# Patient Record
Sex: FEMALE | Race: WHITE | NOT HISPANIC OR LATINO | Employment: PART TIME | URBAN - NONMETROPOLITAN AREA
[De-identification: names, ages, dates, MRNs, and addresses within clinical notes are randomized per-mention and may not be internally consistent; named-entity substitution may affect disease eponyms.]

---

## 2017-01-16 ENCOUNTER — OFFICE VISIT (OUTPATIENT)
Dept: RETAIL CLINIC | Facility: CLINIC | Age: 42
End: 2017-01-16

## 2017-01-16 VITALS
TEMPERATURE: 98.9 F | DIASTOLIC BLOOD PRESSURE: 74 MMHG | BODY MASS INDEX: 35.74 KG/M2 | HEART RATE: 84 BPM | OXYGEN SATURATION: 97 % | RESPIRATION RATE: 18 BRPM | HEIGHT: 66 IN | SYSTOLIC BLOOD PRESSURE: 110 MMHG | WEIGHT: 222.4 LBS

## 2017-01-16 DIAGNOSIS — J01.41 ACUTE RECURRENT PANSINUSITIS: Primary | ICD-10-CM

## 2017-01-16 DIAGNOSIS — H66.002 ACUTE SUPPURATIVE OTITIS MEDIA OF LEFT EAR WITHOUT SPONTANEOUS RUPTURE OF TYMPANIC MEMBRANE, RECURRENCE NOT SPECIFIED: ICD-10-CM

## 2017-01-16 PROCEDURE — 99213 OFFICE O/P EST LOW 20 MIN: CPT | Performed by: NURSE PRACTITIONER

## 2017-01-16 PROCEDURE — 96372 THER/PROPH/DIAG INJ SC/IM: CPT | Performed by: NURSE PRACTITIONER

## 2017-01-16 RX ORDER — DEXAMETHASONE SODIUM PHOSPHATE 4 MG/ML
4 INJECTION, SOLUTION INTRA-ARTICULAR; INTRALESIONAL; INTRAMUSCULAR; INTRAVENOUS; SOFT TISSUE ONCE
Status: COMPLETED | OUTPATIENT
Start: 2017-01-16 | End: 2017-01-16

## 2017-01-16 RX ORDER — CEFDINIR 300 MG/1
300 CAPSULE ORAL 2 TIMES DAILY
Qty: 20 CAPSULE | Refills: 0 | Status: SHIPPED | OUTPATIENT
Start: 2017-01-16 | End: 2017-01-26

## 2017-01-16 RX ADMIN — DEXAMETHASONE SODIUM PHOSPHATE 4 MG: 4 INJECTION, SOLUTION INTRA-ARTICULAR; INTRALESIONAL; INTRAMUSCULAR; INTRAVENOUS; SOFT TISSUE at 16:25

## 2017-01-16 NOTE — MR AVS SNAPSHOT
Aliza Garcia   1/16/2017 3:00 PM   Office Visit    Dept Phone:  [phone]   Encounter #:  [id number]    Provider:  MELISSA VERA   Department:  [facility]                Your Full Care Plan              Today's Medication Changes          These changes are accurate as of: 1/16/17  4:43 PM.  If you have any questions, ask your nurse or doctor.               New Medication(s)Ordered:     cefdinir 300 MG capsule   Commonly known as:  OMNICEF   Take 1 capsule by mouth 2 (Two) Times a Day for 10 days.            Where to Get Your Medications      These medications were sent to [facility] [address] [place] [address] [address] [place] [address] - [phone]  - [phone] [address] [address]     Phone:  [phone]     cefdinir 300 MG capsule                  Your Updated Medication List          This list is accurate as of: 1/16/17  4:43 PM.  Always use your most recent med list.                ACIPHEX PO       cefdinir 300 MG capsule   Commonly known as:  OMNICEF   Take 1 capsule by mouth 2 (Two) Times a Day for 10 days.       ORTHO-NOVUM 1/35 (28) 1-35 MG-MCG per tablet   Generic drug:  norethindrone-ethinyl estradiol       SYNTHROID PO               You Were Diagnosed With        Codes Comments    Acute recurrent pansinusitis    -  Primary ICD-10-CM: J01.41  ICD-9-CM: 461.8     Acute suppurative otitis media of left ear without spontaneous rupture of tympanic membrane, recurrence not specified     ICD-10-CM: H66.002  ICD-9-CM: 382.00       Medications to be Given to You by a Medical Professional     Due       Frequency    (none) dexamethasone (DECADRON) injection 4 mg  Once      Instructions    Sinusitis, Adult  Sinusitis is redness, soreness, and inflammation of the paranasal sinuses. Paranasal sinuses are air pockets within the bones of your face. They are located beneath your eyes, in the middle of your forehead, and above your eyes. In  healthy paranasal sinuses, mucus is able to drain out, and air is able to circulate through them by way of your nose. However, when your paranasal sinuses are inflamed, mucus and air can become trapped. This can allow bacteria and other germs to grow and cause infection.  Sinusitis can develop quickly and last only a short time (acute) or continue over a long period (chronic). Sinusitis that lasts for more than 12 weeks is considered chronic.  CAUSES  Causes of sinusitis include:  · Allergies.  · Structural abnormalities, such as displacement of the cartilage that separates your nostrils (deviated septum), which can decrease the air flow through your nose and sinuses and affect sinus drainage.  · Functional abnormalities, such as when the small hairs (cilia) that line your sinuses and help remove mucus do not work properly or are not present.  SIGNS AND SYMPTOMS  Symptoms of acute and chronic sinusitis are the same. The primary symptoms are pain and pressure around the affected sinuses. Other symptoms include:  · Upper toothache.  · Earache.  · Headache.  · Bad breath.  · Decreased sense of smell and taste.  · A cough, which worsens when you are lying flat.  · Fatigue.  · Fever.  · Thick drainage from your nose, which often is green and may contain pus (purulent).  · Swelling and warmth over the affected sinuses.  DIAGNOSIS  Your health care provider will perform a physical exam. During your exam, your health care provider may perform any of the following to help determine if you have acute sinusitis or chronic sinusitis:  · Look in your nose for signs of abnormal growths in your nostrils (nasal polyps).  · Tap over the affected sinus to check for signs of infection.  · View the inside of your sinuses using an imaging device that has a light attached (endoscope).  If your health care provider suspects that you have chronic sinusitis, one or more of the following tests may be recommended:  · Allergy tests.  · Nasal  culture. A sample of mucus is taken from your nose, sent to a lab, and screened for bacteria.  · Nasal cytology. A sample of mucus is taken from your nose and examined by your health care provider to determine if your sinusitis is related to an allergy.  TREATMENT  Most cases of acute sinusitis are related to a viral infection and will resolve on their own within 10 days. Sometimes, medicines are prescribed to help relieve symptoms of both acute and chronic sinusitis. These may include pain medicines, decongestants, nasal steroid sprays, or saline sprays.  However, for sinusitis related to a bacterial infection, your health care provider will prescribe antibiotic medicines. These are medicines that will help kill the bacteria causing the infection.  Rarely, sinusitis is caused by a fungal infection. In these cases, your health care provider will prescribe antifungal medicine.  For some cases of chronic sinusitis, surgery is needed. Generally, these are cases in which sinusitis recurs more than 3 times per year, despite other treatments.  HOME CARE INSTRUCTIONS  · Drink plenty of water. Water helps thin the mucus so your sinuses can drain more easily.  · Use a humidifier.  · Inhale steam 3-4 times a day (for example, sit in the bathroom with the shower running).  · Apply a warm, moist washcloth to your face 3-4 times a day, or as directed by your health care provider.  · Use saline nasal sprays to help moisten and clean your sinuses.  · Take medicines only as directed by your health care provider.  · If you were prescribed either an antibiotic or antifungal medicine, finish it all even if you start to feel better.  SEEK IMMEDIATE MEDICAL CARE IF:  · You have increasing pain or severe headaches.  · You have nausea, vomiting, or drowsiness.  · You have swelling around your face.  · You have vision problems.  · You have a stiff neck.  · You have difficulty breathing.     This information is not intended to replace  advice given to you by your health care provider. Make sure you discuss any questions you have with your health care provider.     Document Released: 12/18/2006 Document Revised: 01/08/2016 Document Reviewed: 01/01/2013  [employer] Interactive Patient Education ©2016 [employer]      Otitis Media, Adult  Otitis media is redness, soreness, and inflammation of the middle ear. Otitis media may be caused by allergies or, most commonly, by infection. Often it occurs as a complication of the common cold.  SIGNS AND SYMPTOMS  Symptoms of otitis media may include:  · Earache.  · Fever.  · Ringing in your ear.  · Headache.  · Leakage of fluid from the ear.  DIAGNOSIS  To diagnose otitis media, your health care provider will examine your ear with an otoscope. This is an instrument that allows your health care provider to see into your ear in order to examine your eardrum. Your health care provider also will ask you questions about your symptoms.  TREATMENT   Typically, otitis media resolves on its own within 3-5 days. Your health care provider may prescribe medicine to ease your symptoms of pain. If otitis media does not resolve within 5 days or is recurrent, your health care provider may prescribe antibiotic medicines if he or she suspects that a bacterial infection is the cause.  HOME CARE INSTRUCTIONS   · If you were prescribed an antibiotic medicine, finish it all even if you start to feel better.  · Take medicines only as directed by your health care provider.  · Keep all follow-up visits as directed by your health care provider.  SEEK MEDICAL CARE IF:  · You have otitis media only in one ear, or bleeding from your nose, or both.  · You notice a lump on your neck.  · You are not getting better in 3-5 days.  · You feel worse instead of better.  SEEK IMMEDIATE MEDICAL CARE IF:   · You have pain that is not controlled with medicine.  · You have swelling, redness, or pain around your ear or stiffness in your neck.  · You  notice that part of your face is paralyzed.  · You notice that the bone behind your ear (mastoid) is tender when you touch it.  MAKE SURE YOU:   · Understand these instructions.  · Will watch your condition.  · Will get help right away if you are not doing well or get worse.     This information is not intended to replace advice given to you by your health care provider. Make sure you discuss any questions you have with your health care provider.       Dexamethasone injection  What is this medicine?  DEXAMETHASONE (dex a METH a sone) is a corticosteroid. It is used to treat inflammation of the skin, joints, lungs, and other organs. Common conditions treated include asthma, allergies, and arthritis. It is also used for other conditions, like blood disorders and diseases of the adrenal glands.  This medicine may be used for other purposes; ask your health care provider or pharmacist if you have questions.  What should I tell my health care provider before I take this medicine?  They need to know if you have any of these conditions:  -blood clotting problems  -Cushing's syndrome  -diabetes  -glaucoma  -heart problems or disease  -high blood pressure  -infection like herpes, measles, tuberculosis, or chickenpox  -kidney disease  -liver disease  -mental problems  -myasthenia gravis  -osteoporosis  -previous heart attack  -seizures  -stomach, ulcer or intestine disease including colitis and diverticulitis  -thyroid problem  -an unusual or allergic reaction to dexamethasone, corticosteroids, other medicines, lactose, foods, dyes, or preservatives  -pregnant or trying to get pregnant  -breast-feeding  How should I use this medicine?  This medicine is for injection into a muscle, joint, lesion, soft tissue, or vein. It is given by a health care professional in a hospital or clinic setting.  Talk to your pediatrician regarding the use of this medicine in children. Special care may be needed.  Overdosage: If you think you have  taken too much of this medicine contact a poison control center or emergency room at once.  NOTE: This medicine is only for you. Do not share this medicine with others.  What if I miss a dose?  This may not apply. If you are having a series of injections over a prolonged period, try not to miss an appointment. Call your doctor or health care professional to reschedule if you are unable to keep an appointment.  What may interact with this medicine?  Do not take this medicine with any of the following medications:  -mifepristone, RU-486  -vaccines  This medicine may also interact with the following medications:  -amphotericin B  -antibiotics like clarithromycin, erythromycin, and troleandomycin  -aspirin and aspirin-like drugs  -barbiturates like phenobarbital  -carbamazepine  -cholestyramine  -cholinesterase inhibitors like donepezil, galantamine, rivastigmine, and tacrine  -cyclosporine  -digoxin  -diuretics  -ephedrine  -female hormones, like estrogens or progestins and birth control pills  -indinavir  -isoniazid  -ketoconazole  -medicines for diabetes  -medicines that improve muscle tone or strength for conditions like myasthenia gravis  -NSAIDs, medicines for pain and inflammation, like ibuprofen or naproxen  -phenytoin  -rifampin  -thalidomide  -warfarin  This list may not describe all possible interactions. Give your health care provider a list of all the medicines, herbs, non-prescription drugs, or dietary supplements you use. Also tell them if you smoke, drink alcohol, or use illegal drugs. Some items may interact with your medicine.  What should I watch for while using this medicine?  Your condition will be monitored carefully while you are receiving this medicine.  If you are taking this medicine for a long time, carry an identification card with your name and address, the type and dose of your medicine, and your doctor's name and address.  This medicine may increase your risk of getting an infection. Stay  away from people who are sick. Tell your doctor or health care professional if you are around anyone with measles or chickenpox. Talk to your health care provider before you get any vaccines that you take this medicine.  If you are going to have surgery, tell your doctor or health care professional that you have taken this medicine within the last twelve months.  Ask your doctor or health care professional about your diet. You may need to lower the amount of salt you eat.  The medicine can increase your blood sugar. If you are a diabetic check with your doctor if you need help adjusting the dose of your diabetic medicine.  What side effects may I notice from receiving this medicine?  Side effects that you should report to your doctor or health care professional as soon as possible:  -allergic reactions like skin rash, itching or hives, swelling of the face, lips, or tongue  -black or tarry stools  -change in the amount of urine  -changes in vision  -confusion, excitement, restlessness, a false sense of well-being  -fever, sore throat, sneezing, cough, or other signs of infection, wounds that will not heal  -hallucinations  -increased thirst  -mental depression, mood swings, mistaken feelings of self importance or of being mistreated  -pain in hips, back, ribs, arms, shoulders, or legs  -pain, redness, or irritation at the injection site  -redness, blistering, peeling or loosening of the skin, including inside the mouth  -rounding out of face  -swelling of feet or lower legs  -unusual bleeding or bruising  -unusual tired or weak  -wounds that do not heal  Side effects that usually do not require medical attention (report to your doctor or health care professional if they continue or are bothersome):  -diarrhea or constipation  -change in taste  -headache  -nausea, vomiting  -skin problems, acne, thin and shiny skin  -touble sleeping  -unusual growth of hair on the face or body  -weight gain  This list may not  describe all possible side effects. Call your doctor for medical advice about side effects. You may report side effects to FDA at 7-189-FDA-8979.  Where should I keep my medicine?  This drug is given in a hospital or clinic and will not be stored at home.  NOTE: This sheet is a summary. It may not cover all possible information. If you have questions about this medicine, talk to your doctor, pharmacist, or health care provider.     © 2016, [employer]. (2009-04-09 14:04:12)      Document Released: 09/22/2005 Document Revised: 01/08/2016 Document Reviewed: 07/15/2014  [employer] Interactive Patient Education ©2016 [employer]    Cefdinir capsules  What is this medicine?  CEFDINIR (SEF di ner) is a cephalosporin antibiotic. It is used to treat certain kinds of bacterial infections. It will not work for colds, flu, or other viral infections.  This medicine may be used for other purposes; ask your health care provider or pharmacist if you have questions.  What should I tell my health care provider before I take this medicine?  They need to know if you have any of these conditions:  -bleeding problems  -kidney disease  -stomach or intestine problems (especially colitis)  -an unusual or allergic reaction to cefdinir, other cephalosporin antibiotics, penicillin, penicillamine, other foods, dyes or preservatives  -pregnant or trying to get pregnant  -breast-feeding  How should I use this medicine?  Take this medicine by mouth. Swallow it with a drink of water. Follow the directions on the prescription label. You can take it with or without food. If it upsets your stomach it may help to take it with food. Take your doses at regular intervals. Do not take it more often than directed. Finish all the medicine you are prescribed even if you think your infection is better.  Talk to your pediatrician regarding the use of this medicine in children. Special care may be needed.  Overdosage: If you think you have taken too  much of this medicine contact a poison control center or emergency room at once.  NOTE: This medicine is only for you. Do not share this medicine with others.  What if I miss a dose?  If you miss a dose, take it as soon as you can. If it is almost time for your next dose, take only that dose. Do not take double or extra doses.  What may interact with this medicine?  -antacids that contain aluminum or magnesium  -iron supplements  -other antibiotics  -probenecid  This list may not describe all possible interactions. Give your health care provider a list of all the medicines, herbs, non-prescription drugs, or dietary supplements you use. Also tell them if you smoke, drink alcohol, or use illegal drugs. Some items may interact with your medicine.  What should I watch for while using this medicine?  Tell your doctor or health care professional if your symptoms do not get better in a few days.  If you are diabetic you may get a false-positive result for sugar in your urine. Check with your doctor or health care professional before you change your diet or the dose of your diabetes medicine.  What side effects may I notice from receiving this medicine?  Side effects that you should report to your doctor or health care professional as soon as possible:  -allergic reactions like skin rash, itching or hives, swelling of the face, lips, or tongue  -breathing problems  -fever or chills  -redness, blistering, peeling or loosening of the skin, including inside the mouth  -seizures  -severe or watery diarrhea  -sore throat  -swollen joints  -trouble passing urine or change in the amount of urine  -unusual bleeding or bruising  -unusually weak or tired  Side effects that usually do not require medical attention (report to your doctor or health care professional if they continue or are bothersome):  -constipation  -dizziness  -gas or heartburn  -headache  -loss of appetite  -nausea or vomiting  -stomach pain  -stool  discoloration  -vaginal itching  This list may not describe all possible side effects. Call your doctor for medical advice about side effects. You may report side effects to FDA at 7-794-CLB-8861.  Where should I keep my medicine?  Keep out of the reach of children.  Store at room temperature between 15 and 30 degrees C (59 and 86 degrees F). Throw the medicine away after the expiration date.  NOTE: This sheet is a summary. It may not cover all possible information. If you have questions about this medicine, talk to your doctor, pharmacist, or health care provider.     © 2016, [employer]. (2009 16:02:53)      You have been given a steroid injection today. Risks and benefits were discussed prior to the injection.  Continue Flonase (1 spray twice a day for a few days, then daily).  Okay to try Afrin, but for no more than three days. Use a humidifier at the bedside.   You have evidence of a ear infection. Please take antibiotic as prescribed. Continue taking the Probiotic. If symptoms persist or worsen  please see Dr. Panda.          Patient Instructions History      Upcoming Appointments     Visit Type Date Time Department    OFFICE VISIT 2017  3:00 PM [facility]      [facility]     [employer] allows you to send messages to your doctor, view your test results, renew your prescriptions, schedule appointments, and more. To sign up, go to [employer] and click on the Sign Up Now link in the New User? box. Enter your [employer] Activation Code exactly as it appears below along with the last four digits of your Social Security Number and your Date of Birth () to complete the sign-up process. If you do not sign up before the expiration date, you must request a new code.    [employer] Activation Code: [email or web address]  Expires: 2017  4:43 PM    If you have questions, you can email [email or web address] or call [phone] to talk to our [employer]  "staff. Remember, MyChart is NOT to be used for urgent needs. For medical emergencies, dial 911.               Other Info from Your Visit           Allergies     No Known Allergies      Reason for Visit     Sinusitis X 2 days; was really bad yesterday; sinus pressure     Nasal Congestion used nose spray last night and that helped with the congestion a little bit      Vital Signs     Blood Pressure Pulse Temperature Respirations Height Weight    110/74 (BP Location: Right arm, Patient Position: Sitting, Cuff Size: Adult) 84 98.9 °F (37.2 °C) (Oral) 18 66\" (167.6 cm) 222 lb 6.4 oz (101 kg)    Last Menstrual Period Oxygen Saturation Body Mass Index Smoking Status          12/23/2016 (Approximate) 97% 35.9 kg/m2 Never Smoker        Problems and Diagnoses Noted     Sinus infection    -  Primary    Middle ear infection            "

## 2017-01-16 NOTE — PATIENT INSTRUCTIONS
Sinusitis, Adult  Sinusitis is redness, soreness, and inflammation of the paranasal sinuses. Paranasal sinuses are air pockets within the bones of your face. They are located beneath your eyes, in the middle of your forehead, and above your eyes. In healthy paranasal sinuses, mucus is able to drain out, and air is able to circulate through them by way of your nose. However, when your paranasal sinuses are inflamed, mucus and air can become trapped. This can allow bacteria and other germs to grow and cause infection.  Sinusitis can develop quickly and last only a short time (acute) or continue over a long period (chronic). Sinusitis that lasts for more than 12 weeks is considered chronic.  CAUSES  Causes of sinusitis include:  · Allergies.  · Structural abnormalities, such as displacement of the cartilage that separates your nostrils (deviated septum), which can decrease the air flow through your nose and sinuses and affect sinus drainage.  · Functional abnormalities, such as when the small hairs (cilia) that line your sinuses and help remove mucus do not work properly or are not present.  SIGNS AND SYMPTOMS  Symptoms of acute and chronic sinusitis are the same. The primary symptoms are pain and pressure around the affected sinuses. Other symptoms include:  · Upper toothache.  · Earache.  · Headache.  · Bad breath.  · Decreased sense of smell and taste.  · A cough, which worsens when you are lying flat.  · Fatigue.  · Fever.  · Thick drainage from your nose, which often is green and may contain pus (purulent).  · Swelling and warmth over the affected sinuses.  DIAGNOSIS  Your health care provider will perform a physical exam. During your exam, your health care provider may perform any of the following to help determine if you have acute sinusitis or chronic sinusitis:  · Look in your nose for signs of abnormal growths in your nostrils (nasal polyps).  · Tap over the affected sinus to check for signs of  infection.  · View the inside of your sinuses using an imaging device that has a light attached (endoscope).  If your health care provider suspects that you have chronic sinusitis, one or more of the following tests may be recommended:  · Allergy tests.  · Nasal culture. A sample of mucus is taken from your nose, sent to a lab, and screened for bacteria.  · Nasal cytology. A sample of mucus is taken from your nose and examined by your health care provider to determine if your sinusitis is related to an allergy.  TREATMENT  Most cases of acute sinusitis are related to a viral infection and will resolve on their own within 10 days. Sometimes, medicines are prescribed to help relieve symptoms of both acute and chronic sinusitis. These may include pain medicines, decongestants, nasal steroid sprays, or saline sprays.  However, for sinusitis related to a bacterial infection, your health care provider will prescribe antibiotic medicines. These are medicines that will help kill the bacteria causing the infection.  Rarely, sinusitis is caused by a fungal infection. In these cases, your health care provider will prescribe antifungal medicine.  For some cases of chronic sinusitis, surgery is needed. Generally, these are cases in which sinusitis recurs more than 3 times per year, despite other treatments.  HOME CARE INSTRUCTIONS  · Drink plenty of water. Water helps thin the mucus so your sinuses can drain more easily.  · Use a humidifier.  · Inhale steam 3-4 times a day (for example, sit in the bathroom with the shower running).  · Apply a warm, moist washcloth to your face 3-4 times a day, or as directed by your health care provider.  · Use saline nasal sprays to help moisten and clean your sinuses.  · Take medicines only as directed by your health care provider.  · If you were prescribed either an antibiotic or antifungal medicine, finish it all even if you start to feel better.  SEEK IMMEDIATE MEDICAL CARE IF:  · You have  increasing pain or severe headaches.  · You have nausea, vomiting, or drowsiness.  · You have swelling around your face.  · You have vision problems.  · You have a stiff neck.  · You have difficulty breathing.     This information is not intended to replace advice given to you by your health care provider. Make sure you discuss any questions you have with your health care provider.     Document Released: 12/18/2006 Document Revised: 01/08/2016 Document Reviewed: 01/01/2013  [employer] Interactive Patient Education ©2016 [employer]      Otitis Media, Adult  Otitis media is redness, soreness, and inflammation of the middle ear. Otitis media may be caused by allergies or, most commonly, by infection. Often it occurs as a complication of the common cold.  SIGNS AND SYMPTOMS  Symptoms of otitis media may include:  · Earache.  · Fever.  · Ringing in your ear.  · Headache.  · Leakage of fluid from the ear.  DIAGNOSIS  To diagnose otitis media, your health care provider will examine your ear with an otoscope. This is an instrument that allows your health care provider to see into your ear in order to examine your eardrum. Your health care provider also will ask you questions about your symptoms.  TREATMENT   Typically, otitis media resolves on its own within 3-5 days. Your health care provider may prescribe medicine to ease your symptoms of pain. If otitis media does not resolve within 5 days or is recurrent, your health care provider may prescribe antibiotic medicines if he or she suspects that a bacterial infection is the cause.  HOME CARE INSTRUCTIONS   · If you were prescribed an antibiotic medicine, finish it all even if you start to feel better.  · Take medicines only as directed by your health care provider.  · Keep all follow-up visits as directed by your health care provider.  SEEK MEDICAL CARE IF:  · You have otitis media only in one ear, or bleeding from your nose, or both.  · You notice a lump on your  neck.  · You are not getting better in 3-5 days.  · You feel worse instead of better.  SEEK IMMEDIATE MEDICAL CARE IF:   · You have pain that is not controlled with medicine.  · You have swelling, redness, or pain around your ear or stiffness in your neck.  · You notice that part of your face is paralyzed.  · You notice that the bone behind your ear (mastoid) is tender when you touch it.  MAKE SURE YOU:   · Understand these instructions.  · Will watch your condition.  · Will get help right away if you are not doing well or get worse.     This information is not intended to replace advice given to you by your health care provider. Make sure you discuss any questions you have with your health care provider.       Dexamethasone injection  What is this medicine?  DEXAMETHASONE (dex a METH a sone) is a corticosteroid. It is used to treat inflammation of the skin, joints, lungs, and other organs. Common conditions treated include asthma, allergies, and arthritis. It is also used for other conditions, like blood disorders and diseases of the adrenal glands.  This medicine may be used for other purposes; ask your health care provider or pharmacist if you have questions.  What should I tell my health care provider before I take this medicine?  They need to know if you have any of these conditions:  -blood clotting problems  -Cushing's syndrome  -diabetes  -glaucoma  -heart problems or disease  -high blood pressure  -infection like herpes, measles, tuberculosis, or chickenpox  -kidney disease  -liver disease  -mental problems  -myasthenia gravis  -osteoporosis  -previous heart attack  -seizures  -stomach, ulcer or intestine disease including colitis and diverticulitis  -thyroid problem  -an unusual or allergic reaction to dexamethasone, corticosteroids, other medicines, lactose, foods, dyes, or preservatives  -pregnant or trying to get pregnant  -breast-feeding  How should I use this medicine?  This medicine is for injection  into a muscle, joint, lesion, soft tissue, or vein. It is given by a health care professional in a hospital or clinic setting.  Talk to your pediatrician regarding the use of this medicine in children. Special care may be needed.  Overdosage: If you think you have taken too much of this medicine contact a poison control center or emergency room at once.  NOTE: This medicine is only for you. Do not share this medicine with others.  What if I miss a dose?  This may not apply. If you are having a series of injections over a prolonged period, try not to miss an appointment. Call your doctor or health care professional to reschedule if you are unable to keep an appointment.  What may interact with this medicine?  Do not take this medicine with any of the following medications:  -mifepristone, RU-486  -vaccines  This medicine may also interact with the following medications:  -amphotericin B  -antibiotics like clarithromycin, erythromycin, and troleandomycin  -aspirin and aspirin-like drugs  -barbiturates like phenobarbital  -carbamazepine  -cholestyramine  -cholinesterase inhibitors like donepezil, galantamine, rivastigmine, and tacrine  -cyclosporine  -digoxin  -diuretics  -ephedrine  -female hormones, like estrogens or progestins and birth control pills  -indinavir  -isoniazid  -ketoconazole  -medicines for diabetes  -medicines that improve muscle tone or strength for conditions like myasthenia gravis  -NSAIDs, medicines for pain and inflammation, like ibuprofen or naproxen  -phenytoin  -rifampin  -thalidomide  -warfarin  This list may not describe all possible interactions. Give your health care provider a list of all the medicines, herbs, non-prescription drugs, or dietary supplements you use. Also tell them if you smoke, drink alcohol, or use illegal drugs. Some items may interact with your medicine.  What should I watch for while using this medicine?  Your condition will be monitored carefully while you are  receiving this medicine.  If you are taking this medicine for a long time, carry an identification card with your name and address, the type and dose of your medicine, and your doctor's name and address.  This medicine may increase your risk of getting an infection. Stay away from people who are sick. Tell your doctor or health care professional if you are around anyone with measles or chickenpox. Talk to your health care provider before you get any vaccines that you take this medicine.  If you are going to have surgery, tell your doctor or health care professional that you have taken this medicine within the last twelve months.  Ask your doctor or health care professional about your diet. You may need to lower the amount of salt you eat.  The medicine can increase your blood sugar. If you are a diabetic check with your doctor if you need help adjusting the dose of your diabetic medicine.  What side effects may I notice from receiving this medicine?  Side effects that you should report to your doctor or health care professional as soon as possible:  -allergic reactions like skin rash, itching or hives, swelling of the face, lips, or tongue  -black or tarry stools  -change in the amount of urine  -changes in vision  -confusion, excitement, restlessness, a false sense of well-being  -fever, sore throat, sneezing, cough, or other signs of infection, wounds that will not heal  -hallucinations  -increased thirst  -mental depression, mood swings, mistaken feelings of self importance or of being mistreated  -pain in hips, back, ribs, arms, shoulders, or legs  -pain, redness, or irritation at the injection site  -redness, blistering, peeling or loosening of the skin, including inside the mouth  -rounding out of face  -swelling of feet or lower legs  -unusual bleeding or bruising  -unusual tired or weak  -wounds that do not heal  Side effects that usually do not require medical attention (report to your doctor or health care  professional if they continue or are bothersome):  -diarrhea or constipation  -change in taste  -headache  -nausea, vomiting  -skin problems, acne, thin and shiny skin  -touble sleeping  -unusual growth of hair on the face or body  -weight gain  This list may not describe all possible side effects. Call your doctor for medical advice about side effects. You may report side effects to FDA at 5-637-VYY-7453.  Where should I keep my medicine?  This drug is given in a hospital or clinic and will not be stored at home.  NOTE: This sheet is a summary. It may not cover all possible information. If you have questions about this medicine, talk to your doctor, pharmacist, or health care provider.     © 2016, [employer]. (2009-04-09 14:04:12)      Document Released: 09/22/2005 Document Revised: 01/08/2016 Document Reviewed: 07/15/2014  [employer] Interactive Patient Education ©2016 [employer]    Cefdinir capsules  What is this medicine?  CEFDINIR (SEF di ner) is a cephalosporin antibiotic. It is used to treat certain kinds of bacterial infections. It will not work for colds, flu, or other viral infections.  This medicine may be used for other purposes; ask your health care provider or pharmacist if you have questions.  What should I tell my health care provider before I take this medicine?  They need to know if you have any of these conditions:  -bleeding problems  -kidney disease  -stomach or intestine problems (especially colitis)  -an unusual or allergic reaction to cefdinir, other cephalosporin antibiotics, penicillin, penicillamine, other foods, dyes or preservatives  -pregnant or trying to get pregnant  -breast-feeding  How should I use this medicine?  Take this medicine by mouth. Swallow it with a drink of water. Follow the directions on the prescription label. You can take it with or without food. If it upsets your stomach it may help to take it with food. Take your doses at regular intervals. Do not take  it more often than directed. Finish all the medicine you are prescribed even if you think your infection is better.  Talk to your pediatrician regarding the use of this medicine in children. Special care may be needed.  Overdosage: If you think you have taken too much of this medicine contact a poison control center or emergency room at once.  NOTE: This medicine is only for you. Do not share this medicine with others.  What if I miss a dose?  If you miss a dose, take it as soon as you can. If it is almost time for your next dose, take only that dose. Do not take double or extra doses.  What may interact with this medicine?  -antacids that contain aluminum or magnesium  -iron supplements  -other antibiotics  -probenecid  This list may not describe all possible interactions. Give your health care provider a list of all the medicines, herbs, non-prescription drugs, or dietary supplements you use. Also tell them if you smoke, drink alcohol, or use illegal drugs. Some items may interact with your medicine.  What should I watch for while using this medicine?  Tell your doctor or health care professional if your symptoms do not get better in a few days.  If you are diabetic you may get a false-positive result for sugar in your urine. Check with your doctor or health care professional before you change your diet or the dose of your diabetes medicine.  What side effects may I notice from receiving this medicine?  Side effects that you should report to your doctor or health care professional as soon as possible:  -allergic reactions like skin rash, itching or hives, swelling of the face, lips, or tongue  -breathing problems  -fever or chills  -redness, blistering, peeling or loosening of the skin, including inside the mouth  -seizures  -severe or watery diarrhea  -sore throat  -swollen joints  -trouble passing urine or change in the amount of urine  -unusual bleeding or bruising  -unusually weak or tired  Side effects that  usually do not require medical attention (report to your doctor or health care professional if they continue or are bothersome):  -constipation  -dizziness  -gas or heartburn  -headache  -loss of appetite  -nausea or vomiting  -stomach pain  -stool discoloration  -vaginal itching  This list may not describe all possible side effects. Call your doctor for medical advice about side effects. You may report side effects to FDA at 0-786-TKV-3510.  Where should I keep my medicine?  Keep out of the reach of children.  Store at room temperature between 15 and 30 degrees C (59 and 86 degrees F). Throw the medicine away after the expiration date.  NOTE: This sheet is a summary. It may not cover all possible information. If you have questions about this medicine, talk to your doctor, pharmacist, or health care provider.     © 2016, [employer]. (2009-02-27 16:02:53)      You have been given a steroid injection today. Risks and benefits were discussed prior to the injection.  Continue Flonase (1 spray twice a day for a few days, then daily).  Okay to try Afrin, but for no more than three days. Use a humidifier at the bedside.   You have evidence of a ear infection. Please take antibiotic as prescribed. Continue taking the Probiotic. If symptoms persist or worsen  please see Dr. Panda.

## 2017-01-16 NOTE — PROGRESS NOTES
Chief Complaint   Patient presents with   • Sinusitis     X 2 days; was really bad yesterday; sinus pressure    • Nasal Congestion     used nose spray last night and that helped with the congestion a little bit     Subjective   Aliza Garcia is a 42 y.o. female who presents to the clinic today with complaints of sinus pressure and congestion. She was treated with Augmentin from December 30-January 8 due to a sinus infection. She reports after three days she felt much better and continued to feel well until two days ago. She has had increased sinus pain and pressure. She also had ear pressure. She used a dose of Afrin last night, but it didn't help much. She also tried using a nasal rinse, but was too congested for it to flow. She has taken Benadryl. She bought some Pseudoephedrine today, but hasn't taken it.    The following portions of the patient's history were reviewed and updated as appropriate: allergies, past family history, past medical history, past social history, past surgical history and problem list.    Current Outpatient Prescriptions:   •  Levothyroxine Sodium (SYNTHROID PO), Take  by mouth Daily., Disp: , Rfl:   •  norethindrone-ethinyl estradiol (ORTHO-NOVUM 1/35, 28,) 1-35 MG-MCG per tablet, Take 1 tablet by mouth Daily., Disp: , Rfl:   •  RABEprazole Sodium (ACIPHEX PO), Take  by mouth Every Night., Disp: , Rfl:     Allergies:  Review of patient's allergies indicates no known allergies.  Past Medical History   Diagnosis Date   • Acid reflux    • Disease of thyroid gland      Past Surgical History   Procedure Laterality Date   • Cholecystectomy       Family History   Problem Relation Age of Onset   • Diabetes Mother    • Hypothyroidism Mother    • Heart disease Father    • Hypertension Father    • Cancer Maternal Grandfather    • Cancer Paternal Grandfather      Social History   Substance Use Topics   • Smoking status: Never Smoker   • Smokeless tobacco: Never Used   • Alcohol use None  "      Review of Systems  Review of Systems   Constitutional: Positive for fever (maybe, not sure). Negative for chills.       Objective   Visit Vitals   • /74 (BP Location: Right arm, Patient Position: Sitting, Cuff Size: Adult)   • Pulse 84   • Temp 98.9 °F (37.2 °C) (Oral)   • Resp 18   • Ht 66\" (167.6 cm)   • Wt 222 lb 6.4 oz (101 kg)   • LMP 12/23/2016 (Approximate)   • SpO2 97%   • BMI 35.9 kg/m2     Last 2 weights    01/16/17  1543   Weight: 222 lb 6.4 oz (101 kg)       Physical Exam   Constitutional: She is oriented to person, place, and time. Vital signs are normal. She appears well-developed and well-nourished. She is cooperative. She appears ill (mildly). No distress.   HENT:   Head: Normocephalic and atraumatic.   Right Ear: Tympanic membrane, external ear and ear canal normal. No mastoid tenderness. Tympanic membrane is not erythematous.   Left Ear: External ear and ear canal normal. No mastoid tenderness. Tympanic membrane is erythematous. A middle ear effusion (cloudy) is present.   Nose: Mucosal edema present. Right sinus exhibits maxillary sinus tenderness and frontal sinus tenderness. Left sinus exhibits maxillary sinus tenderness and frontal sinus tenderness.   Mouth/Throat: Uvula is midline, oropharynx is clear and moist and mucous membranes are normal. Tonsils are 1+ on the right. Tonsils are 1+ on the left. No tonsillar exudate.   Eyes: Conjunctivae, EOM and lids are normal. Pupils are equal, round, and reactive to light.   Neck: Trachea normal and normal range of motion. Neck supple.   Cardiovascular: Normal rate, regular rhythm, S1 normal, S2 normal and normal heart sounds.    Pulmonary/Chest: Effort normal. She has no decreased breath sounds. She has no rhonchi. She has no rales.   Lymphadenopathy:     She has no cervical adenopathy.   Neurological: She is alert and oriented to person, place, and time. Coordination and gait normal.   Skin: Skin is warm and dry.   Psychiatric: She has a " normal mood and affect. Her speech is normal and behavior is normal.       Assessment/Plan     Aliza was seen today for sinusitis and nasal congestion.    Diagnoses and all orders for this visit:    Acute recurrent pansinusitis  -     dexamethasone (DECADRON) injection 4 mg; Inject 1 mL into the shoulder, thigh, or buttocks 1 (One) Time.    Acute suppurative otitis media of left ear without spontaneous rupture of tympanic membrane, recurrence not specified    Other orders  -     cefdinir (OMNICEF) 300 MG capsule; Take 1 capsule by mouth 2 (Two) Times a Day for 10 days.    You have been given a steroid injection today. Risks and benefits were discussed prior to the injection.  Continue Flonase (1 spray twice a day for a few days, then daily).  Okay to try Afrin, but for no more than three days. Use a humidifier at the bedside.   You have evidence of a ear infection. Please take antibiotic as prescribed. Continue taking the Probiotic. If symptoms persist or worsen  please see Dr. Panda.

## 2017-03-01 ENCOUNTER — TRANSCRIBE ORDERS (OUTPATIENT)
Dept: ADMINISTRATIVE | Facility: HOSPITAL | Age: 42
End: 2017-03-01

## 2017-03-01 ENCOUNTER — HOSPITAL ENCOUNTER (OUTPATIENT)
Dept: CARDIOLOGY | Facility: HOSPITAL | Age: 42
Discharge: HOME | End: 2017-03-01
Admitting: PHYSICIAN ASSISTANT

## 2017-03-01 DIAGNOSIS — R79.82 ELEVATED C-REACTIVE PROTEIN (CRP): Primary | ICD-10-CM

## 2017-03-01 DIAGNOSIS — R79.82 ELEVATED C-REACTIVE PROTEIN (CRP): ICD-10-CM

## 2017-03-01 PROCEDURE — 93005 ELECTROCARDIOGRAM TRACING: CPT

## 2017-03-01 PROCEDURE — 93010 ELECTROCARDIOGRAM REPORT: CPT | Performed by: INTERNAL MEDICINE

## 2017-03-03 ENCOUNTER — TRANSCRIBE ORDERS (OUTPATIENT)
Dept: ADMINISTRATIVE | Facility: HOSPITAL | Age: 42
End: 2017-03-03

## 2017-03-03 DIAGNOSIS — R79.82 ELEVATED C-REACTIVE PROTEIN (CRP): ICD-10-CM

## 2017-03-03 DIAGNOSIS — R01.1 CARDIAC MURMUR: Primary | ICD-10-CM

## 2017-03-07 ENCOUNTER — HOSPITAL ENCOUNTER (OUTPATIENT)
Dept: CARDIOLOGY | Facility: HOSPITAL | Age: 42
Discharge: HOME | End: 2017-03-07
Attending: FAMILY MEDICINE | Admitting: FAMILY MEDICINE

## 2017-03-07 VITALS
WEIGHT: 207 LBS | DIASTOLIC BLOOD PRESSURE: 70 MMHG | HEIGHT: 66 IN | SYSTOLIC BLOOD PRESSURE: 120 MMHG | BODY MASS INDEX: 33.27 KG/M2

## 2017-03-07 DIAGNOSIS — R79.82 ELEVATED C-REACTIVE PROTEIN (CRP): ICD-10-CM

## 2017-03-07 DIAGNOSIS — R01.1 CARDIAC MURMUR: ICD-10-CM

## 2017-03-07 LAB
BH CV ECHO MEAS - AO MAX PG (FULL): 3.7 MMHG
BH CV ECHO MEAS - AO MAX PG: 12.8 MMHG
BH CV ECHO MEAS - AO MEAN PG (FULL): 1 MMHG
BH CV ECHO MEAS - AO MEAN PG: 5 MMHG
BH CV ECHO MEAS - AO ROOT AREA (BSA CORRECTED): 1.3
BH CV ECHO MEAS - AO ROOT AREA: 5.7 CM^2
BH CV ECHO MEAS - AO ROOT DIAM: 2.7 CM
BH CV ECHO MEAS - AO V2 MAX: 179 CM/SEC
BH CV ECHO MEAS - AO V2 MEAN: 99.3 CM/SEC
BH CV ECHO MEAS - AO V2 VTI: 42.1 CM
BH CV ECHO MEAS - AVA(I,A): 2.4 CM^2
BH CV ECHO MEAS - AVA(I,D): 2.4 CM^2
BH CV ECHO MEAS - AVA(V,A): 2.1 CM^2
BH CV ECHO MEAS - AVA(V,D): 2.1 CM^2
BH CV ECHO MEAS - BSA(HAYCOCK): 2.1 M^2
BH CV ECHO MEAS - BSA: 2 M^2
BH CV ECHO MEAS - BZI_BMI: 32.6 KILOGRAMS/M^2
BH CV ECHO MEAS - BZI_METRIC_HEIGHT: 167.6 CM
BH CV ECHO MEAS - BZI_METRIC_WEIGHT: 91.6 KG
BH CV ECHO MEAS - CONTRAST EF 4CH: 68.6 ML/M^2
BH CV ECHO MEAS - EDV(CUBED): 64 ML
BH CV ECHO MEAS - EDV(MOD-SP4): 96 ML
BH CV ECHO MEAS - EDV(TEICH): 70 ML
BH CV ECHO MEAS - EF(CUBED): 75.6 %
BH CV ECHO MEAS - EF(MOD-SP4): 68.6 %
BH CV ECHO MEAS - EF(TEICH): 68.1 %
BH CV ECHO MEAS - ESV(CUBED): 15.6 ML
BH CV ECHO MEAS - ESV(MOD-SP4): 30.1 ML
BH CV ECHO MEAS - ESV(TEICH): 22.3 ML
BH CV ECHO MEAS - FS: 37.5 %
BH CV ECHO MEAS - IVS/LVPW: 1.1
BH CV ECHO MEAS - IVSD: 1.1 CM
BH CV ECHO MEAS - LA DIMENSION: 3.4 CM
BH CV ECHO MEAS - LA/AO: 1.3
BH CV ECHO MEAS - LAT PEAK E' VEL: 8.5 CM/SEC
BH CV ECHO MEAS - LV DIASTOLIC VOL/BSA (35-75): 47.8 ML/M^2
BH CV ECHO MEAS - LV MASS(C)D: 136.2 GRAMS
BH CV ECHO MEAS - LV MASS(C)DI: 67.8 GRAMS/M^2
BH CV ECHO MEAS - LV MAX PG: 9.1 MMHG
BH CV ECHO MEAS - LV MEAN PG: 4 MMHG
BH CV ECHO MEAS - LV SYSTOLIC VOL/BSA (12-30): 15 ML/M^2
BH CV ECHO MEAS - LV V1 MAX: 151 CM/SEC
BH CV ECHO MEAS - LV V1 MEAN: 90.4 CM/SEC
BH CV ECHO MEAS - LV V1 VTI: 39.2 CM
BH CV ECHO MEAS - LVIDD: 4 CM
BH CV ECHO MEAS - LVIDS: 2.5 CM
BH CV ECHO MEAS - LVLD AP4: 8.3 CM
BH CV ECHO MEAS - LVLS AP4: 6.7 CM
BH CV ECHO MEAS - LVOT AREA (M): 2.5 CM^2
BH CV ECHO MEAS - LVOT AREA: 2.5 CM^2
BH CV ECHO MEAS - LVOT DIAM: 1.8 CM
BH CV ECHO MEAS - LVPWD: 1 CM
BH CV ECHO MEAS - MED PEAK E' VEL: 9.46 CM/SEC
BH CV ECHO MEAS - MV A MAX VEL: 76.6 CM/SEC
BH CV ECHO MEAS - MV DEC TIME: 0.25 SEC
BH CV ECHO MEAS - MV E MAX VEL: 115 CM/SEC
BH CV ECHO MEAS - MV E/A: 1.5
BH CV ECHO MEAS - RAP SYSTOLE: 5 MMHG
BH CV ECHO MEAS - RVSP: 29.6 MMHG
BH CV ECHO MEAS - SI(AO): 120 ML/M^2
BH CV ECHO MEAS - SI(CUBED): 24.1 ML/M^2
BH CV ECHO MEAS - SI(LVOT): 49.7 ML/M^2
BH CV ECHO MEAS - SI(MOD-SP4): 32.8 ML/M^2
BH CV ECHO MEAS - SI(TEICH): 23.7 ML/M^2
BH CV ECHO MEAS - SV(AO): 241 ML
BH CV ECHO MEAS - SV(CUBED): 48.4 ML
BH CV ECHO MEAS - SV(LVOT): 99.8 ML
BH CV ECHO MEAS - SV(MOD-SP4): 65.9 ML
BH CV ECHO MEAS - SV(TEICH): 47.7 ML
BH CV ECHO MEAS - TR MAX VEL: 248 CM/SEC
E/E' RATIO: 13.5
LEFT ATRIUM VOLUME INDEX: 21.4 ML/M2
LEFT ATRIUM VOLUME: 43.1 CM3

## 2017-03-07 PROCEDURE — 93306 TTE W/DOPPLER COMPLETE: CPT | Performed by: INTERNAL MEDICINE

## 2017-03-07 PROCEDURE — 93306 TTE W/DOPPLER COMPLETE: CPT

## 2017-03-08 ENCOUNTER — TRANSCRIBE ORDERS (OUTPATIENT)
Dept: ADMINISTRATIVE | Facility: HOSPITAL | Age: 42
End: 2017-03-08

## 2017-03-08 DIAGNOSIS — R79.89 ABNORMAL LIVER FUNCTION TEST: Primary | ICD-10-CM

## 2017-03-10 ENCOUNTER — HOSPITAL ENCOUNTER (OUTPATIENT)
Dept: ULTRASOUND IMAGING | Facility: HOSPITAL | Age: 42
Discharge: HOME | End: 2017-03-10
Attending: FAMILY MEDICINE | Admitting: FAMILY MEDICINE

## 2017-03-10 ENCOUNTER — TRANSCRIBE ORDERS (OUTPATIENT)
Dept: ADMINISTRATIVE | Facility: HOSPITAL | Age: 42
End: 2017-03-10

## 2017-03-10 ENCOUNTER — HOSPITAL ENCOUNTER (OUTPATIENT)
Dept: CARDIOLOGY | Facility: HOSPITAL | Age: 42
Discharge: HOME | End: 2017-03-10
Attending: FAMILY MEDICINE

## 2017-03-10 VITALS — SYSTOLIC BLOOD PRESSURE: 139 MMHG | DIASTOLIC BLOOD PRESSURE: 84 MMHG | HEART RATE: 72 BPM

## 2017-03-10 DIAGNOSIS — R94.31 ABNORMAL EKG: ICD-10-CM

## 2017-03-10 DIAGNOSIS — R94.31 ABNORMAL EKG: Primary | ICD-10-CM

## 2017-03-10 DIAGNOSIS — R79.89 ABNORMAL LIVER FUNCTION TEST: ICD-10-CM

## 2017-03-10 PROCEDURE — 76705 ECHO EXAM OF ABDOMEN: CPT

## 2017-03-10 PROCEDURE — 93018 CV STRESS TEST I&R ONLY: CPT | Performed by: INTERNAL MEDICINE

## 2017-03-10 PROCEDURE — 93017 CV STRESS TEST TRACING ONLY: CPT

## 2017-03-10 PROCEDURE — 25010000002 PERFLUTREN (DEFINITY) 8.476 MG IN SODIUM CHLORIDE 10 ML INJECTION: Performed by: INTERNAL MEDICINE

## 2017-03-10 PROCEDURE — 93352 ADMIN ECG CONTRAST AGENT: CPT | Performed by: INTERNAL MEDICINE

## 2017-03-10 PROCEDURE — C8928 TTE W OR W/O FOL W/CON,STRES: HCPCS

## 2017-03-10 PROCEDURE — 93350 STRESS TTE ONLY: CPT | Performed by: INTERNAL MEDICINE

## 2017-03-10 RX ADMIN — SODIUM CHLORIDE 5 ML: 9 INJECTION INTRAMUSCULAR; INTRAVENOUS; SUBCUTANEOUS at 12:00

## 2017-03-10 RX ADMIN — SODIUM CHLORIDE 5 ML: 9 INJECTION INTRAMUSCULAR; INTRAVENOUS; SUBCUTANEOUS at 12:17

## 2017-03-11 LAB
BH CV STRESS BP STAGE 1: NORMAL
BH CV STRESS BP STAGE 2: NORMAL
BH CV STRESS BP STAGE 3: NORMAL
BH CV STRESS DURATION MIN STAGE 1: 3
BH CV STRESS DURATION MIN STAGE 2: 3
BH CV STRESS DURATION MIN STAGE 3: 1
BH CV STRESS DURATION SEC STAGE 1: 0
BH CV STRESS DURATION SEC STAGE 2: 0
BH CV STRESS DURATION SEC STAGE 3: 30
BH CV STRESS GRADE STAGE 1: 10
BH CV STRESS GRADE STAGE 2: 12
BH CV STRESS GRADE STAGE 3: 14
BH CV STRESS HR STAGE 1: 116
BH CV STRESS HR STAGE 2: 137
BH CV STRESS HR STAGE 3: 153
BH CV STRESS METS STAGE 1: 5
BH CV STRESS METS STAGE 2: 7.5
BH CV STRESS METS STAGE 3: 10
BH CV STRESS PROTOCOL 1: NORMAL
BH CV STRESS RECOVERY BP: NORMAL MMHG
BH CV STRESS RECOVERY HR: 90 BPM
BH CV STRESS SPEED STAGE 1: 1.7
BH CV STRESS SPEED STAGE 2: 2.5
BH CV STRESS SPEED STAGE 3: 3.4
BH CV STRESS STAGE 1: 1
BH CV STRESS STAGE 2: 2
BH CV STRESS STAGE 3: 3
MAXIMAL PREDICTED HEART RATE: 176 BPM
PERCENT MAX PREDICTED HR: 86.93 %
STRESS BASELINE BP: NORMAL MMHG
STRESS BASELINE HR: 86 BPM
STRESS PERCENT HR: 102 %
STRESS POST ESTIMATED WORKLOAD: 10 METS
STRESS POST EXERCISE DUR MIN: 7 MIN
STRESS POST EXERCISE DUR SEC: 30 SEC
STRESS POST PEAK BP: NORMAL MMHG
STRESS POST PEAK HR: 153 BPM
STRESS TARGET HR: 150 BPM

## 2017-09-05 ENCOUNTER — TRANSCRIBE ORDERS (OUTPATIENT)
Dept: ADMINISTRATIVE | Facility: HOSPITAL | Age: 42
End: 2017-09-05

## 2017-09-05 DIAGNOSIS — Z12.31 ENCOUNTER FOR SCREENING MAMMOGRAM FOR MALIGNANT NEOPLASM OF BREAST: Primary | ICD-10-CM

## 2017-10-12 ENCOUNTER — APPOINTMENT (OUTPATIENT)
Dept: MAMMOGRAPHY | Facility: HOSPITAL | Age: 42
End: 2017-10-12
Attending: FAMILY MEDICINE

## 2017-10-17 ENCOUNTER — HOSPITAL ENCOUNTER (OUTPATIENT)
Dept: MAMMOGRAPHY | Facility: HOSPITAL | Age: 42
Discharge: HOME | End: 2017-10-17
Attending: FAMILY MEDICINE | Admitting: FAMILY MEDICINE

## 2017-10-17 DIAGNOSIS — Z12.31 ENCOUNTER FOR SCREENING MAMMOGRAM FOR MALIGNANT NEOPLASM OF BREAST: ICD-10-CM

## 2017-10-17 PROCEDURE — G0202 SCR MAMMO BI INCL CAD: HCPCS

## 2017-10-17 PROCEDURE — 77063 BREAST TOMOSYNTHESIS BI: CPT

## 2018-05-13 ENCOUNTER — HOSPITAL ENCOUNTER (EMERGENCY)
Facility: HOSPITAL | Age: 43
Discharge: HOME | End: 2018-05-14
Attending: EMERGENCY MEDICINE | Admitting: SPECIALIST

## 2018-05-13 ENCOUNTER — APPOINTMENT (OUTPATIENT)
Dept: CT IMAGING | Facility: HOSPITAL | Age: 43
End: 2018-05-13

## 2018-05-13 DIAGNOSIS — K35.80 ACUTE APPENDICITIS, UNSPECIFIED ACUTE APPENDICITIS TYPE: Primary | ICD-10-CM

## 2018-05-13 LAB
BASOPHILS # BLD AUTO: 0.07 10*3/MM3 (ref 0–0.2)
BASOPHILS NFR BLD AUTO: 0.5 % (ref 0–2)
DEPRECATED RDW RBC AUTO: 40.1 FL (ref 40–54)
EOSINOPHIL # BLD AUTO: 0.22 10*3/MM3 (ref 0–0.7)
EOSINOPHIL NFR BLD AUTO: 1.7 % (ref 0–4)
ERYTHROCYTE [DISTWIDTH] IN BLOOD BY AUTOMATED COUNT: 13 % (ref 12–15)
HCT VFR BLD AUTO: 41.6 % (ref 37–47)
HGB BLD-MCNC: 14.3 G/DL (ref 12–16)
IMM GRANULOCYTES # BLD: 0.07 10*3/MM3 (ref 0–0.03)
IMM GRANULOCYTES NFR BLD: 0.5 % (ref 0–5)
LYMPHOCYTES # BLD AUTO: 2.59 10*3/MM3 (ref 0.72–4.86)
LYMPHOCYTES NFR BLD AUTO: 19.5 % (ref 15–45)
MCH RBC QN AUTO: 29.1 PG (ref 28–32)
MCHC RBC AUTO-ENTMCNC: 34.4 G/DL (ref 33–36)
MCV RBC AUTO: 84.7 FL (ref 82–98)
MONOCYTES # BLD AUTO: 0.92 10*3/MM3 (ref 0.19–1.3)
MONOCYTES NFR BLD AUTO: 6.9 % (ref 4–12)
NEUTROPHILS # BLD AUTO: 9.38 10*3/MM3 (ref 1.87–8.4)
NEUTROPHILS NFR BLD AUTO: 70.9 % (ref 39–78)
NRBC BLD MANUAL-RTO: 0 /100 WBC (ref 0–0)
PLATELET # BLD AUTO: 314 10*3/MM3 (ref 130–400)
PMV BLD AUTO: 10.1 FL (ref 6–12)
RBC # BLD AUTO: 4.91 10*6/MM3 (ref 4.2–5.4)
WBC NRBC COR # BLD: 13.25 10*3/MM3 (ref 4.8–10.8)

## 2018-05-13 PROCEDURE — 74177 CT ABD & PELVIS W/CONTRAST: CPT

## 2018-05-13 PROCEDURE — 96375 TX/PRO/DX INJ NEW DRUG ADDON: CPT

## 2018-05-13 PROCEDURE — 99284 EMERGENCY DEPT VISIT MOD MDM: CPT

## 2018-05-13 PROCEDURE — 85025 COMPLETE CBC W/AUTO DIFF WBC: CPT | Performed by: EMERGENCY MEDICINE

## 2018-05-13 PROCEDURE — 96374 THER/PROPH/DIAG INJ IV PUSH: CPT

## 2018-05-13 PROCEDURE — P9612 CATHETERIZE FOR URINE SPEC: HCPCS

## 2018-05-13 PROCEDURE — 25010000002 ONDANSETRON PER 1 MG: Performed by: EMERGENCY MEDICINE

## 2018-05-13 PROCEDURE — 80053 COMPREHEN METABOLIC PANEL: CPT | Performed by: EMERGENCY MEDICINE

## 2018-05-13 PROCEDURE — 25010000002 MORPHINE SULFATE (PF) 2 MG/ML SOLUTION: Performed by: EMERGENCY MEDICINE

## 2018-05-13 PROCEDURE — 84703 CHORIONIC GONADOTROPIN ASSAY: CPT | Performed by: EMERGENCY MEDICINE

## 2018-05-13 PROCEDURE — 81001 URINALYSIS AUTO W/SCOPE: CPT | Performed by: EMERGENCY MEDICINE

## 2018-05-13 RX ORDER — ONDANSETRON 2 MG/ML
4 INJECTION INTRAMUSCULAR; INTRAVENOUS ONCE
Status: COMPLETED | OUTPATIENT
Start: 2018-05-13 | End: 2018-05-13

## 2018-05-13 RX ORDER — MORPHINE SULFATE 2 MG/ML
2 INJECTION, SOLUTION INTRAMUSCULAR; INTRAVENOUS ONCE
Status: COMPLETED | OUTPATIENT
Start: 2018-05-13 | End: 2018-05-13

## 2018-05-13 RX ADMIN — SODIUM CHLORIDE 1000 ML: 9 INJECTION, SOLUTION INTRAVENOUS at 23:41

## 2018-05-13 RX ADMIN — ONDANSETRON 4 MG: 2 INJECTION INTRAMUSCULAR; INTRAVENOUS at 23:41

## 2018-05-13 RX ADMIN — MORPHINE SULFATE 2 MG: 2 INJECTION, SOLUTION INTRAMUSCULAR; INTRAVENOUS at 23:41

## 2018-05-14 ENCOUNTER — ANESTHESIA EVENT (OUTPATIENT)
Dept: PERIOP | Facility: HOSPITAL | Age: 43
End: 2018-05-14

## 2018-05-14 ENCOUNTER — ANESTHESIA (OUTPATIENT)
Dept: PERIOP | Facility: HOSPITAL | Age: 43
End: 2018-05-14

## 2018-05-14 VITALS
DIASTOLIC BLOOD PRESSURE: 62 MMHG | WEIGHT: 218 LBS | HEART RATE: 81 BPM | BODY MASS INDEX: 35.03 KG/M2 | SYSTOLIC BLOOD PRESSURE: 128 MMHG | HEIGHT: 66 IN | RESPIRATION RATE: 14 BRPM | TEMPERATURE: 97.8 F | OXYGEN SATURATION: 95 %

## 2018-05-14 PROBLEM — K35.80 ACUTE APPENDICITIS: Status: ACTIVE | Noted: 2018-05-13

## 2018-05-14 LAB
ABO GROUP BLD: NORMAL
ALBUMIN SERPL-MCNC: 4 G/DL (ref 3.5–5)
ALBUMIN/GLOB SERPL: 1.1 G/DL (ref 1.1–2.5)
ALP SERPL-CCNC: 93 U/L (ref 24–120)
ALT SERPL W P-5'-P-CCNC: 40 U/L (ref 0–54)
ANION GAP SERPL CALCULATED.3IONS-SCNC: 11 MMOL/L (ref 4–13)
AST SERPL-CCNC: 28 U/L (ref 7–45)
BACTERIA UR QL AUTO: ABNORMAL /HPF
BILIRUB SERPL-MCNC: 0.7 MG/DL (ref 0.1–1)
BILIRUB UR QL STRIP: NEGATIVE
BLD GP AB SCN SERPL QL: NEGATIVE
BUN BLD-MCNC: 10 MG/DL (ref 5–21)
BUN/CREAT SERPL: 15.6 (ref 7–25)
CALCIUM SPEC-SCNC: 9.3 MG/DL (ref 8.4–10.4)
CHLORIDE SERPL-SCNC: 104 MMOL/L (ref 98–110)
CLARITY UR: CLEAR
CO2 SERPL-SCNC: 26 MMOL/L (ref 24–31)
COLOR UR: YELLOW
CREAT BLD-MCNC: 0.64 MG/DL (ref 0.5–1.4)
GFR SERPL CREATININE-BSD FRML MDRD: 100 ML/MIN/1.73
GLOBULIN UR ELPH-MCNC: 3.7 GM/DL
GLUCOSE BLD-MCNC: 104 MG/DL (ref 70–100)
GLUCOSE UR STRIP-MCNC: NEGATIVE MG/DL
HCG SERPL QL: NEGATIVE
HGB UR QL STRIP.AUTO: NEGATIVE
HYALINE CASTS UR QL AUTO: ABNORMAL /LPF
KETONES UR QL STRIP: NEGATIVE
LEUKOCYTE ESTERASE UR QL STRIP.AUTO: ABNORMAL
NITRITE UR QL STRIP: NEGATIVE
PH UR STRIP.AUTO: 7.5 [PH] (ref 5–8)
POTASSIUM BLD-SCNC: 3.9 MMOL/L (ref 3.5–5.3)
PROT SERPL-MCNC: 7.7 G/DL (ref 6.3–8.7)
PROT UR QL STRIP: NEGATIVE
RBC # UR: ABNORMAL /HPF
REF LAB TEST METHOD: ABNORMAL
RH BLD: POSITIVE
SODIUM BLD-SCNC: 141 MMOL/L (ref 135–145)
SP GR UR STRIP: 1.02 (ref 1–1.03)
SQUAMOUS #/AREA URNS HPF: ABNORMAL /HPF
T&S EXPIRATION DATE: NORMAL
UROBILINOGEN UR QL STRIP: ABNORMAL
WBC UR QL AUTO: ABNORMAL /HPF

## 2018-05-14 PROCEDURE — 93010 ELECTROCARDIOGRAM REPORT: CPT | Performed by: INTERNAL MEDICINE

## 2018-05-14 PROCEDURE — 25010000002 IOPAMIDOL 61 % SOLUTION: Performed by: EMERGENCY MEDICINE

## 2018-05-14 PROCEDURE — 25010000002 ONDANSETRON PER 1 MG: Performed by: NURSE ANESTHETIST, CERTIFIED REGISTERED

## 2018-05-14 PROCEDURE — 96376 TX/PRO/DX INJ SAME DRUG ADON: CPT

## 2018-05-14 PROCEDURE — 86901 BLOOD TYPING SEROLOGIC RH(D): CPT | Performed by: EMERGENCY MEDICINE

## 2018-05-14 PROCEDURE — 93005 ELECTROCARDIOGRAM TRACING: CPT | Performed by: EMERGENCY MEDICINE

## 2018-05-14 PROCEDURE — 88304 TISSUE EXAM BY PATHOLOGIST: CPT | Performed by: SPECIALIST

## 2018-05-14 PROCEDURE — 25010000002 SUCCINYLCHOLINE PER 20 MG: Performed by: NURSE ANESTHETIST, CERTIFIED REGISTERED

## 2018-05-14 PROCEDURE — 86850 RBC ANTIBODY SCREEN: CPT | Performed by: EMERGENCY MEDICINE

## 2018-05-14 PROCEDURE — 25010000002 MORPHINE SULFATE (PF) 2 MG/ML SOLUTION: Performed by: EMERGENCY MEDICINE

## 2018-05-14 PROCEDURE — 25010000002 PROPOFOL 10 MG/ML EMULSION: Performed by: NURSE ANESTHETIST, CERTIFIED REGISTERED

## 2018-05-14 PROCEDURE — 25010000002 ONDANSETRON PER 1 MG: Performed by: EMERGENCY MEDICINE

## 2018-05-14 PROCEDURE — 25010000002 DEXAMETHASONE PER 1 MG: Performed by: NURSE ANESTHETIST, CERTIFIED REGISTERED

## 2018-05-14 PROCEDURE — 25010000002 NEOSTIGMINE PER 0.5 MG: Performed by: NURSE ANESTHETIST, CERTIFIED REGISTERED

## 2018-05-14 PROCEDURE — 25010000002 KETOROLAC TROMETHAMINE PER 15 MG: Performed by: NURSE ANESTHETIST, CERTIFIED REGISTERED

## 2018-05-14 PROCEDURE — 86900 BLOOD TYPING SEROLOGIC ABO: CPT | Performed by: EMERGENCY MEDICINE

## 2018-05-14 PROCEDURE — 25010000002 FENTANYL CITRATE (PF) 250 MCG/5ML SOLUTION: Performed by: NURSE ANESTHETIST, CERTIFIED REGISTERED

## 2018-05-14 PROCEDURE — 25010000002 ERTAPENEM PER 500 MG: Performed by: EMERGENCY MEDICINE

## 2018-05-14 RX ORDER — ALBUTEROL SULFATE 2.5 MG/3ML
2.5 SOLUTION RESPIRATORY (INHALATION) ONCE AS NEEDED
Status: DISCONTINUED | OUTPATIENT
Start: 2018-05-14 | End: 2018-05-14 | Stop reason: HOSPADM

## 2018-05-14 RX ORDER — SUCCINYLCHOLINE CHLORIDE 20 MG/ML
INJECTION INTRAMUSCULAR; INTRAVENOUS AS NEEDED
Status: DISCONTINUED | OUTPATIENT
Start: 2018-05-14 | End: 2018-05-15 | Stop reason: SURG

## 2018-05-14 RX ORDER — ONDANSETRON 2 MG/ML
4 INJECTION INTRAMUSCULAR; INTRAVENOUS ONCE AS NEEDED
Status: COMPLETED | OUTPATIENT
Start: 2018-05-14 | End: 2018-05-14

## 2018-05-14 RX ORDER — DEXAMETHASONE SODIUM PHOSPHATE 4 MG/ML
INJECTION, SOLUTION INTRA-ARTICULAR; INTRALESIONAL; INTRAMUSCULAR; INTRAVENOUS; SOFT TISSUE AS NEEDED
Status: DISCONTINUED | OUTPATIENT
Start: 2018-05-14 | End: 2018-05-15 | Stop reason: SURG

## 2018-05-14 RX ORDER — IBUPROFEN 600 MG/1
600 TABLET ORAL ONCE AS NEEDED
Status: DISCONTINUED | OUTPATIENT
Start: 2018-05-14 | End: 2018-05-14 | Stop reason: HOSPADM

## 2018-05-14 RX ORDER — FENTANYL CITRATE 50 UG/ML
INJECTION, SOLUTION INTRAMUSCULAR; INTRAVENOUS AS NEEDED
Status: DISCONTINUED | OUTPATIENT
Start: 2018-05-14 | End: 2018-05-15 | Stop reason: SURG

## 2018-05-14 RX ORDER — ONDANSETRON 2 MG/ML
INJECTION INTRAMUSCULAR; INTRAVENOUS AS NEEDED
Status: DISCONTINUED | OUTPATIENT
Start: 2018-05-14 | End: 2018-05-15 | Stop reason: SURG

## 2018-05-14 RX ORDER — MEPERIDINE HYDROCHLORIDE 50 MG/ML
12.5 INJECTION INTRAMUSCULAR; INTRAVENOUS; SUBCUTANEOUS
Status: DISCONTINUED | OUTPATIENT
Start: 2018-05-14 | End: 2018-05-14 | Stop reason: HOSPADM

## 2018-05-14 RX ORDER — HYDROCODONE BITARTRATE AND ACETAMINOPHEN 5; 325 MG/1; MG/1
1 TABLET ORAL ONCE AS NEEDED
Status: DISCONTINUED | OUTPATIENT
Start: 2018-05-14 | End: 2018-05-14 | Stop reason: HOSPADM

## 2018-05-14 RX ORDER — MORPHINE SULFATE 2 MG/ML
2 INJECTION, SOLUTION INTRAMUSCULAR; INTRAVENOUS ONCE
Status: COMPLETED | OUTPATIENT
Start: 2018-05-14 | End: 2018-05-14

## 2018-05-14 RX ORDER — HYDROCODONE BITARTRATE AND ACETAMINOPHEN 5; 325 MG/1; MG/1
1-2 TABLET ORAL EVERY 4 HOURS PRN
Qty: 30 TABLET | Refills: 0 | Status: SHIPPED | OUTPATIENT
Start: 2018-05-14 | End: 2022-07-22

## 2018-05-14 RX ORDER — GLYCOPYRROLATE 0.2 MG/ML
INJECTION INTRAMUSCULAR; INTRAVENOUS AS NEEDED
Status: DISCONTINUED | OUTPATIENT
Start: 2018-05-14 | End: 2018-05-15 | Stop reason: SURG

## 2018-05-14 RX ORDER — PROPOFOL 10 MG/ML
VIAL (ML) INTRAVENOUS AS NEEDED
Status: DISCONTINUED | OUTPATIENT
Start: 2018-05-14 | End: 2018-05-15 | Stop reason: SURG

## 2018-05-14 RX ORDER — SODIUM CHLORIDE 9 MG/ML
INJECTION, SOLUTION INTRAVENOUS AS NEEDED
Status: DISCONTINUED | OUTPATIENT
Start: 2018-05-14 | End: 2018-05-14 | Stop reason: HOSPADM

## 2018-05-14 RX ORDER — ACETAMINOPHEN 500 MG
1000 TABLET ORAL ONCE
Status: COMPLETED | OUTPATIENT
Start: 2018-05-14 | End: 2018-05-14

## 2018-05-14 RX ORDER — KETOROLAC TROMETHAMINE 30 MG/ML
INJECTION, SOLUTION INTRAMUSCULAR; INTRAVENOUS AS NEEDED
Status: DISCONTINUED | OUTPATIENT
Start: 2018-05-14 | End: 2018-05-15 | Stop reason: SURG

## 2018-05-14 RX ORDER — DIPHENHYDRAMINE HYDROCHLORIDE 50 MG/ML
12.5 INJECTION INTRAMUSCULAR; INTRAVENOUS
Status: DISCONTINUED | OUTPATIENT
Start: 2018-05-14 | End: 2018-05-14 | Stop reason: HOSPADM

## 2018-05-14 RX ORDER — DEXAMETHASONE SODIUM PHOSPHATE 4 MG/ML
8 INJECTION, SOLUTION INTRA-ARTICULAR; INTRALESIONAL; INTRAMUSCULAR; INTRAVENOUS; SOFT TISSUE ONCE AS NEEDED
Status: DISCONTINUED | OUTPATIENT
Start: 2018-05-14 | End: 2018-05-14 | Stop reason: HOSPADM

## 2018-05-14 RX ORDER — ROCURONIUM BROMIDE 10 MG/ML
INJECTION, SOLUTION INTRAVENOUS AS NEEDED
Status: DISCONTINUED | OUTPATIENT
Start: 2018-05-14 | End: 2018-05-15 | Stop reason: SURG

## 2018-05-14 RX ORDER — FENTANYL CITRATE 50 UG/ML
25 INJECTION, SOLUTION INTRAMUSCULAR; INTRAVENOUS
Status: DISCONTINUED | OUTPATIENT
Start: 2018-05-14 | End: 2018-05-14 | Stop reason: HOSPADM

## 2018-05-14 RX ORDER — ONDANSETRON 2 MG/ML
4 INJECTION INTRAMUSCULAR; INTRAVENOUS ONCE
Status: COMPLETED | OUTPATIENT
Start: 2018-05-14 | End: 2018-05-14

## 2018-05-14 RX ORDER — BUPIVACAINE HYDROCHLORIDE AND EPINEPHRINE 2.5; 5 MG/ML; UG/ML
INJECTION, SOLUTION INFILTRATION; PERINEURAL AS NEEDED
Status: DISCONTINUED | OUTPATIENT
Start: 2018-05-14 | End: 2018-05-14 | Stop reason: HOSPADM

## 2018-05-14 RX ORDER — FENTANYL CITRATE 50 UG/ML
50 INJECTION, SOLUTION INTRAMUSCULAR; INTRAVENOUS
Status: DISCONTINUED | OUTPATIENT
Start: 2018-05-14 | End: 2018-05-14 | Stop reason: HOSPADM

## 2018-05-14 RX ORDER — SODIUM CHLORIDE, SODIUM LACTATE, POTASSIUM CHLORIDE, CALCIUM CHLORIDE 600; 310; 30; 20 MG/100ML; MG/100ML; MG/100ML; MG/100ML
INJECTION, SOLUTION INTRAVENOUS CONTINUOUS PRN
Status: DISCONTINUED | OUTPATIENT
Start: 2018-05-14 | End: 2018-05-15 | Stop reason: SURG

## 2018-05-14 RX ORDER — DEXTROAMPHETAMINE SACCHARATE, AMPHETAMINE ASPARTATE, DEXTROAMPHETAMINE SULFATE AND AMPHETAMINE SULFATE 2.5; 2.5; 2.5; 2.5 MG/1; MG/1; MG/1; MG/1
10 TABLET ORAL DAILY
COMMUNITY

## 2018-05-14 RX ADMIN — HYDROCODONE BITARTRATE AND ACETAMINOPHEN 1 TABLET: 5; 325 TABLET ORAL at 06:02

## 2018-05-14 RX ADMIN — ONDANSETRON 4 MG: 2 INJECTION, SOLUTION INTRAMUSCULAR; INTRAVENOUS at 06:03

## 2018-05-14 RX ADMIN — KETOROLAC TROMETHAMINE 30 MG: 30 INJECTION, SOLUTION INTRAMUSCULAR at 04:39

## 2018-05-14 RX ADMIN — GLYCOPYRROLATE 0.2 MG: 0.2 INJECTION, SOLUTION INTRAMUSCULAR; INTRAVENOUS at 05:08

## 2018-05-14 RX ADMIN — FENTANYL CITRATE 250 MCG: 50 INJECTION INTRAMUSCULAR; INTRAVENOUS at 04:29

## 2018-05-14 RX ADMIN — Medication 3 MG: at 05:01

## 2018-05-14 RX ADMIN — PROPOFOL 160 MG: 10 INJECTION, EMULSION INTRAVENOUS at 04:29

## 2018-05-14 RX ADMIN — GLYCOPYRROLATE 0.4 MG: 0.2 INJECTION, SOLUTION INTRAMUSCULAR; INTRAVENOUS at 05:01

## 2018-05-14 RX ADMIN — Medication 2 MG: at 05:08

## 2018-05-14 RX ADMIN — SODIUM CHLORIDE, POTASSIUM CHLORIDE, SODIUM LACTATE AND CALCIUM CHLORIDE: 600; 310; 30; 20 INJECTION, SOLUTION INTRAVENOUS at 04:27

## 2018-05-14 RX ADMIN — DEXAMETHASONE SODIUM PHOSPHATE 8 MG: 4 INJECTION, SOLUTION INTRAMUSCULAR; INTRAVENOUS at 04:39

## 2018-05-14 RX ADMIN — ONDANSETRON 4 MG: 2 INJECTION INTRAMUSCULAR; INTRAVENOUS at 02:46

## 2018-05-14 RX ADMIN — ROCURONIUM BROMIDE 20 MG: 10 INJECTION INTRAVENOUS at 04:46

## 2018-05-14 RX ADMIN — ONDANSETRON HYDROCHLORIDE 4 MG: 2 SOLUTION INTRAMUSCULAR; INTRAVENOUS at 04:39

## 2018-05-14 RX ADMIN — ACETAMINOPHEN 1000 MG: 500 TABLET, FILM COATED ORAL at 04:01

## 2018-05-14 RX ADMIN — ROCURONIUM BROMIDE 10 MG: 10 INJECTION INTRAVENOUS at 04:29

## 2018-05-14 RX ADMIN — IOPAMIDOL 100 ML: 612 INJECTION, SOLUTION INTRAVENOUS at 00:20

## 2018-05-14 RX ADMIN — SUCCINYLCHOLINE CHLORIDE 100 MG: 20 INJECTION, SOLUTION INTRAMUSCULAR; INTRAVENOUS at 04:29

## 2018-05-14 RX ADMIN — MORPHINE SULFATE 2 MG: 2 INJECTION, SOLUTION INTRAMUSCULAR; INTRAVENOUS at 02:33

## 2018-05-14 NOTE — ANESTHESIA PROCEDURE NOTES
Airway  Airway not difficult    General Information and Staff    Patient location during procedure: OR  CRNA: MERRILL BECERRIL    Indications and Patient Condition  Indications for airway management: airway protection    Preoxygenated: yes  Mask difficulty assessment: 0 - not attempted    Final Airway Details  Final airway type: endotracheal airway      Successful airway: ETT  Cuffed: yes   Successful intubation technique: direct laryngoscopy  Facilitating devices/methods: intubating stylet and cricoid pressure  Endotracheal tube insertion site: oral  Blade: Cody  Blade size: #3  ETT size: 7.0 mm  Cormack-Lehane Classification: grade I - full view of glottis  Placement verified by: chest auscultation and capnometry   Cuff volume (mL): 6  Measured from: lips  ETT to lips (cm): 21  Number of attempts at approach: 1    Additional Comments  ATRAUMATIC INTUBATION

## 2018-05-14 NOTE — H&P
Ange Dominguez MD FACS History and Physical     Referring Provider: No ref. provider found    Patient Care Team:  Aliza Panda DO as PCP - General  Aliza Panda DO as PCP - Family Medicine    Chief complaint abdominal pain    Subjective .     History of present illness:  The patient is a 43 y.o. female who presents complaining of acute onset periumbilical pain with radiation to the right lower quadrant.  Imaging demonstrated acute appendicitis.    Review of Systems    Review of Systems - General ROS: negative  ENT ROS: negative  Respiratory ROS: no cough, shortness of breath, or wheezing  Cardiovascular ROS: no chest pain or dyspnea on exertion  Gastrointestinal ROS: no abdominal pain, change in bowel habits, or black or bloody stools  Genito-Urinary ROS: no dysuria, trouble voiding, or hematuria  Dermatological ROS: negative   Breast ROS: negative for breast lumps  Hematological and Lymphatic ROS: negative  Musculoskeletal ROS: negative   Neurological ROS: no TIA or stroke symptoms    Psychological ROS: negative  Endocrine ROS: negative    History  Past Medical History:   Diagnosis Date   • Acid reflux    • Disease of thyroid gland    , Past Surgical History:   Procedure Laterality Date   • CHOLECYSTECTOMY     , Family History   Problem Relation Age of Onset   • Diabetes Mother    • Hypothyroidism Mother    • Heart disease Father    • Hypertension Father    • Diabetes Father    • Cancer Maternal Grandfather    • Cancer Paternal Grandfather    • Breast cancer Neg Hx    , Social History   Substance Use Topics   • Smoking status: Never Smoker   • Smokeless tobacco: Never Used   • Alcohol use Yes      Comment: occasional only   ,   (Not in a hospital admission) and Allergies:  Bactrim [sulfamethoxazole-trimethoprim]    Current Facility-Administered Medications:   •  ertapenem (INVanz) 1 g/100 mL 0.9% NS VTB (mbp), 1 g, Intravenous, Once, Franki Maravilla MD, Stopped at 05/14/18 0078    Current  Outpatient Prescriptions:   •  amphetamine-dextroamphetamine (ADDERALL) 10 MG tablet, Take 10 mg by mouth Daily., Disp: , Rfl:   •  Levothyroxine Sodium (SYNTHROID PO), Take  by mouth Daily., Disp: , Rfl:   •  norethindrone-ethinyl estradiol (ORTHO-NOVUM 1/35, 28,) 1-35 MG-MCG per tablet, Take 1 tablet by mouth Daily., Disp: , Rfl:   •  RABEprazole Sodium (ACIPHEX PO), Take  by mouth Every Night., Disp: , Rfl:     Objective     Vital Signs   Temp:  [97.9 °F (36.6 °C)-98 °F (36.7 °C)] 97.9 °F (36.6 °C)  Heart Rate:  [76-94] 80  Resp:  [16] 16  BP: (145-151)/(81-86) 145/81    Physical Exam:  General appearance - alert, well appearing, and in no distress  Mental status - alert, oriented to person, place, and time  Neck - supple, no significant adenopathy  Chest - clear to auscultation, no wheezes, rales or rhonchi, symmetric air entry  Heart - normal rate, regular rhythm, normal S1, S2, no murmurs, rubs, clicks or gallops  Abdomen - soft, point tenderness RLQ  Neurological - alert, oriented, normal speech, no focal findings or movement disorder noted  Musculoskeletal - no joint tenderness, deformity or swelling  Extremities - peripheral pulses normal, no pedal edema, no clubbing or cyanosis    Results Review:     Lab Results (last 24 hours)     Procedure Component Value Units Date/Time    Comprehensive Metabolic Panel [[id number]]  (Abnormal) Collected:  05/13/18 2341    Specimen:  Blood Updated:  05/14/18 0002     Glucose 104 (H) mg/dL      BUN 10 mg/dL      Creatinine 0.64 mg/dL      Sodium 141 mmol/L      Potassium 3.9 mmol/L      Chloride 104 mmol/L      CO2 26.0 mmol/L      Calcium 9.3 mg/dL      Total Protein 7.7 g/dL      Albumin 4.00 g/dL      ALT (SGPT) 40 U/L      AST (SGOT) 28 U/L      Alkaline Phosphatase 93 U/L      Total Bilirubin 0.7 mg/dL      eGFR Non African Amer 100 mL/min/1.73      Globulin 3.7 gm/dL      A/G Ratio 1.1 g/dL      BUN/Creatinine Ratio 15.6     Anion Gap 11.0 mmol/L     hCG, Serum,  Qualitative [[id number]]  (Normal) Collected:  05/13/18 2341    Specimen:  Blood Updated:  05/14/18 0002     HCG Qualitative Negative    Urinalysis With / Microscopic If Indicated - Urine, Catheter [[id number]]  (Abnormal) Collected:  05/13/18 2341    Specimen:  Urine from Urine, Catheter Updated:  05/14/18 0000     Color, UA Yellow     Appearance, UA Clear     pH, UA 7.5     Specific Gravity, UA 1.023     Glucose, UA Negative     Ketones, UA Negative     Bilirubin, UA Negative     Blood, UA Negative     Protein, UA Negative     Leuk Esterase, UA Small (1+) (A)     Nitrite, UA Negative     Urobilinogen, UA 0.2 E.U./dL    Urinalysis, Microscopic Only - Urine, Clean Catch [[id number]]  (Abnormal) Collected:  05/13/18 2341    Specimen:  Urine from Urine, Catheter Updated:  05/14/18 0000     RBC, UA 3-5 (A) /HPF      WBC, UA 6-12 (A) /HPF      Bacteria, UA None Seen /HPF      Squamous Epithelial Cells, UA 3-6 (A) /HPF      Hyaline Casts, UA 0-2 /LPF      Methodology Automated Microscopy    CBC & Differential [[id number]] Collected:  05/13/18 2341    Specimen:  Blood Updated:  05/13/18 2353    Narrative:       The following orders were created for panel order CBC & Differential.  Procedure                               Abnormality         Status                     ---------                               -----------         ------                     CBC Auto Differential[[id number]]         Abnormal            Final result                 Please view results for these tests on the individual orders.    CBC Auto Differential [[id number]]  (Abnormal) Collected:  05/13/18 2341    Specimen:  Blood Updated:  05/13/18 2353     WBC 13.25 (H) 10*3/mm3      RBC 4.91 10*6/mm3      Hemoglobin 14.3 g/dL      Hematocrit 41.6 %      MCV 84.7 fL      MCH 29.1 pg      MCHC 34.4 g/dL      RDW 13.0 %      RDW-SD 40.1 fl      MPV 10.1 fL      Platelets 314 10*3/mm3      Neutrophil % 70.9 %      Lymphocyte % 19.5 %      Monocyte % 6.9 %       Eosinophil % 1.7 %      Basophil % 0.5 %      Immature Grans % 0.5 %      Neutrophils, Absolute 9.38 (H) 10*3/mm3      Lymphocytes, Absolute 2.59 10*3/mm3      Monocytes, Absolute 0.92 10*3/mm3      Eosinophils, Absolute 0.22 10*3/mm3      Basophils, Absolute 0.07 10*3/mm3      Immature Grans, Absolute 0.07 (H) 10*3/mm3      nRBC 0.0 /100 WBC         Imaging Results (last 24 hours)     Procedure Component Value Units Date/Time    CT Abdomen Pelvis With Contrast [[id number]] Updated:  05/14/18 0024            Assessment/Plan       Acute appendicitis.  She will undergo laparoscopic appendectomy with possible need for conversion to open.  The risks, benefits, complications, and possible alternatives were disucssed with the patient who agreed to proceed.      Ange Dominguez MD  05/14/18  3:46 AM

## 2018-05-14 NOTE — OP NOTE
Preoperative diagnosis:  Acute appendicitis  Postoperative diagnosis:  Same  Procedure:  Laparoscopic appendectomy  Surgeon:  Ange Dominguez MD  Anesthesia:  Get loc  Ebl:  Minimal  Ivf:  See anesthesia   Indications:  The patient is a 43-year-old female who presents for lap appy.  The risks, benefits, complications, and possible alternatives were discussed with the patient who agreed to proceed.  Description of procedure:  The patient was laid supine.  The abdomen was prepped and draped.  The abdomen was entered with veress needle.  Trocars were placed. The appendix was dilated and inflamed.  An opening was created at the base of the appendix at the mesoappendix.  An endogia 35mm blue stapler was used to transect the base of the appendix. An endogia 35mm white stapler was used to transect the mesoappendix.  The appendix was placed in an endocatch bag and removed.  The fascia of the left lower quadrant port site was closed with 0 vicryl using an endostitch applier.  The skin was closed with 3-0 and 4-0 vicyrl.  The sponge, needle, and instrument counts were correct.  Complications:  None  Disposition:  Good to pacu  Findings:  nonperforated

## 2018-05-14 NOTE — ANESTHESIA POSTPROCEDURE EVALUATION
"Patient: Aliza Garcia    Procedure Summary     Date:  05/14/18 Room / Location:   [facility] 06 /  [facility]    Anesthesia Start:  0427 Anesthesia Stop:      Procedure:  APPENDECTOMY LAPAROSCOPIC (N/A Abdomen) Diagnosis:       Acute appendicitis, unspecified acute appendicitis type      (Acute appendicitis, unspecified acute appendicitis type [K35.80])    Surgeon:  Ange Dominguez MD Provider:  Isai Baum CRNA    Anesthesia Type:  Not recorded ASA Status:  Not recorded          Anesthesia Type: No value filed.  Last vitals  BP   128/62 (05/14/18 0630)   Temp   97.8 °F (36.6 °C) (05/14/18 0630)   Pulse   81 (05/14/18 0630)   Resp   14 (05/14/18 0630)     SpO2   95 % (05/14/18 0630)     Post Anesthesia Care and Evaluation    Patient location during evaluation: PACU  Patient participation: complete - patient participated  Level of consciousness: awake and alert  Pain management: adequate  Airway patency: patent  Anesthetic complications: No anesthetic complications    Cardiovascular status: acceptable  Respiratory status: acceptable  Hydration status: acceptable    Comments: Blood pressure 128/62, pulse 81, temperature 97.8 °F (36.6 °C), temperature source Temporal Artery , resp. rate 14, height 167.6 cm (66\"), weight 98.9 kg (218 lb), SpO2 95 %.    Pt discharged from PACU based on melody score >8      "

## 2018-05-14 NOTE — ED PROVIDER NOTES
Subjective     Flank Pain   Pain location:  R flank  Pain quality: aching and bloating    Pain radiates to:  Does not radiate  Pain severity:  Moderate  Onset quality:  Gradual  Timing:  Intermittent  Progression:  Worsening  Chronicity:  New  Context: not alcohol use, not awakening from sleep, not diet changes, not eating, not laxative use, not previous surgeries, not recent illness, not recent sexual activity, not recent travel, not sick contacts, not suspicious food intake and not trauma    Relieved by:  Nothing  Worsened by:  Nothing  Ineffective treatments:  None tried  Associated symptoms: fever    Associated symptoms: no anorexia, no belching, no chest pain, no chills, no constipation, no dysuria, no fatigue, no flatus, no hematemesis, no hematochezia, no melena, no nausea and no vaginal bleeding    Risk factors: no alcohol abuse, no NSAID use and not obese    Fever   Associated symptoms: no chest pain, no chills, no dysuria and no nausea        Review of Systems   Constitutional: Positive for fever. Negative for chills and fatigue.   HENT: Negative.    Eyes: Negative.    Respiratory: Negative.    Cardiovascular: Negative.  Negative for chest pain.   Gastrointestinal: Negative for anorexia, constipation, flatus, hematemesis, hematochezia, melena and nausea.   Endocrine: Negative.    Genitourinary: Positive for flank pain. Negative for dysuria and vaginal bleeding.   Skin: Negative.    Neurological: Negative.    Hematological: Negative.    All other systems reviewed and are negative.      Past Medical History:   Diagnosis Date   • Acid reflux    • Disease of thyroid gland        Allergies   Allergen Reactions   • Bactrim [Sulfamethoxazole-Trimethoprim] Rash       Past Surgical History:   Procedure Laterality Date   • CHOLECYSTECTOMY         Family History   Problem Relation Age of Onset   • Diabetes Mother    • Hypothyroidism Mother    • Heart disease Father    • Hypertension Father    • Diabetes Father    •  Cancer Maternal Grandfather    • Cancer Paternal Grandfather    • Breast cancer Neg Hx        Social History     Social History   • Marital status:      Social History Main Topics   • Smoking status: Never Smoker   • Smokeless tobacco: Never Used   • Alcohol use Yes      Comment: occasional only   • Drug use: No   • Sexual activity: Defer     Other Topics Concern   • Not on file           Objective   Physical Exam   Constitutional: She is oriented to person, place, and time. She appears well-developed and well-nourished.  Non-toxic appearance.   HENT:   Head: Normocephalic and atraumatic.   Mouth/Throat: Oropharynx is clear and moist.   Eyes: Conjunctivae are normal. Pupils are equal, round, and reactive to light.   Neck: Normal range of motion. Neck supple. No hepatojugular reflux and no JVD present.   Cardiovascular: Normal rate, regular rhythm, normal heart sounds and intact distal pulses.  PMI is not displaced.  Exam reveals no decreased pulses.    No murmur heard.  Pulmonary/Chest: Effort normal and breath sounds normal. No accessory muscle usage. No apnea. No respiratory distress. She has no decreased breath sounds. She has no wheezes.   Abdominal: Normal appearance, normal aorta and bowel sounds are normal. She exhibits no shifting dullness, no distension, no fluid wave, no abdominal bruit, no ascites, no pulsatile midline mass and no mass. There is tenderness. There is no guarding.   Musculoskeletal: Normal range of motion.   Neurological: She is alert and oriented to person, place, and time. She has normal strength and normal reflexes. No cranial nerve deficit. GCS eye subscore is 4. GCS verbal subscore is 5. GCS motor subscore is 6.   Skin: Skin is warm and dry.   Psychiatric: She has a normal mood and affect. Her behavior is normal.   Nursing note and vitals reviewed.      Procedures           ED Course  ED Course   Comment By Time   Case discussed with Dr. Ange Dominguez the patient will be  admitted Franki Maravilla MD 05/14 0116                  [facility]      Final diagnoses:   Acute appendicitis, unspecified acute appendicitis type            Franki Maravilla MD  05/14/18 0118

## 2018-05-15 NOTE — ANESTHESIA PREPROCEDURE EVALUATION
Anesthesia Evaluation     Patient summary reviewed   NPO Solid Status: > 8 hours  NPO Liquid Status: > 8 hours           Airway   Mallampati: I  TM distance: >3 FB  Neck ROM: full  No difficulty expected  Dental - normal exam     Pulmonary - negative pulmonary ROS and normal exam   Cardiovascular - negative cardio ROS and normal exam        Neuro/Psych  GI/Hepatic/Renal/Endo    (+)  GERD poorly controlled,  hypothyroidism,     Musculoskeletal     Abdominal  - normal exam   Substance History      OB/GYN          Other                        Anesthesia Plan    ASA 2 - emergent     general     intravenous induction   Anesthetic plan and risks discussed with patient and spouse/significant other.

## 2018-05-17 LAB
CYTO UR: NORMAL
LAB AP CASE REPORT: NORMAL
Lab: NORMAL
PATH REPORT.FINAL DX SPEC: NORMAL
PATH REPORT.GROSS SPEC: NORMAL

## 2018-09-01 ENCOUNTER — TRANSCRIBE ORDERS (OUTPATIENT)
Dept: LAB | Facility: HOSPITAL | Age: 43
End: 2018-09-01

## 2018-09-01 ENCOUNTER — APPOINTMENT (OUTPATIENT)
Dept: LAB | Facility: HOSPITAL | Age: 43
End: 2018-09-01
Attending: FAMILY MEDICINE

## 2018-09-01 DIAGNOSIS — Z00.00 ROUTINE GENERAL MEDICAL EXAMINATION AT A HEALTH CARE FACILITY: Primary | ICD-10-CM

## 2018-09-01 LAB
ALBUMIN SERPL-MCNC: 4.1 G/DL (ref 3.5–5)
ALBUMIN/GLOB SERPL: 1.1 G/DL (ref 1.1–2.5)
ALP SERPL-CCNC: 91 U/L (ref 24–120)
ALT SERPL W P-5'-P-CCNC: 52 U/L (ref 0–54)
ANION GAP SERPL CALCULATED.3IONS-SCNC: 9 MMOL/L (ref 4–13)
ARTICHOKE IGE QN: 197 MG/DL (ref 0–99)
AST SERPL-CCNC: 38 U/L (ref 7–45)
BASOPHILS # BLD AUTO: 0.05 10*3/MM3 (ref 0–0.2)
BASOPHILS NFR BLD AUTO: 0.5 % (ref 0–2)
BILIRUB SERPL-MCNC: 0.4 MG/DL (ref 0.1–1)
BUN BLD-MCNC: 12 MG/DL (ref 5–21)
BUN/CREAT SERPL: 17.4 (ref 7–25)
CALCIUM SPEC-SCNC: 9.5 MG/DL (ref 8.4–10.4)
CHLORIDE SERPL-SCNC: 103 MMOL/L (ref 98–110)
CHOLEST SERPL-MCNC: 264 MG/DL (ref 130–200)
CO2 SERPL-SCNC: 28 MMOL/L (ref 24–31)
CREAT BLD-MCNC: 0.69 MG/DL (ref 0.5–1.4)
DEPRECATED RDW RBC AUTO: 41.9 FL (ref 40–54)
EOSINOPHIL # BLD AUTO: 0.21 10*3/MM3 (ref 0–0.7)
EOSINOPHIL NFR BLD AUTO: 2.2 % (ref 0–4)
ERYTHROCYTE [DISTWIDTH] IN BLOOD BY AUTOMATED COUNT: 12.9 % (ref 12–15)
GFR SERPL CREATININE-BSD FRML MDRD: 92 ML/MIN/1.73
GLOBULIN UR ELPH-MCNC: 3.6 GM/DL
GLUCOSE BLD-MCNC: 92 MG/DL (ref 70–100)
HCT VFR BLD AUTO: 43.3 % (ref 37–47)
HDLC SERPL-MCNC: 41 MG/DL
HGB BLD-MCNC: 14.8 G/DL (ref 12–16)
IMM GRANULOCYTES # BLD: 0.02 10*3/MM3 (ref 0–0.03)
IMM GRANULOCYTES NFR BLD: 0.2 % (ref 0–5)
LDLC/HDLC SERPL: 4.85 {RATIO}
LYMPHOCYTES # BLD AUTO: 2.19 10*3/MM3 (ref 0.72–4.86)
LYMPHOCYTES NFR BLD AUTO: 22.8 % (ref 15–45)
MCH RBC QN AUTO: 30.1 PG (ref 28–32)
MCHC RBC AUTO-ENTMCNC: 34.2 G/DL (ref 33–36)
MCV RBC AUTO: 88 FL (ref 82–98)
MONOCYTES # BLD AUTO: 0.58 10*3/MM3 (ref 0.19–1.3)
MONOCYTES NFR BLD AUTO: 6 % (ref 4–12)
NEUTROPHILS # BLD AUTO: 6.55 10*3/MM3 (ref 1.87–8.4)
NEUTROPHILS NFR BLD AUTO: 68.3 % (ref 39–78)
NRBC BLD MANUAL-RTO: 0 /100 WBC (ref 0–0)
PLATELET # BLD AUTO: 300 10*3/MM3 (ref 130–400)
PMV BLD AUTO: 9.9 FL (ref 6–12)
POTASSIUM BLD-SCNC: 4.2 MMOL/L (ref 3.5–5.3)
PROT SERPL-MCNC: 7.7 G/DL (ref 6.3–8.7)
RBC # BLD AUTO: 4.92 10*6/MM3 (ref 4.2–5.4)
SODIUM BLD-SCNC: 140 MMOL/L (ref 135–145)
TRIGL SERPL-MCNC: 121 MG/DL (ref 0–149)
TSH SERPL DL<=0.05 MIU/L-ACNC: 0.24 MIU/ML (ref 0.47–4.68)
URATE SERPL-MCNC: 5.2 MG/DL (ref 2.7–7.5)
WBC NRBC COR # BLD: 9.6 10*3/MM3 (ref 4.8–10.8)

## 2018-09-01 PROCEDURE — 85025 COMPLETE CBC W/AUTO DIFF WBC: CPT

## 2018-09-01 PROCEDURE — 84436 ASSAY OF TOTAL THYROXINE: CPT

## 2018-09-01 PROCEDURE — 84550 ASSAY OF BLOOD/URIC ACID: CPT

## 2018-09-01 PROCEDURE — 84443 ASSAY THYROID STIM HORMONE: CPT

## 2018-09-01 PROCEDURE — 84480 ASSAY TRIIODOTHYRONINE (T3): CPT

## 2018-09-01 PROCEDURE — 36415 COLL VENOUS BLD VENIPUNCTURE: CPT

## 2018-09-01 PROCEDURE — 80061 LIPID PANEL: CPT

## 2018-09-01 PROCEDURE — 80053 COMPREHEN METABOLIC PANEL: CPT

## 2018-09-02 LAB
T3 SERPL-MCNC: 180 NG/DL (ref 71–180)
T4 SERPL-MCNC: 13.3 UG/DL (ref 4.5–12)

## 2019-07-18 ENCOUNTER — OFFICE VISIT (OUTPATIENT)
Dept: URGENT CARE | Age: 44
End: 2019-07-18
Payer: COMMERCIAL

## 2019-07-18 ENCOUNTER — HOSPITAL ENCOUNTER (OUTPATIENT)
Dept: GENERAL RADIOLOGY | Age: 44
Discharge: HOME | End: 2019-07-18
Payer: COMMERCIAL

## 2019-07-18 ENCOUNTER — NURSE TRIAGE (OUTPATIENT)
Dept: OTHER | Facility: CLINIC | Age: 44
End: 2019-07-18

## 2019-07-18 VITALS
OXYGEN SATURATION: 98 % | WEIGHT: 234 LBS | SYSTOLIC BLOOD PRESSURE: 182 MMHG | DIASTOLIC BLOOD PRESSURE: 106 MMHG | TEMPERATURE: 98.7 F | BODY MASS INDEX: 37.61 KG/M2 | HEIGHT: 66 IN | RESPIRATION RATE: 20 BRPM | HEART RATE: 99 BPM

## 2019-07-18 DIAGNOSIS — M79.672 ACUTE FOOT PAIN, LEFT: Primary | ICD-10-CM

## 2019-07-18 DIAGNOSIS — M79.672 ACUTE FOOT PAIN, LEFT: ICD-10-CM

## 2019-07-18 DIAGNOSIS — S92.355A CLOSED NONDISPLACED FRACTURE OF FIFTH METATARSAL BONE OF LEFT FOOT, INITIAL ENCOUNTER: ICD-10-CM

## 2019-07-18 PROCEDURE — 99201 PR OFFICE OUTPATIENT NEW 10 MINUTES: CPT | Performed by: NURSE PRACTITIONER

## 2019-07-18 PROCEDURE — 73630 X-RAY EXAM OF FOOT: CPT

## 2019-07-18 RX ORDER — LEVOTHYROXINE SODIUM 0.15 MG/1
TABLET ORAL DAILY
COMMUNITY

## 2019-07-18 RX ORDER — NORETHINDRONE AND ETHINYL ESTRADIOL 7 DAYS X 3
KIT ORAL DAILY
Refills: 11 | COMMUNITY
Start: 2019-07-02

## 2019-07-18 RX ORDER — RABEPRAZOLE SODIUM 20 MG/1
TABLET, DELAYED RELEASE ORAL DAILY
COMMUNITY

## 2019-07-18 RX ORDER — DEXTROAMPHETAMINE SACCHARATE, AMPHETAMINE ASPARTATE, DEXTROAMPHETAMINE SULFATE AND AMPHETAMINE SULFATE 2.5; 2.5; 2.5; 2.5 MG/1; MG/1; MG/1; MG/1
10 TABLET ORAL DAILY PRN
COMMUNITY

## 2019-07-18 RX ORDER — CELECOXIB 200 MG/1
CAPSULE ORAL
Refills: 0 | COMMUNITY
Start: 2019-07-02 | End: 2021-10-01

## 2019-07-18 SDOH — HEALTH STABILITY: MENTAL HEALTH: HOW OFTEN DO YOU HAVE A DRINK CONTAINING ALCOHOL?: NEVER

## 2019-07-18 ASSESSMENT — ENCOUNTER SYMPTOMS
ALLERGIC/IMMUNOLOGIC NEGATIVE: 1
BACK PAIN: 0
COUGH: 0
SINUS PRESSURE: 0
ABDOMINAL PAIN: 0
SHORTNESS OF BREATH: 0
EYES NEGATIVE: 1
SORE THROAT: 0

## 2019-07-18 NOTE — PATIENT INSTRUCTIONS
can you care for yourself at home? · Be safe with medicines. Read and follow all instructions on the label. ? If the doctor gave you a prescription medicine for pain, take it as prescribed. ? If you are not taking a prescription pain medicine, ask your doctor if you can take an over-the-counter medicine. · Follow your doctor's instructions about how much weight you can put on your foot and when you can go back to your usual activities. If you were given crutches, use them as directed. · Put ice or a cold pack on your foot for 10 to 20 minutes at a time. Try to do this every 1 to 2 hours for the next 3 days (when you are awake) or until the swelling goes down. Put a thin cloth between the ice and your skin. · Prop up your foot on a pillow when you ice it or anytime you sit or lie down for the next 3 days. Try to keep it above the level of your heart. This will help reduce swelling. Cast and splint care  · If your foot is in a cast or splint, follow the cast or splint care instructions your doctor gives you. If you have a removable fiberglass walking cast or a splint, do not take it off unless your doctor tells you to. · Keep your cast or splint dry. If you have a removable fiberglass walking cast or a splint, ask your doctor if it is okay to remove it to bathe. Your doctor may want you to keep it on as much as possible. · If you are told to keep your cast or splint on, tape a sheet of plastic to cover it when you bathe. Water under the cast or splint can cause your skin to itch and hurt. · Never cut your cast or stick anything down inside it to scratch an itch on your leg. When should you call for help? Call 911 anytime you think you may need emergency care. For example, call if:    · You have symptoms of a blood clot in your lung (called a pulmonary embolism). These may include:  ? Sudden chest pain. ? Trouble breathing. ?  Coughing up blood.     · You passed out (lost consciousness).    Call your

## 2019-07-18 NOTE — PROGRESS NOTES
[address] [facility]  [address] [address] [address]  [address]  [address] [address]  Dept: [phone]  Loc: [phone]    Zachary Salazar is a 53 y.o. female who presents today for her medical conditions/complaintsas noted below. Zachary Salazar is c/o of Foot Pain (Pt is here for a workmans comp. Pt works for [employer]. left foot pain. Injury happened today @ around 12:45 at her patients home. Pt was working when she stepped to the left and her foot popped. Pain is a 7 on numeric pain scale.  )        HPI:     Foot Pain    The pain is present in the left foot. This is a new problem. The current episode started today. History of extremity trauma: Hx of stress fracture left foot 2 months ago. The problem occurs constantly. The problem has been gradually worsening. The quality of the pain is described as aching, burning and sharp. The pain is at a severity of 7/10. The pain is moderate. Associated symptoms include an inability to bear weight, joint swelling and a limited range of motion. Pertinent negatives include no fever, itching, joint locking, numbness, stiffness or tingling. The symptoms are aggravated by standing. She has tried nothing for the symptoms. Family history does not include gout or rheumatoid arthritis. There is no history of diabetes. Pt had a fracture of 4th metatarsal in April 2019 and wore a boot for 8 weeks     Jayshree Sun was changing a dressing at a pt's home and stepped over and felt a pop in the top of her foot and was able to catch herself on the bed and did not fall down completely. After she got to the  she is now unable to bear weight on her left foot. She was seeing Dr. Sammie Mar at [address] recently for a stress fracture of her left foot in April. Past Medical History:   Diagnosis Date    Hypothyroidism      Past Surgical History:   Procedure Laterality Date    CHOLECYSTECTOMY      PANCREATECTOMY         History reviewed.  No pertinent family

## 2019-07-22 ENCOUNTER — NURSE TRIAGE (OUTPATIENT)
Dept: OTHER | Facility: CLINIC | Age: 44
End: 2019-07-22

## 2019-07-23 LAB
T4 FREE: 1.1 NG/DL (ref 0.9–1.7)
TSH SERPL DL<=0.05 MIU/L-ACNC: 4.77 UIU/ML (ref 0.27–4.2)

## 2019-12-03 ENCOUNTER — TRANSCRIBE ORDERS (OUTPATIENT)
Dept: ADMINISTRATIVE | Facility: HOSPITAL | Age: 44
End: 2019-12-03

## 2019-12-03 DIAGNOSIS — Z12.31 ENCOUNTER FOR SCREENING MAMMOGRAM FOR MALIGNANT NEOPLASM OF BREAST: Primary | ICD-10-CM

## 2019-12-05 ENCOUNTER — HOSPITAL ENCOUNTER (OUTPATIENT)
Dept: MAMMOGRAPHY | Facility: HOSPITAL | Age: 44
Discharge: HOME | End: 2019-12-05
Admitting: FAMILY MEDICINE

## 2019-12-05 DIAGNOSIS — Z12.31 ENCOUNTER FOR SCREENING MAMMOGRAM FOR MALIGNANT NEOPLASM OF BREAST: ICD-10-CM

## 2019-12-05 PROCEDURE — 77067 SCR MAMMO BI INCL CAD: CPT

## 2019-12-05 PROCEDURE — 77063 BREAST TOMOSYNTHESIS BI: CPT

## 2021-03-01 ENCOUNTER — TRANSCRIBE ORDERS (OUTPATIENT)
Dept: ADMINISTRATIVE | Facility: HOSPITAL | Age: 46
End: 2021-03-01

## 2021-03-01 DIAGNOSIS — Z12.31 ENCOUNTER FOR SCREENING MAMMOGRAM FOR MALIGNANT NEOPLASM OF BREAST: Primary | ICD-10-CM

## 2021-03-04 ENCOUNTER — TRANSCRIBE ORDERS (OUTPATIENT)
Dept: ADMINISTRATIVE | Facility: HOSPITAL | Age: 46
End: 2021-03-04

## 2021-03-04 DIAGNOSIS — R94.5 ABNORMAL RESULTS OF LIVER FUNCTION STUDIES: Primary | ICD-10-CM

## 2021-03-10 ENCOUNTER — HOSPITAL ENCOUNTER (OUTPATIENT)
Dept: ULTRASOUND IMAGING | Facility: HOSPITAL | Age: 46
Discharge: HOME | End: 2021-03-10

## 2021-03-10 ENCOUNTER — HOSPITAL ENCOUNTER (OUTPATIENT)
Dept: MAMMOGRAPHY | Facility: HOSPITAL | Age: 46
Discharge: HOME | End: 2021-03-10

## 2021-03-10 DIAGNOSIS — Z12.31 ENCOUNTER FOR SCREENING MAMMOGRAM FOR MALIGNANT NEOPLASM OF BREAST: ICD-10-CM

## 2021-03-10 DIAGNOSIS — R94.5 ABNORMAL RESULTS OF LIVER FUNCTION STUDIES: ICD-10-CM

## 2021-03-10 PROCEDURE — 77067 SCR MAMMO BI INCL CAD: CPT

## 2021-03-10 PROCEDURE — 76705 ECHO EXAM OF ABDOMEN: CPT

## 2021-03-10 PROCEDURE — 77063 BREAST TOMOSYNTHESIS BI: CPT

## 2021-10-01 ENCOUNTER — OFFICE VISIT (OUTPATIENT)
Dept: GASTROENTEROLOGY | Age: 46
End: 2021-10-01
Payer: COMMERCIAL

## 2021-10-01 VITALS
HEART RATE: 85 BPM | SYSTOLIC BLOOD PRESSURE: 139 MMHG | WEIGHT: 240 LBS | HEIGHT: 69 IN | OXYGEN SATURATION: 98 % | BODY MASS INDEX: 35.55 KG/M2 | DIASTOLIC BLOOD PRESSURE: 80 MMHG

## 2021-10-01 DIAGNOSIS — R10.84 GENERALIZED ABDOMINAL PAIN: ICD-10-CM

## 2021-10-01 DIAGNOSIS — R19.5 PENCILLING OF STOOLS: ICD-10-CM

## 2021-10-01 DIAGNOSIS — R12 CHRONIC HEARTBURN: ICD-10-CM

## 2021-10-01 DIAGNOSIS — R74.01 TRANSAMINITIS: Primary | ICD-10-CM

## 2021-10-01 DIAGNOSIS — R19.7 DIARRHEA, UNSPECIFIED TYPE: ICD-10-CM

## 2021-10-01 PROCEDURE — 99204 OFFICE O/P NEW MOD 45 MIN: CPT | Performed by: NURSE PRACTITIONER

## 2021-10-01 ASSESSMENT — ENCOUNTER SYMPTOMS
BLOOD IN STOOL: 0
VOMITING: 0
CONSTIPATION: 0
ABDOMINAL DISTENTION: 0
ABDOMINAL PAIN: 1
TROUBLE SWALLOWING: 0
ANAL BLEEDING: 0
BACK PAIN: 1
SORE THROAT: 0
SHORTNESS OF BREATH: 0
COUGH: 0
VOICE CHANGE: 0
DIARRHEA: 1
NAUSEA: 0
RECTAL PAIN: 0

## 2021-10-01 NOTE — PATIENT INSTRUCTIONS

## 2021-10-05 DIAGNOSIS — R79.89 ELEVATED FERRITIN: Primary | ICD-10-CM

## 2021-10-06 ENCOUNTER — TELEPHONE (OUTPATIENT)
Dept: GASTROENTEROLOGY | Age: 46
End: 2021-10-06

## 2021-10-06 NOTE — TELEPHONE ENCOUNTER
Called and s/w Juancho Farias in Ref lab. She stated those are individual components to the MAICOL w/ reflex. Pt will not be charged for those. Juancho Farias is going to call [facility] to educate them on this. She apologized, she was off that day or she would have caught this.

## 2021-10-06 NOTE — TELEPHONE ENCOUNTER
Karen, there are a lot of labs coming back on this pt that are in my name that I did not order. 6 labs. You can see the ones that were done that I didn't order. Please file a safecare   Thanks!

## 2021-10-29 ENCOUNTER — OFFICE VISIT (OUTPATIENT)
Dept: GASTROENTEROLOGY | Age: 46
End: 2021-10-29
Payer: COMMERCIAL

## 2021-10-29 VITALS
OXYGEN SATURATION: 98 % | HEART RATE: 88 BPM | SYSTOLIC BLOOD PRESSURE: 132 MMHG | DIASTOLIC BLOOD PRESSURE: 74 MMHG | BODY MASS INDEX: 39.5 KG/M2 | WEIGHT: 245.8 LBS | HEIGHT: 66 IN

## 2021-10-29 DIAGNOSIS — E66.9 OBESITY (BMI 30-39.9): ICD-10-CM

## 2021-10-29 DIAGNOSIS — R74.01 TRANSAMINITIS: Primary | ICD-10-CM

## 2021-10-29 PROCEDURE — 99213 OFFICE O/P EST LOW 20 MIN: CPT | Performed by: NURSE PRACTITIONER

## 2021-10-29 ASSESSMENT — ENCOUNTER SYMPTOMS
NAUSEA: 0
COUGH: 0
TROUBLE SWALLOWING: 0
BLOOD IN STOOL: 0
BACK PAIN: 1
VOMITING: 0
VOICE CHANGE: 0
SHORTNESS OF BREATH: 0
ABDOMINAL DISTENTION: 0
RECTAL PAIN: 0
ABDOMINAL PAIN: 1
ANAL BLEEDING: 0
DIARRHEA: 1
SORE THROAT: 0
CONSTIPATION: 0

## 2021-10-29 NOTE — PROGRESS NOTES
Subjective:      Dhruv Jewell is a50 y.o. female  Chief Complaint   Patient presents with    Follow-up       HPI  PCP: Mehdi Saenz DO  Pt made a f/u appt to discuss results of labs that were performed for evaluation of mild transaminitis. She had a liver US from her PCP in 3/2021 that noted a normal liver and did not want any further liver imaging done. Labs in epic. Largely unrevealing. Full panel of labs fail to reveal any evidence of metabolic, hereditary, or viral hepatitis. MAICOL was positive however this is a very nonspecific marker and patients liver enzymes have not ever been over 80 upon review of records provided to me. 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 Pt is obese with BMI of 35  Transaminitis likely driven by fatty liver component. We discussed everything. She has multiple GI complaints and is scheduled for EGD and colonoscopy by Dr Magaly Wang in a few weeks. Family HX:    Pt denies family hx of colon polyps, colon CA, inflammatory bowel dx, gastric CA and esophageal CA.     Past Medical History:   Diagnosis Date    Hypothyroidism           Past Surgical History:   Procedure Laterality Date    CHOLECYSTECTOMY      PANCREATECTOMY         Social History     Socioeconomic History    Marital status:      Spouse name: None    Number of children: None    Years of education: None    Highest education level: None   Occupational History    None   Tobacco Use    Smoking status: Never Smoker    Smokeless tobacco: Never Used   Vaping Use    Vaping Use: Never used   Substance and Sexual Activity    Alcohol use: Yes     Comment: occasional wine     Drug use: Never    Sexual activity: None   Other Topics Concern    None   Social History Narrative    None     Social Determinants of Health     Financial Resource Strain:     Difficulty of Paying Living Expenses:    Food Insecurity:     Worried About Running Out of Food in the Last Year:     Corinne of Food in the Last Year:    Transportation Needs:     Lack of Transportation (Medical):  Lack of Transportation (Non-Medical):    Physical Activity:     Days of Exercise per Week:     Minutes of Exercise per Session:    Stress:     Feeling of Stress :    Social Connections:     Frequency of Communication with Friends and Family:     Frequency of Social Gatherings with Friends and Family:     Attends Anabaptism Services:     Active Member of Clubs or Organizations:     Attends Club or Organization Meetings:     Marital Status:    Intimate Partner Violence:     Fear of Current or Ex-Partner:     Emotionally Abused:     Physically Abused:     Sexually Abused: Allergies   Allergen Reactions    5-Alpha Reductase Inhibitors     Zetia [Ezetimibe]     Bactrim [Sulfamethoxazole-Trimethoprim] Rash       Current Outpatient Medications   Medication Sig Dispense Refill    Cholecalciferol (VITAMIN D3 GUMMIES ADULT PO) Take 50 mcg by mouth daily       UNABLE TO FIND daily vital proteins collegin peplides       Milk Thistle 1000 MG CAPS Take by mouth daily       UNABLE TO FIND 250 mg daily relora       liothyronine (CYTOMEL) 5 MCG tablet TAKE 1 TABLET BY MOUTH DAILY      Turmeric 1053 MG TABS Take by mouth daily Take half tablet daily      levothyroxine (SYNTHROID) 150 MCG tablet Take by mouth Daily       amphetamine-dextroamphetamine (ADDERALL) 10 MG tablet Take 10 mg by mouth daily as needed. 5 days a week M-F      sertraline (ZOLOFT) 50 MG tablet TAKE 1/2 TABLET BY MOUTH DAILY FOR 7 DAYS, THEN 1 TABLET DAILY  5    PIRMELLA 7/7/7 0.5/0.75/1-35 MG-MCG per tablet daily   11    RABEprazole (ACIPHEX) 20 MG tablet Take by mouth daily        No current facility-administered medications for this visit. Review of Systems   Constitutional: Negative for appetite change, fatigue, fever and unexpected weight change.    HENT: Negative for sore throat, trouble swallowing and voice change. Respiratory: Negative for cough and shortness of breath. Cardiovascular: Negative for chest pain, palpitations and leg swelling. Gastrointestinal: Positive for abdominal pain and diarrhea. Negative for abdominal distention, anal bleeding, blood in stool, constipation, nausea, rectal pain and vomiting. Genitourinary: Negative for hematuria. Musculoskeletal: Positive for back pain and neck pain. Negative for arthralgias. Neurological: Negative for dizziness, weakness, light-headedness and headaches. Psychiatric/Behavioral: Negative for dysphoric mood and sleep disturbance. The patient is not nervous/anxious. All other systems reviewed and are negative. Objective:     Physical Exam  Vitals and nursing note reviewed. Constitutional:       Appearance: She is well-developed. Comments: /74   Pulse 88   Ht 5' 6\" (1.676 m)   Wt 245 lb 12.8 oz (111.5 kg)   SpO2 98%   BMI 39.67 kg/m²    Eyes:      General: No scleral icterus. Conjunctiva/sclera: Conjunctivae normal.      Pupils: Pupils are equal, round, and reactive to light. Cardiovascular:      Rate and Rhythm: Normal rate and regular rhythm. Heart sounds: Normal heart sounds. No murmur heard. No friction rub. No gallop. Pulmonary:      Effort: Pulmonary effort is normal. No respiratory distress. Breath sounds: Normal breath sounds. Abdominal:      General: Bowel sounds are normal. There is no distension. Palpations: Abdomen is soft. Tenderness: There is no abdominal tenderness. There is no rebound. Neurological:      Mental Status: She is alert and oriented to person, place, and time. Cranial Nerves: No cranial nerve deficit. Psychiatric:         Judgment: Judgment normal.           Assessment:       Diagnosis Orders   1. Transaminitis     2. Obesity (BMI 30-39. 9)           Plan:      1.  Would recommend annual liver US and HFP, she wants her PCP to do this.   2. Keep appts for egd/colon that are scheduled with Dr Nik Barrett

## 2021-11-01 RX ORDER — POLYETHYLENE GLYCOL 3350, SODIUM SULFATE, SODIUM CHLORIDE, POTASSIUM CHLORIDE, ASCORBIC ACID, SODIUM ASCORBATE 140-9-5.2G
KIT ORAL
Qty: 1 EACH | Refills: 0 | Status: SHIPPED | OUTPATIENT
Start: 2021-11-01

## 2021-11-03 ENCOUNTER — OFFICE VISIT (OUTPATIENT)
Age: 46
End: 2021-11-03

## 2021-11-03 ENCOUNTER — ANESTHESIA EVENT (OUTPATIENT)
Dept: OPERATING ROOM | Age: 46
End: 2021-11-03

## 2021-11-03 DIAGNOSIS — Z11.59 SCREENING FOR VIRAL DISEASE: Primary | ICD-10-CM

## 2021-11-03 LAB — SARS-COV-2, PCR: NOT DETECTED

## 2021-11-03 PROCEDURE — 99999 PR OFFICE/OUTPT VISIT,PROCEDURE ONLY: CPT | Performed by: NURSE PRACTITIONER

## 2021-11-04 ENCOUNTER — HOSPITAL ENCOUNTER (OUTPATIENT)
Age: 46
Setting detail: SPECIMEN
Discharge: HOME | End: 2021-11-04
Payer: COMMERCIAL

## 2021-11-04 ENCOUNTER — HOSPITAL ENCOUNTER (OUTPATIENT)
Age: 46
Setting detail: OUTPATIENT SURGERY
Discharge: HOME | End: 2021-11-04
Attending: INTERNAL MEDICINE | Admitting: INTERNAL MEDICINE
Payer: COMMERCIAL

## 2021-11-04 ENCOUNTER — ANESTHESIA (OUTPATIENT)
Dept: OPERATING ROOM | Age: 46
End: 2021-11-04

## 2021-11-04 ENCOUNTER — APPOINTMENT (OUTPATIENT)
Dept: OPERATING ROOM | Age: 46
End: 2021-11-04

## 2021-11-04 VITALS
OXYGEN SATURATION: 94 % | RESPIRATION RATE: 1 BRPM | WEIGHT: 245 LBS | DIASTOLIC BLOOD PRESSURE: 96 MMHG | TEMPERATURE: 98 F | BODY MASS INDEX: 39.37 KG/M2 | HEIGHT: 66 IN | SYSTOLIC BLOOD PRESSURE: 183 MMHG | HEART RATE: 82 BPM

## 2021-11-04 VITALS — OXYGEN SATURATION: 97 % | DIASTOLIC BLOOD PRESSURE: 88 MMHG | SYSTOLIC BLOOD PRESSURE: 144 MMHG

## 2021-11-04 PROCEDURE — 45380 COLONOSCOPY AND BIOPSY: CPT

## 2021-11-04 PROCEDURE — 45380 COLONOSCOPY AND BIOPSY: CPT | Performed by: INTERNAL MEDICINE

## 2021-11-04 PROCEDURE — 43239 EGD BIOPSY SINGLE/MULTIPLE: CPT

## 2021-11-04 PROCEDURE — 43239 EGD BIOPSY SINGLE/MULTIPLE: CPT | Performed by: INTERNAL MEDICINE

## 2021-11-04 PROCEDURE — 88305 TISSUE EXAM BY PATHOLOGIST: CPT

## 2021-11-04 PROCEDURE — 88342 IMHCHEM/IMCYTCHM 1ST ANTB: CPT

## 2021-11-04 RX ORDER — ONDANSETRON 2 MG/ML
INJECTION INTRAMUSCULAR; INTRAVENOUS PRN
Status: DISCONTINUED | OUTPATIENT
Start: 2021-11-04 | End: 2021-11-04 | Stop reason: SDUPTHER

## 2021-11-04 RX ORDER — PROPOFOL 10 MG/ML
INJECTION, EMULSION INTRAVENOUS PRN
Status: DISCONTINUED | OUTPATIENT
Start: 2021-11-04 | End: 2021-11-04 | Stop reason: SDUPTHER

## 2021-11-04 RX ORDER — LIDOCAINE HYDROCHLORIDE 10 MG/ML
INJECTION, SOLUTION INFILTRATION; PERINEURAL PRN
Status: DISCONTINUED | OUTPATIENT
Start: 2021-11-04 | End: 2021-11-04 | Stop reason: SDUPTHER

## 2021-11-04 RX ORDER — MIDAZOLAM HYDROCHLORIDE 1 MG/ML
INJECTION INTRAMUSCULAR; INTRAVENOUS PRN
Status: DISCONTINUED | OUTPATIENT
Start: 2021-11-04 | End: 2021-11-04 | Stop reason: SDUPTHER

## 2021-11-04 RX ORDER — SODIUM CHLORIDE 9 MG/ML
INJECTION, SOLUTION INTRAVENOUS CONTINUOUS
Status: DISCONTINUED | OUTPATIENT
Start: 2021-11-04 | End: 2021-11-04 | Stop reason: HOSPADM

## 2021-11-04 RX ORDER — FENTANYL CITRATE 50 UG/ML
INJECTION, SOLUTION INTRAMUSCULAR; INTRAVENOUS PRN
Status: DISCONTINUED | OUTPATIENT
Start: 2021-11-04 | End: 2021-11-04 | Stop reason: SDUPTHER

## 2021-11-04 RX ADMIN — SODIUM CHLORIDE: 9 INJECTION, SOLUTION INTRAVENOUS at 11:34

## 2021-11-04 RX ADMIN — LIDOCAINE HYDROCHLORIDE 40 MG: 10 INJECTION, SOLUTION INFILTRATION; PERINEURAL at 12:06

## 2021-11-04 RX ADMIN — PROPOFOL 200 MG: 10 INJECTION, EMULSION INTRAVENOUS at 12:05

## 2021-11-04 RX ADMIN — MIDAZOLAM HYDROCHLORIDE 2 MG: 1 INJECTION INTRAMUSCULAR; INTRAVENOUS at 12:01

## 2021-11-04 RX ADMIN — ONDANSETRON 4 MG: 2 INJECTION INTRAMUSCULAR; INTRAVENOUS at 12:04

## 2021-11-04 RX ADMIN — FENTANYL CITRATE 100 MCG: 50 INJECTION, SOLUTION INTRAMUSCULAR; INTRAVENOUS at 12:01

## 2021-11-04 NOTE — H&P
Provider, MD   Milk Thistle 1000 MG CAPS Take by mouth daily     Historical Provider, MD   UNABLE TO FIND 250 mg daily relora     Historical Provider, MD   Turmeric 1053 MG TABS Take by mouth daily Take half tablet daily    Historical Provider, MD   amphetamine-dextroamphetamine (ADDERALL) 10 MG tablet Take 10 mg by mouth daily as needed. 5 days a week M-F    Historical Provider, MD   sertraline (ZOLOFT) 50 MG tablet TAKE 1/2 TABLET BY MOUTH DAILY FOR 7 DAYS, THEN 1 TABLET DAILY 7/2/19   Historical Provider, MD   [facility] 7/7/7 0.5/0.75/1-35 MG-MCG per tablet daily  7/2/19   Historical Provider, MD   RABEprazole (ACIPHEX) 20 MG tablet Take by mouth daily     Historical Provider, MD       Past Medical History:  Past Medical History:   Diagnosis Date    Hypothyroidism        Past Surgical History:  Past Surgical History:   Procedure Laterality Date    CHOLECYSTECTOMY      PANCREATECTOMY         Social History:  Social History     Tobacco Use    Smoking status: Never Smoker    Smokeless tobacco: Never Used   Vaping Use    Vaping Use: Never used   Substance Use Topics    Alcohol use: Yes     Comment: occasional wine     Drug use: Never       Vital Signs:   Vitals:    11/04/21 1119   BP: (!) 168/98   Pulse: 84   Resp: 12   Temp: 98 °F (36.7 °C)   SpO2: 97%        Physical Exam:  Cardiac:  [x]WNL  []Comments:  Pulmonary:  [x]WNL   []Comments:  Neuro/Mental Status:  [x]WNL  []Comments:  Abdominal:  [x]WNL    []Comments:  Other:   []WNL  []Comments:    Informed Consent:  The risks and benefits of the procedure have been discussed with either the patient or if they cannot consent, their representative. Assessment:  Patient examined and appropriate for planned sedation and procedure. Plan:  Proceed with planned sedation and procedure as above.      Kvng Morse am scribing for and in the presence of Dr. Anahi Stein MD.  Electronically signed by Jose Guadalupe Meza RN on 11/4/2021 at 11:22 AM    I personally performed the services described in this documentation as scribed by Megan Lezama, and it appears accurate and complete.      Kai Fu MD  11/4/2021

## 2021-11-04 NOTE — ANESTHESIA POSTPROCEDURE EVALUATION
Department of Anesthesiology  Postprocedure Note    Patient: Korin Joseph  MRN: [id number]  YOB: 1975  Date of evaluation: 11/4/2021  Time:  12:19 PM     Procedure Summary     Date: 11/04/21 Room / Location: [facility] ENDO 02 / [address] [place] [address]    Anesthesia Start: 1157 Anesthesia Stop:     Procedures:       EGD BIOPSY (N/A Esophagus)      COLONOSCOPY WITH BIOPSY (N/A Abdomen) Diagnosis: (ABD PAIN, DIARRHEA, HB)    Surgeons: Ericka Gonzalez MD Responsible Provider: ANDREW Castillo CRNA    Anesthesia Type: general, TIVA ASA Status: 3          Anesthesia Type: No value filed. Leida Phase I:      Leida Phase II:      Last vitals: Reviewed and per EMR flowsheets.        Anesthesia Post Evaluation    Patient location during evaluation: bedside  Patient participation: complete - patient participated  Level of consciousness: sleepy but conscious  Pain score: 0  Airway patency: patent  Nausea & Vomiting: no nausea and no vomiting  Complications: no  Cardiovascular status: hemodynamically stable and blood pressure returned to baseline  Respiratory status: acceptable and nasal cannula  Hydration status: stable

## 2021-11-04 NOTE — ANESTHESIA PRE PROCEDURE
Department of Anesthesiology  Preprocedure Note       Name:  Deshaun Luo   Age:  48 y.o.  :  1975                                          MRN:  [id number]         Date:  2021      Surgeon: Angie Query):  David Pearl MD    Procedure: Procedure(s):  EGD BIOPSY  COLONOSCOPY DIAGNOSTIC    Medications prior to admission:   Prior to Admission medications    Medication Sig Start Date End Date Taking? Authorizing Provider   liothyronine (CYTOMEL) 5 MCG tablet TAKE 1 TABLET BY MOUTH DAILY 21  Yes Historical Provider, MD   levothyroxine (SYNTHROID) 150 MCG tablet Take by mouth Daily    Yes Historical Provider, MD   PEG-KCl-NaCl-NaSulf-Na Asc-C (PLENVU) 140 g SOLR PT HAS INSTRUCTIONS 21   David Pearl MD   Cholecalciferol (VITAMIN D3 GUMMIES ADULT PO) Take 50 mcg by mouth daily     Historical Provider, MD   UNABLE TO FIND daily vital proteins collegin peplides     Historical Provider, MD   Milk Thistle 1000 MG CAPS Take by mouth daily     Historical Provider, MD   UNABLE TO FIND 250 mg daily relora     Historical Provider, MD   Turmeric 1053 MG TABS Take by mouth daily Take half tablet daily    Historical Provider, MD   amphetamine-dextroamphetamine (ADDERALL) 10 MG tablet Take 10 mg by mouth daily as needed. 5 days a week M-F    Historical Provider, MD   sertraline (ZOLOFT) 50 MG tablet TAKE 1/2 TABLET BY MOUTH DAILY FOR 7 DAYS, THEN 1 TABLET DAILY 19   Historical Provider, MD   [facility]  0.5/0.75/1-35 MG-MCG per tablet daily  19   Historical Provider, MD   RABEprazole (ACIPHEX) 20 MG tablet Take by mouth daily     Historical Provider, MD       Current medications:    Current Facility-Administered Medications   Medication Dose Route Frequency Provider Last Rate Last Admin    0.9 % sodium chloride infusion   IntraVENous Continuous David Pearl MD           Allergies:     Allergies   Allergen Reactions    5-Alpha Reductase Inhibitors     Zetia [Ezetimibe]     Bactrim [Sulfamethoxazole-Trimethoprim] Rash       Problem List:    Patient Active Problem List   Diagnosis Code    Transaminitis R74.01    Generalized abdominal pain R10.84    Pencilling of stools R19.5    Chronic heartburn R12    Diarrhea R19.7    Obesity (BMI 30-39. 9) E66.9       Past Medical History:        Diagnosis Date    Hypothyroidism        Past Surgical History:        Procedure Laterality Date    CHOLECYSTECTOMY      PANCREATECTOMY         Social History:    Social History     Tobacco Use    Smoking status: Never Smoker    Smokeless tobacco: Never Used   Substance Use Topics    Alcohol use: Yes     Comment: occasional wine                                 Counseling given: Not Answered      Vital Signs (Current):   Vitals:    11/04/21 1119   BP: (!) 168/98   Pulse: 84   Resp: 12   Temp: 98 °F (36.7 °C)   TempSrc: Temporal   SpO2: 97%   Weight: 245 lb (111.1 kg)   Height: 5' 6\" (1.676 m)                                              BP Readings from Last 3 Encounters:   11/04/21 (!) 168/98   10/29/21 132/74   10/01/21 139/80       NPO Status: Time of last liquid consumption: 0800                        Time of last solid consumption: 1800                        Date of last liquid consumption: 11/04/21                        Date of last solid food consumption: 11/02/21    BMI:   Wt Readings from Last 3 Encounters:   11/04/21 245 lb (111.1 kg)   10/29/21 245 lb 12.8 oz (111.5 kg)   10/01/21 240 lb (108.9 kg)     Body mass index is 39.54 kg/m².     CBC:   Lab Results   Component Value Date    WBC 11.3 10/01/2021    RBC 5.15 10/01/2021    HGB 14.9 10/01/2021    HCT 46.6 10/01/2021    MCV 90.5 10/01/2021    RDW 13.2 10/01/2021     10/01/2021       CMP:   Lab Results   Component Value Date     10/01/2021    K 3.8 10/01/2021     10/01/2021    CO2 27 10/01/2021    BUN 11 10/01/2021    CREATININE 0.7 10/01/2021    GFRAA >59 10/01/2021    LABGLOM >60 10/01/2021    GLUCOSE 80 10/01/2021    PROT 8.3 10/01/2021    CALCIUM 9.8 10/01/2021    BILITOT 0.3 10/01/2021    ALKPHOS 103 10/01/2021    AST 62 10/01/2021    ALT 79 10/01/2021       POC Tests: No results for input(s): POCGLU, POCNA, POCK, POCCL, POCBUN, POCHEMO, POCHCT in the last 72 hours. Coags:   Lab Results   Component Value Date    PROTIME 13.2 10/01/2021    INR 0.98 10/01/2021       HCG (If Applicable): No results found for: PREGTESTUR, PREGSERUM, HCG, HCGQUANT     ABGs: No results found for: PHART, PO2ART, ALM3BXM, KUU9DAK, BEART, D7GNOTPA     Type & Screen (If Applicable):  No results found for: LABABO, LABRH    Drug/Infectious Status (If Applicable):  No results found for: HIV, HEPCAB    COVID-19 Screening (If Applicable):   Lab Results   Component Value Date    COVID19 Not Detected 11/03/2021           Anesthesia Evaluation  Patient summary reviewed no history of anesthetic complications:   Airway: Mallampati: II  TM distance: >3 FB   Neck ROM: full  Mouth opening: < 3 FB Dental: normal exam         Pulmonary:Negative Pulmonary ROS and normal exam                               Cardiovascular:             Beta Blocker:  Not on Beta Blocker         Neuro/Psych:                ROS comment: CNS stimulant GI/Hepatic/Renal:   (+) bowel prep, morbid obesity (BMI 40)         ROS comment: etoh use per chart. Endo/Other:    (+) hypothyroidism::., .                 Abdominal:             Vascular: negative vascular ROS. Other Findings:             Anesthesia Plan      general and TIVA     ASA 3       Induction: intravenous. Anesthetic plan and risks discussed with patient.                       ANDREW Edwards CRNA   11/4/2021

## 2021-11-04 NOTE — OP NOTE
Patient: Ingrid Whitfield : 1975  [facility] Rec#: [id number] Acc#: [id number]   Primary Care Provider eJnnifer Aguilar DO    Date of Procedure:  2021    Endoscopist: Jovany Marinelli MD    Referring Provider: Jennifer Aguilar DO, Dot Sinning, APRN    Operation Performed: Colonoscopy with biopsy    Indications: change in bowel habits, diarrhea     Anesthesia:  Sedation was administered by anesthesia who monitored the patient during the procedure. I met with Ingrid Whitfield prior to procedure. We discussed the procedure itself, and I have discussed the risks of endoscopy (including-- but not limited to-- pain, discomfort, bleeding potentially requiring second endoscopic procedure and/or blood transfusion, organ perforation requiring operative repair, damage to organs near the colon, infection, aspiration, cardiopulmonary/allergic reaction), benefits, indications to endoscopy. Additionally, we discussed options other than colonoscopy. The patient expressed understanding. All questions answered. The patient decided to proceed with the procedure. Signed informed consent was placed on the chart. Blood Loss: minimal    Withdrawal time: n/a  Bowel Prep: adequate     Complications: no immediate complications    DESCRIPTION OF PROCEDURE:     A time out was performed. After written informed consent was obtained, the patient was placed in the left lateral position. The perianal area was inspected, and a digital rectal exam was performed. A rectal exam was performed: normal tone, no palpable lesions. At this point, a forward viewing Olympus colonoscope was inserted into the anus and carefully advanced to the terminal ileum. The cecum was identified by the ileocecal valve and the appendiceal orifice. The colonoscope was then slowly withdrawn with careful inspection of the mucosa in a linear and circumferential fashion. The scope was retroflexed in the rectum.  Suction was utilized during the procedure to remove as much air as possible from the bowel. The colonoscope was removed from the patient, and the procedure was terminated. Findings are listed below. Findings: The mucosa appeared normal throughout the entire examined colon. Random colon biopsies obtained for diarrhea. Retroflexion in the rectum was normal and revealed no further abnormalities. Recommendations:  1. Repeat colonoscopy: pending pathology - 10 years, for CRC screening  2. Await biopsy results-you will receive a letter with your results within 7-10 days. Findings and recommendations were discussed w/ the patient. A copy of the images was provided. Filiberto Aldana am scribing for and in the presence of Dr. Alex Farrar MD.  Electronically signed by Fredo Bueno RN on 11/4/2021 at 11:29 AM    I personally performed the services described in this documentation as scribed by Rita Du, and it appears accurate and complete.      Alex Farrar MD  11/4/2021

## 2021-11-04 NOTE — OP NOTE
Endoscopic Procedure Note    Patient: Bhavana Coelho : 1975  [facility] Rec#: [id number] Acc#: [id number]     Primary Care Provider Ty Ram DO  Referring Provider: ANDREW Ramirez    Endoscopist: Kiana Verdugo MD    Date of Procedure:  2021    Procedure:   1. EGD with biopsy    Indications:   1. Abdominal pain  2. Diarrhea     Anesthesia:  Sedation was administered by anesthesia who monitored the patient during the procedure. Estimated Blood Loss: minimal    Procedure:   After reviewing the patient's chart and obtaining informed consent, the patient was placed in the left lateral decubitus position. A forward-viewing Olympus endoscope was lubricated and inserted through the mouth into the oropharynx. Under direct visualization, the upper esophagus was intubated. The scope was advanced to the level of the third portion of duodenum. Scope was slowly withdrawn with careful inspection of the mucosal surfaces. The scope was retroflexed for inspection of the gastric fundus and incisura. Findings and maneuvers are listed in impression below. The patient tolerated the procedure well. The scope was removed. There were no immediate complications. Findings:   Esophagus: normal    There is no hiatal hernia present. Stomach:  abnormal: Mild mucosal changes suggestive of gastritis noted -  Gastric biopsies were taken from the antrum and body to rule out Helicobacter pylori infection. Duodenum: normal: biopsies obtained for diarrhea      IMPRESSION:  1. S/p duodenal and gastric biopsies obtained as above     RECOMMENDATIONS:    1. Await path results, the patient will be contacted in 7-10 days with biopsy results. 2.  Continue current GI meds  3. Trial of FDgard (OTC)  4. Follow up in office with ANDREW Ramirez in 6-8 weeks. The results were discussed with the patient and family. A copy of the images obtained were given to the patient.      cecil Pabon for and in the presence of Dr. Diandra Cardona MD.  Electronically signed by Michelle Degroot RN on 11/4/2021 at 11:28 AM    I personally performed the services described in this documentation as scribed by Adriana Montoya, and it appears accurate and complete.      Vivien Hassan MD  11/4/2021

## 2022-03-08 ENCOUNTER — TRANSCRIBE ORDERS (OUTPATIENT)
Dept: ADMINISTRATIVE | Facility: HOSPITAL | Age: 47
End: 2022-03-08

## 2022-03-08 DIAGNOSIS — Z12.31 ENCOUNTER FOR SCREENING MAMMOGRAM FOR MALIGNANT NEOPLASM OF BREAST: Primary | ICD-10-CM

## 2022-03-18 ENCOUNTER — APPOINTMENT (OUTPATIENT)
Dept: MAMMOGRAPHY | Facility: HOSPITAL | Age: 47
End: 2022-03-18

## 2022-03-25 ENCOUNTER — HOSPITAL ENCOUNTER (OUTPATIENT)
Dept: MAMMOGRAPHY | Facility: HOSPITAL | Age: 47
Discharge: HOME | End: 2022-03-25
Admitting: FAMILY MEDICINE

## 2022-03-25 DIAGNOSIS — Z12.31 ENCOUNTER FOR SCREENING MAMMOGRAM FOR MALIGNANT NEOPLASM OF BREAST: ICD-10-CM

## 2022-03-25 PROCEDURE — 77063 BREAST TOMOSYNTHESIS BI: CPT

## 2022-03-25 PROCEDURE — 77067 SCR MAMMO BI INCL CAD: CPT

## 2022-05-31 ENCOUNTER — TRANSCRIBE ORDERS (OUTPATIENT)
Dept: ADMINISTRATIVE | Facility: HOSPITAL | Age: 47
End: 2022-05-31

## 2022-05-31 ENCOUNTER — HOSPITAL ENCOUNTER (OUTPATIENT)
Dept: CARDIOLOGY | Facility: HOSPITAL | Age: 47
Discharge: HOME | End: 2022-05-31
Admitting: FAMILY MEDICINE

## 2022-05-31 DIAGNOSIS — I16.0 HYPERTENSIVE URGENCY: ICD-10-CM

## 2022-05-31 DIAGNOSIS — R00.0 TACHYCARDIA, UNSPECIFIED: Primary | ICD-10-CM

## 2022-05-31 DIAGNOSIS — R94.31 NONSPECIFIC ABNORMAL ELECTROCARDIOGRAM (ECG) (EKG): ICD-10-CM

## 2022-05-31 DIAGNOSIS — R00.0 TACHYCARDIA, UNSPECIFIED: ICD-10-CM

## 2022-05-31 PROCEDURE — 93005 ELECTROCARDIOGRAM TRACING: CPT | Performed by: FAMILY MEDICINE

## 2022-05-31 PROCEDURE — 93010 ELECTROCARDIOGRAM REPORT: CPT | Performed by: INTERNAL MEDICINE

## 2022-06-01 LAB
QT INTERVAL: 362 MS
QTC INTERVAL: 425 MS

## 2022-06-02 ENCOUNTER — HOSPITAL ENCOUNTER (OUTPATIENT)
Dept: ULTRASOUND IMAGING | Facility: HOSPITAL | Age: 47
Discharge: HOME | End: 2022-06-02

## 2022-06-02 ENCOUNTER — HOSPITAL ENCOUNTER (OUTPATIENT)
Dept: CARDIOLOGY | Facility: HOSPITAL | Age: 47
Discharge: HOME | End: 2022-06-02

## 2022-06-02 VITALS
BODY MASS INDEX: 41.46 KG/M2 | HEART RATE: 77 BPM | HEIGHT: 66 IN | SYSTOLIC BLOOD PRESSURE: 143 MMHG | WEIGHT: 258 LBS | DIASTOLIC BLOOD PRESSURE: 89 MMHG

## 2022-06-02 DIAGNOSIS — I16.0 HYPERTENSIVE URGENCY: ICD-10-CM

## 2022-06-02 DIAGNOSIS — R94.31 NONSPECIFIC ABNORMAL ELECTROCARDIOGRAM (ECG) (EKG): ICD-10-CM

## 2022-06-02 LAB
BH CV STRESS BP STAGE 1: NORMAL
BH CV STRESS BP STAGE 2: NORMAL
BH CV STRESS DURATION MIN STAGE 1: 3
BH CV STRESS DURATION MIN STAGE 2: 2
BH CV STRESS DURATION SEC STAGE 1: 0
BH CV STRESS DURATION SEC STAGE 2: 23
BH CV STRESS GRADE STAGE 1: 10
BH CV STRESS GRADE STAGE 2: 12
BH CV STRESS HR STAGE 1: 126
BH CV STRESS HR STAGE 2: 136
BH CV STRESS METS STAGE 1: 5
BH CV STRESS METS STAGE 2: 7.5
BH CV STRESS PROTOCOL 1: NORMAL
BH CV STRESS RECOVERY BP: NORMAL MMHG
BH CV STRESS RECOVERY HR: 88 BPM
BH CV STRESS SPEED STAGE 1: 1.7
BH CV STRESS SPEED STAGE 2: 2.5
BH CV STRESS STAGE 1: 1
BH CV STRESS STAGE 2: 2
MAXIMAL PREDICTED HEART RATE: 171 BPM
PERCENT MAX PREDICTED HR: 79.53 %
STRESS BASELINE BP: NORMAL MMHG
STRESS BASELINE HR: 76 BPM
STRESS PERCENT HR: 94 %
STRESS POST ESTIMATED WORKLOAD: 7.5 METS
STRESS POST EXERCISE DUR MIN: 5 MIN
STRESS POST EXERCISE DUR SEC: 23 SEC
STRESS POST PEAK BP: NORMAL MMHG
STRESS POST PEAK HR: 136 BPM
STRESS TARGET HR: 145 BPM

## 2022-06-02 PROCEDURE — 93975 VASCULAR STUDY: CPT

## 2022-06-02 PROCEDURE — 93017 CV STRESS TEST TRACING ONLY: CPT

## 2022-06-02 PROCEDURE — 76775 US EXAM ABDO BACK WALL LIM: CPT

## 2022-06-02 PROCEDURE — 93018 CV STRESS TEST I&R ONLY: CPT | Performed by: INTERNAL MEDICINE

## 2022-06-02 RX ORDER — METOPROLOL SUCCINATE 100 MG/1
100 TABLET, EXTENDED RELEASE ORAL DAILY
COMMUNITY
End: 2022-10-27 | Stop reason: SDUPTHER

## 2022-06-02 RX ORDER — CLONIDINE 0.1 MG/24H
1 PATCH, EXTENDED RELEASE TRANSDERMAL WEEKLY
COMMUNITY
End: 2022-06-10 | Stop reason: ALTCHOICE

## 2022-06-02 RX ORDER — SERTRALINE HYDROCHLORIDE 25 MG/1
25 TABLET, FILM COATED ORAL DAILY
COMMUNITY

## 2022-06-02 RX ORDER — AMLODIPINE BESYLATE 5 MG/1
5 TABLET ORAL DAILY
COMMUNITY
End: 2022-12-02

## 2022-06-10 ENCOUNTER — OFFICE VISIT (OUTPATIENT)
Dept: CARDIOLOGY | Facility: CLINIC | Age: 47
End: 2022-06-10

## 2022-06-10 VITALS
HEART RATE: 73 BPM | HEIGHT: 66 IN | OXYGEN SATURATION: 96 % | BODY MASS INDEX: 40.18 KG/M2 | SYSTOLIC BLOOD PRESSURE: 166 MMHG | WEIGHT: 250 LBS | DIASTOLIC BLOOD PRESSURE: 120 MMHG

## 2022-06-10 DIAGNOSIS — Z82.49 FAMILY HISTORY OF EARLY CAD: ICD-10-CM

## 2022-06-10 DIAGNOSIS — R29.818 SUSPECTED SLEEP APNEA: ICD-10-CM

## 2022-06-10 DIAGNOSIS — R06.09 DOE (DYSPNEA ON EXERTION): ICD-10-CM

## 2022-06-10 DIAGNOSIS — E66.01 MORBID OBESITY WITH BMI OF 40.0-44.9, ADULT: ICD-10-CM

## 2022-06-10 DIAGNOSIS — R07.9 CHEST PAIN IN ADULT: ICD-10-CM

## 2022-06-10 DIAGNOSIS — I10 PRIMARY HYPERTENSION: Primary | ICD-10-CM

## 2022-06-10 PROBLEM — K35.80 ACUTE APPENDICITIS: Status: RESOLVED | Noted: 2018-05-13 | Resolved: 2022-06-10

## 2022-06-10 PROCEDURE — 99204 OFFICE O/P NEW MOD 45 MIN: CPT | Performed by: INTERNAL MEDICINE

## 2022-06-10 RX ORDER — LIOTHYRONINE SODIUM 5 UG/1
5 TABLET ORAL DAILY
COMMUNITY
Start: 2022-05-31

## 2022-06-10 RX ORDER — CLONIDINE HYDROCHLORIDE 0.1 MG/1
TABLET ORAL AS NEEDED
COMMUNITY
Start: 2022-05-31

## 2022-06-10 RX ORDER — LEVOTHYROXINE SODIUM 150 MCG
150 TABLET ORAL DAILY
COMMUNITY
Start: 2022-06-06

## 2022-06-10 RX ORDER — ASPIRIN 81 MG/1
81 TABLET ORAL DAILY
COMMUNITY

## 2022-06-10 RX ORDER — LOSARTAN POTASSIUM 25 MG/1
25 TABLET ORAL DAILY
Qty: 90 TABLET | Refills: 3 | Status: SHIPPED | OUTPATIENT
Start: 2022-06-10 | End: 2022-09-09 | Stop reason: SDUPTHER

## 2022-06-10 RX ORDER — HYDROCHLOROTHIAZIDE 12.5 MG/1
12.5 CAPSULE, GELATIN COATED ORAL DAILY
Qty: 90 CAPSULE | Refills: 3 | Status: SHIPPED | OUTPATIENT
Start: 2022-06-10

## 2022-06-10 NOTE — PROGRESS NOTES
Aliza Garcia  [id number]  1975  47 y.o.  female    Referring Provider: Aliza Panda DO    Reason for  Visit:  Initial visit for chest pain and shortness of breath   Elevated BP     Subjective     Chest pain both with exertion as well as at rest.  Feels episodes of chest pain occur more often with exertion  Moderate substernal,   Pressure like   Chest pain non pleuritic  Chest pain non positional and non gustatory   Lasts less than 5 minutes    Started more than 1-2 months ago  Occurs once or twice a week on the average but can be variable in frequency  No associated diaphoresis    No associated nausea  No radiation    Relieved with rest or spontaneously  Not positional    No change with intake of food or antacids  No change with breathing  Mild to moderate associated dyspnea    No similar chest pain episodes in the past     Has moderate snoring with daytime fatigue     BP elevated at home       History of present illness:  Aliza Garcia is a 47 y.o. yo female with essential hypertension   who presents today for   Chief Complaint   Patient presents with   • Chest Pain     Patient states she has a heavy tightness in her chest with exertion. Takes her breath away. Patient has been having a lot of headaches lately and very tired.    .    History  Past Medical History:   Diagnosis Date   • Acid reflux    • Disease of thyroid gland    • Primary hypertension 6/10/2022   ,   Past Surgical History:   Procedure Laterality Date   • APPENDECTOMY N/A 05/14/2018    Procedure: APPENDECTOMY LAPAROSCOPIC;  Surgeon: Ange Dominguez MD;  Location: [facility];  Service: General   • CHOLECYSTECTOMY     ,   Family History   Problem Relation Age of Onset   • Hypertension Mother    • Diabetes Mother    • Hypothyroidism Mother    • Heart disease Father    • Hypertension Father    • Diabetes Father    • Atrial fibrillation Father    • No Known Problems Sister    • No Known Problems Brother    • No Known Problems  Maternal Aunt    • No Known Problems Paternal Aunt    • No Known Problems Maternal Grandmother    • Cancer Maternal Grandfather    • No Known Problems Paternal Grandmother    • Cancer Paternal Grandfather    • No Known Problems Daughter    • No Known Problems Son    • No Known Problems Other    • Breast cancer Neg Hx    • BRCA 1/2 Neg Hx    • Colon cancer Neg Hx    • Endometrial cancer Neg Hx    • Ovarian cancer Neg Hx    ,   Social History     Tobacco Use   • Smoking status: Never Smoker   • Smokeless tobacco: Never Used   Vaping Use   • Vaping Use: Never used   Substance Use Topics   • Alcohol use: Yes     Comment: occasional only   • Drug use: No   ,     Medications  Current Outpatient Medications   Medication Sig Dispense Refill   • amLODIPine (NORVASC) 5 MG tablet Take 5 mg by mouth Daily.     • amphetamine-dextroamphetamine (ADDERALL) 10 MG tablet Take 10 mg by mouth Daily.     • aspirin 81 MG EC tablet Take 81 mg by mouth Daily.     • cloNIDine (CATAPRES) 0.1 MG tablet      • liothyronine (CYTOMEL) 5 MCG tablet Take 5 mcg by mouth Daily.     • metoprolol succinate XL (TOPROL-XL) 50 MG 24 hr tablet Take 50 mg by mouth Daily.     • Milk Thistle 1000 MG capsule Take  by mouth.     • RABEprazole Sodium (ACIPHEX PO) Take  by mouth Every Night.     • sertraline (ZOLOFT) 25 MG tablet Take 25 mg by mouth Daily.     • Synthroid 150 MCG tablet Take 150 mcg by mouth Daily.     • Turmeric 1053 MG tablet Take  by mouth.     • hydroCHLOROthiazide (MICROZIDE) 12.5 MG capsule Take 1 capsule by mouth Daily. 90 capsule 3   • HYDROcodone-acetaminophen (NORCO) 5-325 MG per tablet Take 1-2 tablets by mouth Every 4 (Four) Hours As Needed (Pain). 30 tablet 0   • losartan (Cozaar) 25 MG tablet Take 1 tablet by mouth Daily. 90 tablet 3   • norethindrone-ethinyl estradiol (ORTHO-NOVUM 1-35 TAB,NORTREL 1-35 TAB) 1-35 MG-MCG per tablet Take 1 tablet by mouth Daily.     • norethindrone-ethinyl estradiol 0.5/0.75/1-35 MG-MCG per tablet  "Take 1 tablet by mouth Daily.       No current facility-administered medications for this visit.       Allergies:  Bactrim [sulfamethoxazole-trimethoprim]    Review of Systems  Review of Systems   Constitutional: Negative.   HENT: Negative.    Eyes: Negative.    Cardiovascular: Positive for chest pain and dyspnea on exertion. Negative for claudication, cyanosis, irregular heartbeat, leg swelling, near-syncope, orthopnea, palpitations, paroxysmal nocturnal dyspnea and syncope.   Respiratory: Negative.    Endocrine: Negative.    Hematologic/Lymphatic: Negative.    Skin: Negative.    Gastrointestinal: Negative for anorexia.   Genitourinary: Negative.    Neurological: Negative.    Psychiatric/Behavioral: Negative.        Objective     Physical Exam:  BP (!) 166/120   Pulse 73   Ht 167.6 cm (66\")   Wt 113 kg (250 lb)   SpO2 96%   BMI 40.35 kg/m²     Physical Exam  Constitutional:       Appearance: Normal appearance.   HENT:      Head: Normocephalic.   Eyes:      General: Lids are normal.   Neck:      Vascular: No carotid bruit.   Cardiovascular:      Rate and Rhythm: Regular rhythm.      Heart sounds: Normal heart sounds, S1 normal and S2 normal.    No systolic murmur is present.  Pulmonary:      Effort: Pulmonary effort is normal.   Abdominal:      Palpations: Abdomen is soft.   Neurological:      Mental Status: She is alert.   Psychiatric:         Speech: Speech normal.         Behavior: Behavior normal.         Thought Content: Thought content normal.         Results Review:    AORTA:   Peak systolic velocity of 93.83 cm/s with a normal waveform.     OTHER FINDINGS: Urinary bladder appears unremarkable.     IMPRESSION:  1. Normal grayscale appearance of bilateral kidneys.   2. No Doppler evidence of renal artery stenosis.     This report was finalized on 06/02/2022 14:57 by Dr. Bennett Diaz MD.    Aliza CHINO Story  Stress test only, exercise  Order# [id number]  Reading physician: John Diaz MD " Ordering physician: Aliza Panda DO Study date: 22       Patient Information    Patient Name   Aliza Garcia MRN   [id number] Legal Sex   Female  (Age)   1975 (47 y.o.)         Interpretation Summary       · The patient reported chest pain and shortness of breath during the stress test.     EQUIVOCAL RISK FOR ISCHEMIA - CONSIDER REPEAT STUDY WITH IMAGING        ____________________________________________________________________________________________________________________________________________  Health maintenance and recommendations    Low salt/ HTN/ Heart healthy carbohydrate restricted cardiac diet   The patient is advised to reduce or avoid caffeine or other cardiac stimulants.   Minimize or avoid  NSAID-type medications      Monitor for any signs of bleeding including red or dark stools. Fall precautions.   Advised staying uptodate with immunizations per established standard guidelines.    Offered to give patient  a copy of my notes     Questions were encouraged, asked and answered to the patient's  understanding and satisfaction. Questions if any regarding current medications and side effects, need for refills and importance of compliance to medications stressed.    Reviewed available prior notes, consults, prior visits, laboratory findings, radiology and cardiology relevant reports. Updated chart as applicable. I have reviewed the patient's medical history in detail and updated the computerized patient record as relevant.      Updated patient regarding any new or relevant abnormalities on review of records or any new findings on physical exam. Mentioned to patient about purpose of visit and desirable health short and long term goals and objectives.    Primary to monitor CBC CMP Lipid panel and TSH as applicable    ___________________________________________________________________________________________________________________________________________   Procedures    Assessment &  Plan   Diagnoses and all orders for this visit:    1. Primary hypertension (Primary)    2. Morbid obesity with BMI of 40.0-44.9, adult (HCC)    3. Suspected sleep apnea  -     Ambulatory Referral to Sleep Medicine    4. HERNANDEZ (dyspnea on exertion)  -     Adult Transthoracic Echo Complete w/ Color, Spectral and Contrast if necessary per protocol; Future    5. Chest pain in adult  -     CT Angiogram Coronary; Future    6. Family history of early CAD    Other orders  -     losartan (Cozaar) 25 MG tablet; Take 1 tablet by mouth Daily.  Dispense: 90 tablet; Refill: 3  -     hydroCHLOROthiazide (MICROZIDE) 12.5 MG capsule; Take 1 capsule by mouth Daily.  Dispense: 90 capsule; Refill: 3          Plan    Needs better BP control    Requested Prescriptions     Signed Prescriptions Disp Refills   • losartan (Cozaar) 25 MG tablet 90 tablet 3     Sig: Take 1 tablet by mouth Daily.   • hydroCHLOROthiazide (MICROZIDE) 12.5 MG capsule 90 capsule 3     Sig: Take 1 capsule by mouth Daily.      Check BP and heart rates twice daily initially till blood pressures and heart rates under good control and then at least 3x / week,   If blood pressures continue to be well-controlled then can check week a month  at home and bring a recording for review next visit  If BP >130/85 or < 100/60 persistently over 3 reading 30 mins apart or if heart rates persistently above 100 bpm or less than 55 bpm call sooner for evaluation and advise     Can take PO clonidine 0.1 mg BID prn for BP > 150/90 mm Hg     Orders Placed This Encounter   Procedures   • CT Angiogram Coronary     Standing Status:   Future     Standing Expiration Date:   6/10/2023     Order Specific Question:   Patient Pregnant     Answer:   No     Order Specific Question:   Release to patient     Answer:   Immediate   • Ambulatory Referral to Sleep Medicine     Referral Priority:   Routine     Number of Visits Requested:   1   • Adult Transthoracic Echo Complete w/ Color, Spectral and  Contrast if necessary per protocol     Standing Status:   Future     Standing Expiration Date:   6/10/2023     Scheduling Instructions:      Myocardial strain to be performed during echocardiogram as long as technically feasible     Order Specific Question:   Reason for exam?     Answer:   Dyspnea     Order Specific Question:   Reason for exam?     Answer:   Heart Failure, Cardiomyopathy, or Sytemic or Pulmonary Hypertension     Order Specific Question:   Reason for exam?     Answer:   Chest Pain     Order Specific Question:   Release to patient     Answer:   Immediate        Follow up with Esther RAMSAY , Giorgi RAMSAY, Gavi Gonzalez PA-C  or myself              Return in about 6 weeks (around 7/22/2022).

## 2022-06-17 ENCOUNTER — HOSPITAL ENCOUNTER (OUTPATIENT)
Dept: CT IMAGING | Facility: HOSPITAL | Age: 47
Discharge: HOME | End: 2022-06-17
Admitting: INTERNAL MEDICINE

## 2022-06-17 VITALS
SYSTOLIC BLOOD PRESSURE: 101 MMHG | BODY MASS INDEX: 35.02 KG/M2 | RESPIRATION RATE: 16 BRPM | DIASTOLIC BLOOD PRESSURE: 61 MMHG | TEMPERATURE: 96.7 F | WEIGHT: 217.9 LBS | OXYGEN SATURATION: 98 % | HEART RATE: 62 BPM | HEIGHT: 66 IN

## 2022-06-17 DIAGNOSIS — R07.9 CHEST PAIN IN ADULT: ICD-10-CM

## 2022-06-17 DIAGNOSIS — R07.9 CHEST PAIN IN ADULT: Primary | ICD-10-CM

## 2022-06-17 LAB
B-HCG UR QL: NEGATIVE
CREAT SERPL-MCNC: 0.69 MG/DL (ref 0.57–1)
EGFRCR SERPLBLD CKD-EPI 2021: 106.5 ML/MIN/1.73

## 2022-06-17 PROCEDURE — 0 IOPAMIDOL PER 1 ML: Performed by: INTERNAL MEDICINE

## 2022-06-17 PROCEDURE — 82565 ASSAY OF CREATININE: CPT | Performed by: INTERNAL MEDICINE

## 2022-06-17 PROCEDURE — 75574 CT ANGIO HRT W/3D IMAGE: CPT | Performed by: INTERNAL MEDICINE

## 2022-06-17 PROCEDURE — 81025 URINE PREGNANCY TEST: CPT | Performed by: INTERNAL MEDICINE

## 2022-06-17 PROCEDURE — 75574 CT ANGIO HRT W/3D IMAGE: CPT

## 2022-06-17 RX ORDER — METOPROLOL TARTRATE 50 MG/1
50 TABLET, FILM COATED ORAL ONCE AS NEEDED
Status: DISCONTINUED | OUTPATIENT
Start: 2022-06-17 | End: 2022-06-18 | Stop reason: HOSPADM

## 2022-06-17 RX ORDER — NITROGLYCERIN 0.4 MG/1
TABLET SUBLINGUAL
Status: COMPLETED | OUTPATIENT
Start: 2022-06-17 | End: 2022-06-17

## 2022-06-17 RX ORDER — LIDOCAINE HYDROCHLORIDE 10 MG/ML
5 INJECTION, SOLUTION EPIDURAL; INFILTRATION; INTRACAUDAL; PERINEURAL AS NEEDED
Status: DISCONTINUED | OUTPATIENT
Start: 2022-06-17 | End: 2022-06-18 | Stop reason: HOSPADM

## 2022-06-17 RX ORDER — SODIUM CHLORIDE 0.9 % (FLUSH) 0.9 %
10 SYRINGE (ML) INJECTION AS NEEDED
Status: DISCONTINUED | OUTPATIENT
Start: 2022-06-17 | End: 2022-06-18 | Stop reason: HOSPADM

## 2022-06-17 RX ORDER — SODIUM CHLORIDE 0.9 % (FLUSH) 0.9 %
3 SYRINGE (ML) INJECTION EVERY 12 HOURS SCHEDULED
Status: DISCONTINUED | OUTPATIENT
Start: 2022-06-17 | End: 2022-06-18 | Stop reason: HOSPADM

## 2022-06-17 RX ORDER — METOPROLOL TARTRATE 100 MG/1
100 TABLET ORAL ONCE AS NEEDED
Status: DISCONTINUED | OUTPATIENT
Start: 2022-06-17 | End: 2022-06-18 | Stop reason: HOSPADM

## 2022-06-17 RX ORDER — METOPROLOL TARTRATE 5 MG/5ML
5 INJECTION INTRAVENOUS
Status: DISCONTINUED | OUTPATIENT
Start: 2022-06-17 | End: 2022-06-18 | Stop reason: HOSPADM

## 2022-06-17 RX ADMIN — NITROGLYCERIN 0.4 MG: 0.4 TABLET SUBLINGUAL at 08:59

## 2022-06-17 RX ADMIN — NITROGLYCERIN 0.4 MG: 0.4 TABLET SUBLINGUAL at 08:53

## 2022-06-17 RX ADMIN — IOPAMIDOL 100 ML: 755 INJECTION, SOLUTION INTRAVENOUS at 09:00

## 2022-06-24 ENCOUNTER — APPOINTMENT (OUTPATIENT)
Dept: CARDIOLOGY | Facility: HOSPITAL | Age: 47
End: 2022-06-24

## 2022-06-24 ENCOUNTER — APPOINTMENT (OUTPATIENT)
Dept: CT IMAGING | Facility: HOSPITAL | Age: 47
End: 2022-06-24

## 2022-06-29 ENCOUNTER — HOSPITAL ENCOUNTER (OUTPATIENT)
Dept: CT IMAGING | Facility: HOSPITAL | Age: 47
Discharge: HOME | End: 2022-06-29
Admitting: INTERNAL MEDICINE

## 2022-06-29 VITALS
HEIGHT: 66 IN | TEMPERATURE: 98.5 F | WEIGHT: 245 LBS | HEART RATE: 67 BPM | SYSTOLIC BLOOD PRESSURE: 129 MMHG | DIASTOLIC BLOOD PRESSURE: 67 MMHG | BODY MASS INDEX: 39.37 KG/M2 | RESPIRATION RATE: 18 BRPM | OXYGEN SATURATION: 99 %

## 2022-06-29 DIAGNOSIS — R07.9 CHEST PAIN IN ADULT: ICD-10-CM

## 2022-06-29 LAB
BUN SERPL-MCNC: 13 MG/DL (ref 6–20)
CREAT SERPL-MCNC: 0.67 MG/DL (ref 0.57–1)
EGFRCR SERPLBLD CKD-EPI 2021: 107.3 ML/MIN/1.73

## 2022-06-29 PROCEDURE — 84520 ASSAY OF UREA NITROGEN: CPT | Performed by: INTERNAL MEDICINE

## 2022-06-29 PROCEDURE — 0 IOPAMIDOL PER 1 ML: Performed by: INTERNAL MEDICINE

## 2022-06-29 PROCEDURE — 75574 CT ANGIO HRT W/3D IMAGE: CPT | Performed by: INTERNAL MEDICINE

## 2022-06-29 PROCEDURE — 82565 ASSAY OF CREATININE: CPT | Performed by: INTERNAL MEDICINE

## 2022-06-29 PROCEDURE — 75574 CT ANGIO HRT W/3D IMAGE: CPT

## 2022-06-29 RX ORDER — SODIUM CHLORIDE 0.9 % (FLUSH) 0.9 %
3 SYRINGE (ML) INJECTION EVERY 12 HOURS SCHEDULED
Status: DISCONTINUED | OUTPATIENT
Start: 2022-06-29 | End: 2022-06-30 | Stop reason: HOSPADM

## 2022-06-29 RX ORDER — SODIUM CHLORIDE 0.9 % (FLUSH) 0.9 %
10 SYRINGE (ML) INJECTION AS NEEDED
Status: DISCONTINUED | OUTPATIENT
Start: 2022-06-29 | End: 2022-06-30 | Stop reason: HOSPADM

## 2022-06-29 RX ORDER — METOPROLOL TARTRATE 50 MG/1
50 TABLET, FILM COATED ORAL ONCE AS NEEDED
Status: DISCONTINUED | OUTPATIENT
Start: 2022-06-29 | End: 2022-06-30 | Stop reason: HOSPADM

## 2022-06-29 RX ORDER — NITROGLYCERIN 0.4 MG/1
0.4 TABLET SUBLINGUAL
Status: CANCELLED | OUTPATIENT
Start: 2022-06-29 | End: 2022-06-29

## 2022-06-29 RX ORDER — METOPROLOL TARTRATE 50 MG/1
TABLET, FILM COATED ORAL
Status: DISCONTINUED
Start: 2022-06-29 | End: 2022-06-30 | Stop reason: HOSPADM

## 2022-06-29 RX ORDER — LIDOCAINE HYDROCHLORIDE 10 MG/ML
5 INJECTION, SOLUTION EPIDURAL; INFILTRATION; INTRACAUDAL; PERINEURAL AS NEEDED
Status: DISCONTINUED | OUTPATIENT
Start: 2022-06-29 | End: 2022-06-30 | Stop reason: HOSPADM

## 2022-06-29 RX ORDER — METOPROLOL TARTRATE 50 MG/1
100 TABLET, FILM COATED ORAL ONCE AS NEEDED
Status: DISCONTINUED | OUTPATIENT
Start: 2022-06-29 | End: 2022-06-30 | Stop reason: HOSPADM

## 2022-06-29 RX ORDER — NITROGLYCERIN 0.4 MG/1
TABLET SUBLINGUAL
Status: COMPLETED | OUTPATIENT
Start: 2022-06-29 | End: 2022-06-29

## 2022-06-29 RX ADMIN — IOPAMIDOL 100 ML: 755 INJECTION, SOLUTION INTRAVENOUS at 14:14

## 2022-06-29 RX ADMIN — NITROGLYCERIN 0.4 MG: 0.4 TABLET SUBLINGUAL at 13:58

## 2022-06-29 RX ADMIN — NITROGLYCERIN 0.4 MG: 0.4 TABLET SUBLINGUAL at 14:04

## 2022-07-15 ENCOUNTER — HOSPITAL ENCOUNTER (OUTPATIENT)
Dept: CARDIOLOGY | Facility: HOSPITAL | Age: 47
Discharge: HOME | End: 2022-07-15
Admitting: INTERNAL MEDICINE

## 2022-07-15 VITALS
BODY MASS INDEX: 39.33 KG/M2 | SYSTOLIC BLOOD PRESSURE: 129 MMHG | HEIGHT: 66 IN | WEIGHT: 244.71 LBS | DIASTOLIC BLOOD PRESSURE: 67 MMHG

## 2022-07-15 DIAGNOSIS — R06.09 DOE (DYSPNEA ON EXERTION): ICD-10-CM

## 2022-07-15 PROCEDURE — 93306 TTE W/DOPPLER COMPLETE: CPT

## 2022-07-15 PROCEDURE — 93306 TTE W/DOPPLER COMPLETE: CPT | Performed by: INTERNAL MEDICINE

## 2022-07-22 ENCOUNTER — OFFICE VISIT (OUTPATIENT)
Dept: CARDIOLOGY | Facility: CLINIC | Age: 47
End: 2022-07-22

## 2022-07-22 VITALS
HEIGHT: 66 IN | DIASTOLIC BLOOD PRESSURE: 78 MMHG | WEIGHT: 243 LBS | BODY MASS INDEX: 39.05 KG/M2 | OXYGEN SATURATION: 96 % | HEART RATE: 77 BPM | SYSTOLIC BLOOD PRESSURE: 118 MMHG

## 2022-07-22 DIAGNOSIS — R06.09 DOE (DYSPNEA ON EXERTION): ICD-10-CM

## 2022-07-22 DIAGNOSIS — E66.01 MORBID OBESITY WITH BMI OF 40.0-44.9, ADULT: ICD-10-CM

## 2022-07-22 DIAGNOSIS — Z82.49 FAMILY HISTORY OF EARLY CAD: ICD-10-CM

## 2022-07-22 DIAGNOSIS — Q23.1 BICUSPID AORTIC VALVE: ICD-10-CM

## 2022-07-22 DIAGNOSIS — R07.9 CHEST PAIN IN ADULT: Primary | ICD-10-CM

## 2022-07-22 DIAGNOSIS — I10 PRIMARY HYPERTENSION: ICD-10-CM

## 2022-07-22 DIAGNOSIS — R29.818 SUSPECTED SLEEP APNEA: ICD-10-CM

## 2022-07-22 PROBLEM — Q23.81 BICUSPID AORTIC VALVE: Status: ACTIVE | Noted: 2022-07-22

## 2022-07-22 PROCEDURE — 99214 OFFICE O/P EST MOD 30 MIN: CPT | Performed by: INTERNAL MEDICINE

## 2022-07-22 NOTE — PROGRESS NOTES
Aliza Garcia  [id number]  1975  47 y.o.  female    Referring Provider: Aliza Panda DO    Reason for  Visit:      Here for routine follow up   Initial visit for chest pain and shortness of breath   Elevated BP   coronary CT angiography report as below        Subjective    Overall feels the same   No new events or complaints since last visit   Overall the patient feels no major change from baseline symptoms   Similar symptoms as during last visit      No further chest pain   Mild chronic exertional shortness of breath on exertion relieved with rest  No significant cough or wheezing    No palpitations  No associated chest pain  No significant pedal edema    No fever or chills  No significant expectoration    No hemoptysis  No presyncope or syncope    Tolerating current medications well with no untoward side effects   Compliant with prescribed medication regimen. Tries to adhere to cardiac diet.     Now more active and no chest pain       History of present illness:  Aliza Garcia is a 47 y.o. yo female with essential hypertension   who presents today for   Chief Complaint   Patient presents with   • Follow-up     Results    .    History  Past Medical History:   Diagnosis Date   • Abnormal ECG    • Acid reflux    • Arrhythmia    • Bicuspid aortic valve 7/22/2022   • Disease of thyroid gland    • Hyperlipidemia    • Primary hypertension 06/10/2022   ,   Past Surgical History:   Procedure Laterality Date   • APPENDECTOMY N/A 05/14/2018    Procedure: APPENDECTOMY LAPAROSCOPIC;  Surgeon: Ange Dominguez MD;  Location: [facility];  Service: General   • CHOLECYSTECTOMY     ,   Family History   Problem Relation Age of Onset   • Hypertension Mother    • Diabetes Mother    • Hypothyroidism Mother    • Heart disease Father    • Hypertension Father    • Diabetes Father    • Atrial fibrillation Father    • Arrhythmia Father         A Fib   • Hyperlipidemia Father    • No Known Problems Sister    • No Known  Problems Brother    • No Known Problems Maternal Aunt    • No Known Problems Paternal Aunt    • No Known Problems Maternal Grandmother    • Cancer Maternal Grandfather    • No Known Problems Paternal Grandmother    • Cancer Paternal Grandfather    • No Known Problems Daughter    • No Known Problems Son    • No Known Problems Other    • Breast cancer Neg Hx    • BRCA 1/2 Neg Hx    • Colon cancer Neg Hx    • Endometrial cancer Neg Hx    • Ovarian cancer Neg Hx    ,   Social History     Tobacco Use   • Smoking status: Never Smoker   • Smokeless tobacco: Never Used   Vaping Use   • Vaping Use: Never used   Substance Use Topics   • Alcohol use: Yes     Comment: occasional only   • Drug use: Never   ,     Medications  Current Outpatient Medications   Medication Sig Dispense Refill   • amLODIPine (NORVASC) 5 MG tablet Take 5 mg by mouth Daily.     • amphetamine-dextroamphetamine (ADDERALL) 10 MG tablet Take 10 mg by mouth Daily.     • aspirin 81 MG EC tablet Take 81 mg by mouth Daily.     • cloNIDine (CATAPRES) 0.1 MG tablet      • hydroCHLOROthiazide (MICROZIDE) 12.5 MG capsule Take 1 capsule by mouth Daily. 90 capsule 3   • liothyronine (CYTOMEL) 5 MCG tablet Take 5 mcg by mouth Daily.     • losartan (Cozaar) 25 MG tablet Take 1 tablet by mouth Daily. 90 tablet 3   • metoprolol succinate XL (TOPROL-XL) 100 MG 24 hr tablet Take 100 mg by mouth Daily.     • Milk Thistle 1000 MG capsule Take  by mouth.     • norethindrone-ethinyl estradiol (ORTHO-NOVUM 1-35 TAB,NORTREL 1-35 TAB) 1-35 MG-MCG per tablet Take 1 tablet by mouth Daily.     • norethindrone-ethinyl estradiol 0.5/0.75/1-35 MG-MCG per tablet Take 1 tablet by mouth Daily.     • RABEprazole Sodium (ACIPHEX PO) Take  by mouth Every Night.     • sertraline (ZOLOFT) 25 MG tablet Take 25 mg by mouth Daily.     • Synthroid 150 MCG tablet Take 150 mcg by mouth Daily.     • Turmeric 1053 MG tablet Take  by mouth.     • Evolocumab (REPATHA) solution prefilled syringe  "injection Inject 1 mL under the skin into the appropriate area as directed Every 14 (Fourteen) Days for 6 doses. 1 mL 3     No current facility-administered medications for this visit.       Allergies:  Bactrim [sulfamethoxazole-trimethoprim]    Review of Systems  Review of Systems   Constitutional: Negative.   HENT: Negative.    Eyes: Negative.    Cardiovascular: Positive for chest pain and dyspnea on exertion. Negative for claudication, cyanosis, irregular heartbeat, leg swelling, near-syncope, orthopnea, palpitations, paroxysmal nocturnal dyspnea and syncope.   Respiratory: Negative.    Endocrine: Negative.    Hematologic/Lymphatic: Negative.    Skin: Negative.    Gastrointestinal: Negative for anorexia.   Genitourinary: Negative.    Neurological: Negative.    Psychiatric/Behavioral: Negative.        Objective     Physical Exam:  /78   Pulse 77   Ht 167.6 cm (66\")   Wt 110 kg (243 lb)   SpO2 96%   BMI 39.22 kg/m²     Physical Exam  Constitutional:       Appearance: Normal appearance.   HENT:      Head: Normocephalic.   Eyes:      General: Lids are normal.   Neck:      Vascular: No carotid bruit.   Cardiovascular:      Rate and Rhythm: Regular rhythm.      Heart sounds: S1 normal and S2 normal. Murmur heard.    Systolic murmur is present with a grade of 2/6.  Pulmonary:      Effort: Pulmonary effort is normal.   Abdominal:      Palpations: Abdomen is soft.   Neurological:      Mental Status: She is alert.   Psychiatric:         Speech: Speech normal.         Behavior: Behavior normal.         Thought Content: Thought content normal.         Results Review:      Aliza Garcia  Complete Transthoracic Echocardiogram with Complete Doppler and Color Flow  Order# [id number]  Ordering physician: Osvaldo Lozano MD Study date: 7/15/22       Patient Information    Patient Name   Aliza Garcia MRN   [id number] Legal Sex   Female  (Age)   1975 (47 y.o.)      [facility] PACS Images     Show images for Adult " Transthoracic Echo Complete w/ Color, Spectral and Contrast if necessary per protocol     Sedation Narrator Report    Sedation Narrator Report     Interpretation Summary    · Left ventricular ejection fraction appears to be 56 - 60%. Left ventricular systolic function is normal.  · Left ventricular diastolic function was normal.  · A bicuspid aortic valve is suggested.  · There is calcification of the aortic valve.  · Estimated right ventricular systolic pressure from tricuspid regurgitation is normal (<35 mmHg).           Cardiac CT angiography  6/30/2022     Impression:      Total calcium score (using PARKER calcium score calculator): LAD 5.7, total 5.7  This is elevated and at the 90 th percentile for matched population        Coronary angiography:  Normal left main coronary artery  No obstructive disease of the left anterior descending coronary artery or diagonal branches  Normal small left circumflex coronary artery  Ostium of small obtuse marginal branch not well visualized but do not suspect any obstructive disease  Normal right coronary artery and its branches  Overall no evidence of any obstructive coronary artery disease identified in any of the visualized epicardial vessels     ___________________________________________________________________________     Recommendations:     Recommend continuing on aspirin therapy  Consider statin therapy if tolerated with target LDL well below 70 mg/dL  Ongoing risk factor modifications  Further evaluation if ongoing chest pain or high risk of suspicion of significant ischemic heart disease     Results for orders placed during the hospital encounter of 03/10/17    Adult Stress Echo Only    Interpretation Summary  Low risk for ischemia       AORTA:   Peak systolic velocity of 93.83 cm/s with a normal waveform.     OTHER FINDINGS: Urinary bladder appears unremarkable.     IMPRESSION:  1. Normal grayscale appearance of bilateral kidneys.   2. No Doppler evidence of renal  artery stenosis.     This report was finalized on 2022 14:57 by Dr. Bennett Diaz MD.    Aliza Garcia  Stress test only, exercise  Order# [id number]  Reading physician: John Diaz MD Ordering physician: Aliza Panda DO Study date: 22       Patient Information    Patient Name   Aliza Garcia MRN   [id number] Legal Sex   Female  (Age)   1975 (47 y.o.)         Interpretation Summary       · The patient reported chest pain and shortness of breath during the stress test.     EQUIVOCAL RISK FOR ISCHEMIA - CONSIDER REPEAT STUDY WITH IMAGING        ____________________________________________________________________________________________________________________________________________  Health maintenance and recommendations    Low salt/ HTN/ Heart healthy carbohydrate restricted cardiac diet   The patient is advised to reduce or avoid caffeine or other cardiac stimulants.   Minimize or avoid  NSAID-type medications      Monitor for any signs of bleeding including red or dark stools. Fall precautions.   Advised staying uptodate with immunizations per established standard guidelines.    Offered to give patient  a copy of my notes     Questions were encouraged, asked and answered to the patient's  understanding and satisfaction. Questions if any regarding current medications and side effects, need for refills and importance of compliance to medications stressed.    Reviewed available prior notes, consults, prior visits, laboratory findings, radiology and cardiology relevant reports. Updated chart as applicable. I have reviewed the patient's medical history in detail and updated the computerized patient record as relevant.      Updated patient regarding any new or relevant abnormalities on review of records or any new findings on physical exam. Mentioned to patient about purpose of visit and desirable health short and long term goals and objectives.    Primary to monitor CBC  CMP Lipid panel and TSH as applicable    ___________________________________________________________________________________________________________________________________________   Procedures    Assessment & Plan   Diagnoses and all orders for this visit:    1. Chest pain in adult (Primary)    2. HERNANDEZ (dyspnea on exertion)    3. Family history of early CAD    4. Morbid obesity with BMI of 40.0-44.9, adult (HCC)  -     Ambulatory Referral to Bariatric Surgery    5. Primary hypertension    6. Suspected sleep apnea    7. Bicuspid aortic valve    Other orders  -     Evolocumab (REPATHA) solution prefilled syringe injection; Inject 1 mL under the skin into the appropriate area as directed Every 14 (Fourteen) Days for 6 doses.  Dispense: 1 mL; Refill: 3          Plan    May increase Losartan to 50 mg daily   Check BP and heart rates twice daily initially till blood pressures and heart rates under good control and then at least 3x / week,   If blood pressures continue to be well-controlled then can check week a month  at home and bring a recording for review next visit  If BP >130/85 or < 100/60 persistently over 3 reading 30 mins apart or if heart rates persistently above 100 bpm or less than 55 bpm call sooner for evaluation and advise     Intolerant to statin   Consider injectable lipid lowering agents in future if LDL not at goal   Requested Prescriptions     Signed Prescriptions Disp Refills   • Evolocumab (REPATHA) solution prefilled syringe injection 1 mL 3     Sig: Inject 1 mL under the skin into the appropriate area as directed Every 14 (Fourteen) Days for 6 doses.    Keep LDL below 70 mg/dl. Monitor liver and renal functions.   Monitor CBC, CMP, TSH (as indicated) and Lipid Panel by primary     Can take PO clonidine 0.1 mg BID prn for BP > 150/90 mm Hg      Follow up with Esther RAMSAY , Giorgi RAMSAY, Gavi Gonzalez PA-C  or myself          Return in about 6 weeks (around 9/2/2022).

## 2022-07-31 LAB
BH CV ECHO LEFT VENTRICLE GLOBAL LONGITUDINAL STRAIN: -14 %
BH CV ECHO MEAS - AO MAX PG: 23.2 MMHG
BH CV ECHO MEAS - AO MEAN PG: 10.6 MMHG
BH CV ECHO MEAS - AO ROOT DIAM: 2.9 CM
BH CV ECHO MEAS - AO V2 MAX: 241 CM/SEC
BH CV ECHO MEAS - AO V2 VTI: 45 CM
BH CV ECHO MEAS - AVA(I,D): 1.9 CM2
BH CV ECHO MEAS - EDV(CUBED): 69.9 ML
BH CV ECHO MEAS - EDV(MOD-SP2): 81.4 ML
BH CV ECHO MEAS - EDV(MOD-SP4): 68.4 ML
BH CV ECHO MEAS - EF(MOD-BP): 69.2 %
BH CV ECHO MEAS - EF(MOD-SP2): 65.4 %
BH CV ECHO MEAS - EF(MOD-SP4): 72.5 %
BH CV ECHO MEAS - ESV(CUBED): 20.3 ML
BH CV ECHO MEAS - ESV(MOD-SP2): 28.2 ML
BH CV ECHO MEAS - ESV(MOD-SP4): 18.8 ML
BH CV ECHO MEAS - FS: 33.7 %
BH CV ECHO MEAS - IVS/LVPW: 1.04 CM
BH CV ECHO MEAS - IVSD: 0.9 CM
BH CV ECHO MEAS - LA DIMENSION: 3.4 CM
BH CV ECHO MEAS - LAT PEAK E' VEL: 12 CM/SEC
BH CV ECHO MEAS - LV DIASTOLIC VOL/BSA (35-75): 31.4 CM2
BH CV ECHO MEAS - LV MASS(C)D: 112.5 GRAMS
BH CV ECHO MEAS - LV MAX PG: 10 MMHG
BH CV ECHO MEAS - LV MEAN PG: 8 MMHG
BH CV ECHO MEAS - LV SYSTOLIC VOL/BSA (12-30): 8.6 CM2
BH CV ECHO MEAS - LV V1 MAX: 157 CM/SEC
BH CV ECHO MEAS - LV V1 VTI: 24 CM
BH CV ECHO MEAS - LVIDD: 4.1 CM
BH CV ECHO MEAS - LVIDS: 2.7 CM
BH CV ECHO MEAS - LVOT AREA: 2.8 CM2
BH CV ECHO MEAS - LVOT DIAM: 1.9 CM
BH CV ECHO MEAS - LVPWD: 0.87 CM
BH CV ECHO MEAS - MED PEAK E' VEL: 11.2 CM/SEC
BH CV ECHO MEAS - MR MAX PG: 44.3 MMHG
BH CV ECHO MEAS - MR MAX VEL: 314 CM/SEC
BH CV ECHO MEAS - MV A MAX VEL: 87.8 CM/SEC
BH CV ECHO MEAS - MV DEC SLOPE: 461 CM/SEC2
BH CV ECHO MEAS - MV DEC TIME: 0.3 MSEC
BH CV ECHO MEAS - MV E MAX VEL: 116 CM/SEC
BH CV ECHO MEAS - MV E/A: 1.32
BH CV ECHO MEAS - MV MAX PG: 8.2 MMHG
BH CV ECHO MEAS - MV MEAN PG: 5 MMHG
BH CV ECHO MEAS - MV P1/2T: 94 MSEC
BH CV ECHO MEAS - MV V2 VTI: 29.2 CM
BH CV ECHO MEAS - MVA(P1/2T): 2.34 CM2
BH CV ECHO MEAS - MVA(VTI): 3.9 CM2
BH CV ECHO MEAS - PA V2 MAX: 133 CM/SEC
BH CV ECHO MEAS - RAP SYSTOLE: 5 MMHG
BH CV ECHO MEAS - RVDD: 3 CM
BH CV ECHO MEAS - RVSP: 34.2 MMHG
BH CV ECHO MEAS - SI(MOD-SP2): 24.4 ML/M2
BH CV ECHO MEAS - SI(MOD-SP4): 22.8 ML/M2
BH CV ECHO MEAS - SV(LVOT): 112.6 ML
BH CV ECHO MEAS - SV(MOD-SP2): 53.2 ML
BH CV ECHO MEAS - SV(MOD-SP4): 49.6 ML
BH CV ECHO MEAS - TR MAX PG: 29.2 MMHG
BH CV ECHO MEAS - TR MAX VEL: 270 CM/SEC
BH CV ECHO MEASUREMENTS AVERAGE E/E' RATIO: 10
BH CV VAS BP LEFT ARM: NORMAL MMHG
LEFT ATRIUM VOLUME INDEX: 17.2 ML/M2
LEFT ATRIUM VOLUME: 37.5 ML
MAXIMAL PREDICTED HEART RATE: 171 BPM
STRESS TARGET HR: 145 BPM

## 2022-08-10 ENCOUNTER — APPOINTMENT (OUTPATIENT)
Dept: CARDIOLOGY | Facility: HOSPITAL | Age: 47
End: 2022-08-10

## 2022-08-19 ENCOUNTER — TELEPHONE (OUTPATIENT)
Dept: BARIATRICS/WEIGHT MGMT | Facility: CLINIC | Age: 47
End: 2022-08-19

## 2022-08-19 NOTE — TELEPHONE ENCOUNTER
Patient was called to remind them of their new patient appointment and to bring completed paperwork or arrive 30 minutes early. The patient was agreeable and voiced an understanding.       Patient will complete paperwork and [id number]

## 2022-08-22 ENCOUNTER — OFFICE VISIT (OUTPATIENT)
Dept: BARIATRICS/WEIGHT MGMT | Facility: CLINIC | Age: 47
End: 2022-08-22

## 2022-08-22 VITALS
SYSTOLIC BLOOD PRESSURE: 136 MMHG | HEIGHT: 66 IN | WEIGHT: 241.6 LBS | HEART RATE: 86 BPM | BODY MASS INDEX: 38.83 KG/M2 | DIASTOLIC BLOOD PRESSURE: 84 MMHG | OXYGEN SATURATION: 95 % | TEMPERATURE: 97.5 F

## 2022-08-22 DIAGNOSIS — E78.5 HYPERLIPIDEMIA, UNSPECIFIED HYPERLIPIDEMIA TYPE: ICD-10-CM

## 2022-08-22 DIAGNOSIS — E66.01 CLASS 2 SEVERE OBESITY DUE TO EXCESS CALORIES WITH SERIOUS COMORBIDITY AND BODY MASS INDEX (BMI) OF 39.0 TO 39.9 IN ADULT: Primary | ICD-10-CM

## 2022-08-22 DIAGNOSIS — I10 PRIMARY HYPERTENSION: ICD-10-CM

## 2022-08-22 DIAGNOSIS — K21.9 GASTROESOPHAGEAL REFLUX DISEASE, UNSPECIFIED WHETHER ESOPHAGITIS PRESENT: ICD-10-CM

## 2022-08-22 PROBLEM — E66.812 CLASS 2 SEVERE OBESITY DUE TO EXCESS CALORIES WITH SERIOUS COMORBIDITY AND BODY MASS INDEX (BMI) OF 39.0 TO 39.9 IN ADULT: Status: ACTIVE | Noted: 2022-06-10

## 2022-08-22 PROCEDURE — 99204 OFFICE O/P NEW MOD 45 MIN: CPT | Performed by: NURSE PRACTITIONER

## 2022-08-22 NOTE — PROGRESS NOTES
Patient Care Team:  Aliza Panda DO as PCP - General  Aliza Panda DO as PCP - Family Medicine  Lisy Early PA as Referring Physician (Physician Assistant)  Osvaldo Lozano MD as Cardiologist (Cardiology)      Subjective     Patient is a 47 y.o. female presents with morbid obesity and her Body mass index is 39.59 kg/m².     She is here for discussion of surgical weight loss options.  She stated she has been with the disease of obesity for year(s).  She stated she suffers from HTN, hyperlipidemia and GERD and morbid obesity due to her weight gain.  She stated that weight loss helps alleviate these symptoms.   She stated that she has tried other diet regimens such as weight watchers, phentermine and slim fast to help with weight loss.  She stated that she has attempted these conservative methods for weight loss without maintaining long term success.  Today she would like to discuss surgical weight loss options such as the Laparoscopic Sleeve Gastrectomy or the Laparoscopic R - Y Gastric Bypass.     Review of Systems   Constitutional: Positive for fatigue and unexpected weight gain.   Respiratory: Positive for shortness of breath.    Cardiovascular: Negative.    Gastrointestinal: Negative.    Endocrine: Negative.    Musculoskeletal: Positive for arthralgias and back pain.   Hematological: Bruises/bleeds easily.   Psychiatric/Behavioral: Negative.         History  Past Medical History:   Diagnosis Date   • Abnormal ECG    • Acid reflux    • Arrhythmia    • Bicuspid aortic valve 7/22/2022   • Disease of thyroid gland    • Hyperlipidemia    • Primary hypertension 06/10/2022      Past Surgical History:   Procedure Laterality Date   • APPENDECTOMY N/A 05/14/2018    Procedure: APPENDECTOMY LAPAROSCOPIC;  Surgeon: Ange Dominguez MD;  Location: [facility];  Service: General   • CHOLECYSTECTOMY        Social History     Socioeconomic History   • Marital status:    Tobacco Use   • Smoking  status: Never Smoker   • Smokeless tobacco: Never Used   Vaping Use   • Vaping Use: Never used   Substance and Sexual Activity   • Alcohol use: Yes     Comment: occasional only   • Drug use: Never   • Sexual activity: Yes     Partners: Male     Comment:       Family History   Problem Relation Age of Onset   • Hypertension Mother    • Diabetes Mother    • Hypothyroidism Mother    • Heart disease Father    • Hypertension Father    • Diabetes Father    • Atrial fibrillation Father    • Arrhythmia Father         A Fib   • Hyperlipidemia Father    • No Known Problems Sister    • No Known Problems Brother    • No Known Problems Daughter    • No Known Problems Son    • No Known Problems Maternal Aunt    • No Known Problems Paternal Aunt    • No Known Problems Maternal Grandmother    • Cancer Maternal Grandfather    • No Known Problems Paternal Grandmother    • Cancer Paternal Grandfather    • No Known Problems Other    • Breast cancer Neg Hx    • BRCA 1/2 Neg Hx    • Colon cancer Neg Hx    • Endometrial cancer Neg Hx    • Ovarian cancer Neg Hx       Allergies   Allergen Reactions   • Bactrim [Sulfamethoxazole-Trimethoprim] Rash          Current Outpatient Medications:   •  amLODIPine (NORVASC) 5 MG tablet, Take 5 mg by mouth Daily., Disp: , Rfl:   •  amphetamine-dextroamphetamine (ADDERALL) 10 MG tablet, Take 10 mg by mouth Daily., Disp: , Rfl:   •  aspirin 81 MG EC tablet, Take 81 mg by mouth Daily., Disp: , Rfl:   •  cloNIDine (CATAPRES) 0.1 MG tablet, , Disp: , Rfl:   •  hydroCHLOROthiazide (MICROZIDE) 12.5 MG capsule, Take 1 capsule by mouth Daily., Disp: 90 capsule, Rfl: 3  •  liothyronine (CYTOMEL) 5 MCG tablet, Take 5 mcg by mouth Daily., Disp: , Rfl:   •  losartan (Cozaar) 25 MG tablet, Take 1 tablet by mouth Daily., Disp: 90 tablet, Rfl: 3  •  metoprolol succinate XL (TOPROL-XL) 100 MG 24 hr tablet, Take 100 mg by mouth Daily., Disp: , Rfl:   •  Milk Thistle 1000 MG capsule, Take  by mouth., Disp: , Rfl:    •  norethindrone-ethinyl estradiol (ORTHO-NOVUM 1-35 TAB,NORTREL 1-35 TAB) 1-35 MG-MCG per tablet, Take 1 tablet by mouth Daily., Disp: , Rfl:   •  RABEprazole Sodium (ACIPHEX PO), Take  by mouth Every Night., Disp: , Rfl:   •  sertraline (ZOLOFT) 25 MG tablet, Take 25 mg by mouth Daily., Disp: , Rfl:   •  Synthroid 150 MCG tablet, Take 150 mcg by mouth Daily., Disp: , Rfl:   •  Turmeric 1053 MG tablet, Take  by mouth., Disp: , Rfl:   •  Evolocumab (REPATHA) solution prefilled syringe injection, Inject 1 mL under the skin into the appropriate area as directed Every 14 (Fourteen) Days for 6 doses., Disp: 1 mL, Rfl: 3  •  norethindrone-ethinyl estradiol 0.5/0.75/1-35 MG-MCG per tablet, Take 1 tablet by mouth Daily., Disp: , Rfl:     Objective     Vital Signs  Temp:  [97.5 °F (36.4 °C)] 97.5 °F (36.4 °C)  Heart Rate:  [86] 86  BP: (136)/(84) 136/84  Body mass index is 39.59 kg/m².      08/22/22  0830   Weight: 110 kg (241 lb 9.6 oz)       Physical Exam  Vitals reviewed.   Constitutional:       Appearance: She is obese.   Cardiovascular:      Rate and Rhythm: Normal rate and regular rhythm.   Pulmonary:      Effort: Pulmonary effort is normal.   Abdominal:      General: Bowel sounds are normal.      Palpations: Abdomen is soft.   Musculoskeletal:         General: Normal range of motion.   Skin:     General: Skin is warm and dry.   Neurological:      Mental Status: She is alert and oriented to person, place, and time.   Psychiatric:         Mood and Affect: Mood normal.         Behavior: Behavior normal.          Results Review:   I reviewed the patient's new clinical results.      Class 2 Severe Obesity (BMI >=35 and <=39.9). Obesity-related health conditions include the following: hypertension, dyslipidemias and GERD. Obesity is improving with treatment. BMI is is above average; BMI management plan is completed. We discussed portion control and increasing exercise.      Assessment & Plan   Diagnoses and all orders  for this visit:    1. Class 2 severe obesity due to excess calories with serious comorbidity and body mass index (BMI) of 39.0 to 39.9 in adult (HCC) (Primary)  Comments:  Prescription meal plan prescribed and reviewed with patient.  Assessment & Plan:  Patient's (Body mass index is 39.59 kg/m².) indicates that they are morbidly obese (BMI > 40 or > 35 with obesity - related health condition) with health conditions that include hypertension, dyslipidemias and GERD . Weight is improving with treatment. BMI is is above average; BMI management plan is completed. We discussed portion control and increasing exercise.     Orders:  -     Bariatric Nutritional Counseling    2. Primary hypertension  Assessment & Plan:  Hypertension is unchanged.  Continue current treatment regimen.  Dietary sodium restriction.  Weight loss.  Blood pressure will be reassessed at the next regular appointment.      3. Hyperlipidemia, unspecified hyperlipidemia type  Assessment & Plan:  Lipid abnormalities are unchanged.  Nutritional counseling was provided. and The patient was referred to the dietician.  Lipids will be reassessed with PCP .      4. Gastroesophageal reflux disease, unspecified whether esophagitis present  Comments:  EGD will be ordered at later date.  Nutritional counseling provided.        I discussed the patient's findings and my recommendations with patient.     I have also recommended that she obtain a cardiac risk assessment and psychiatric evaluation prior to surgery consideration.    1. Patient is a 47 y.o. female who has morbid obesity with Body mass index is 39.59 kg/m². and desires surgical weight loss. Patient has been advised that this surgery is considered to be elective major surgery that is typically done laparoscopically. Patient is a potentially good surgical candidate. Patient will need to meet with the Bariatric Surgeon to further discuss surgical options. Patient has been advised that they will need to have a  work-up prior to surgery. This work-up will include but is not limited to an EKG, an EGD to assess for H. Pylori and Simmons's esophagus, and a psychological evaluation, with additional testing as necessary. Pre-op testing will be ordered at next visit. Today the patient  received the 4 meals/day diet prescription, which was explained to patient.  Patient will see the dietitian today to further discuss goals for diet, exercise, and lifestyle. Patient has received intensive behavioral therapy for obesity today. I explained the pathophysiology of the disease and its storage component. We also discussed Dr. Castro' pearls of the program. Nutrition counseling ordered today.     2. Current comorbid condition of  hyperlipidemia, hypertension, GERD associated with her morbid obesity is reported to be stable on her current treatment regimen and medications. We anticipate the comorbid condition to improve as we address her morbid obesity.          Pre-op testing:     Seminar - Completed  EGD - Needed, but not yet ordered  H. Pylori - Will be done with EGD   H. Pylori Stool -  N/A    Psychological Evaluation - Needed, but not yet ordered    EKG - Needed, but not yet ordered    Cardiology - If EKG abnormal, cardiology will be ordered     TSH - Needed, but not yet ordered  Nicotine - N/A    Pulmonary Clearance - N/A   Drug Tests - N/A    Dietitian -  NEEDS    SOBIA Carpenter     08/22/22  11:13 CDT

## 2022-08-22 NOTE — ASSESSMENT & PLAN NOTE
Patient's (Body mass index is 39.59 kg/m².) indicates that they are morbidly obese (BMI > 40 or > 35 with obesity - related health condition) with health conditions that include hypertension, dyslipidemias and GERD . Weight is improving with treatment. BMI is is above average; BMI management plan is completed. We discussed portion control and increasing exercise.

## 2022-08-22 NOTE — ASSESSMENT & PLAN NOTE
Lipid abnormalities are unchanged.  Nutritional counseling was provided. and The patient was referred to the dietician.  Lipids will be reassessed with PCP .

## 2022-08-23 ENCOUNTER — TELEPHONE (OUTPATIENT)
Dept: BARIATRICS/WEIGHT MGMT | Facility: CLINIC | Age: 47
End: 2022-08-23

## 2022-08-23 NOTE — TELEPHONE ENCOUNTER
Attempted New Patient follow up call to review meal plan and answer any questions. No answer. Left VM with office number if Pt would like to reach out.

## 2022-08-26 ENCOUNTER — PATIENT ROUNDING (BHMG ONLY) (OUTPATIENT)
Dept: BARIATRICS/WEIGHT MGMT | Facility: CLINIC | Age: 47
End: 2022-08-26

## 2022-08-26 NOTE — PROGRESS NOTES
August 26, 2022    Hello, may I speak with Aliza CHINO Jose?    My name is Georgie       I am  with [facility]  [address], [address]  [place] [address] [phone].    Before we get started may I verify your date of birth? 1975    I am calling to officially welcome you to our practice and ask about your recent visit. Is this a good time to talk? Left message for a call call back.    Tell me about your visit with us. What things went well?  Left message for a call back .       We're always looking for ways to make our patients' experiences even better. Do you have recommendations on ways we may improve?    Left message for a call back .    Overall were you satisfied with your first visit to our practice? Left message for a call back .       I appreciate you taking the time to speak with me today. Is there anything else I can do for you? Left message for a call back .      Thank you, and have a great day.

## 2022-09-09 ENCOUNTER — OFFICE VISIT (OUTPATIENT)
Dept: CARDIOLOGY | Facility: CLINIC | Age: 47
End: 2022-09-09

## 2022-09-09 VITALS
SYSTOLIC BLOOD PRESSURE: 140 MMHG | HEART RATE: 82 BPM | OXYGEN SATURATION: 98 % | WEIGHT: 238 LBS | DIASTOLIC BLOOD PRESSURE: 92 MMHG | BODY MASS INDEX: 39 KG/M2

## 2022-09-09 DIAGNOSIS — K21.9 GASTROESOPHAGEAL REFLUX DISEASE, UNSPECIFIED WHETHER ESOPHAGITIS PRESENT: ICD-10-CM

## 2022-09-09 DIAGNOSIS — E66.01 CLASS 2 SEVERE OBESITY DUE TO EXCESS CALORIES WITH SERIOUS COMORBIDITY AND BODY MASS INDEX (BMI) OF 39.0 TO 39.9 IN ADULT: ICD-10-CM

## 2022-09-09 DIAGNOSIS — Z82.49 FAMILY HISTORY OF EARLY CAD: ICD-10-CM

## 2022-09-09 DIAGNOSIS — R06.09 DOE (DYSPNEA ON EXERTION): Primary | ICD-10-CM

## 2022-09-09 DIAGNOSIS — E78.5 HYPERLIPIDEMIA, UNSPECIFIED HYPERLIPIDEMIA TYPE: ICD-10-CM

## 2022-09-09 DIAGNOSIS — E66.01 SEVERE OBESITY (BMI 35.0-39.9) WITH COMORBIDITY: ICD-10-CM

## 2022-09-09 DIAGNOSIS — R07.9 CHEST PAIN IN ADULT: ICD-10-CM

## 2022-09-09 DIAGNOSIS — R29.818 SUSPECTED SLEEP APNEA: ICD-10-CM

## 2022-09-09 DIAGNOSIS — I10 PRIMARY HYPERTENSION: ICD-10-CM

## 2022-09-09 DIAGNOSIS — Q23.1 BICUSPID AORTIC VALVE: ICD-10-CM

## 2022-09-09 PROCEDURE — 99214 OFFICE O/P EST MOD 30 MIN: CPT | Performed by: INTERNAL MEDICINE

## 2022-09-09 RX ORDER — LOSARTAN POTASSIUM 50 MG/1
50 TABLET ORAL DAILY
Qty: 90 TABLET | Refills: 3 | Status: SHIPPED | OUTPATIENT
Start: 2022-09-09

## 2022-09-09 NOTE — PROGRESS NOTES
Aliza Garcia  [id number]  1975  47 y.o.  female    Referring Provider: Aliza Panda DO    Reason for  Visit:      Here for routine follow up   Initial visit for chest pain and shortness of breath   Elevated BP   coronary CT angiography report as below        Subjective    Overall feels the same   No new events or complaints since last visit   Overall the patient feels no major change from baseline symptoms   Similar symptoms as during last visit      No further chest pain   Mild chronic exertional shortness of breath on exertion relieved with rest  No significant cough or wheezing    No palpitations  No associated chest pain  No significant pedal edema    No fever or chills  No significant expectoration    No hemoptysis  No presyncope or syncope    Tolerating current medications well with no untoward side effects   Compliant with prescribed medication regimen. Tries to adhere to cardiac diet.     Now more active and no chest pain       History of present illness:  Aliza Garcia is a 47 y.o. yo female with essential hypertension   who presents today for   Chief Complaint   Patient presents with   • Follow-up   .    History  Past Medical History:   Diagnosis Date   • Abnormal ECG    • Acid reflux    • Arrhythmia    • Bicuspid aortic valve 7/22/2022   • Disease of thyroid gland    • Hyperlipidemia    • Primary hypertension 06/10/2022   ,   Past Surgical History:   Procedure Laterality Date   • APPENDECTOMY N/A 05/14/2018    Procedure: APPENDECTOMY LAPAROSCOPIC;  Surgeon: Ange Dominguez MD;  Location: [facility];  Service: General   • CHOLECYSTECTOMY     ,   Family History   Problem Relation Age of Onset   • Hypertension Mother    • Diabetes Mother    • Hypothyroidism Mother    • Heart disease Father    • Hypertension Father    • Diabetes Father    • Atrial fibrillation Father    • Arrhythmia Father         A Fib   • Hyperlipidemia Father    • No Known Problems Sister    • No Known Problems  Brother    • No Known Problems Daughter    • No Known Problems Son    • No Known Problems Maternal Aunt    • No Known Problems Paternal Aunt    • No Known Problems Maternal Grandmother    • Cancer Maternal Grandfather    • No Known Problems Paternal Grandmother    • Cancer Paternal Grandfather    • No Known Problems Other    • Breast cancer Neg Hx    • BRCA 1/2 Neg Hx    • Colon cancer Neg Hx    • Endometrial cancer Neg Hx    • Ovarian cancer Neg Hx    ,   Social History     Tobacco Use   • Smoking status: Never Smoker   • Smokeless tobacco: Never Used   Vaping Use   • Vaping Use: Never used   Substance Use Topics   • Alcohol use: Yes     Comment: occasional only   • Drug use: Never   ,     Medications  Current Outpatient Medications   Medication Sig Dispense Refill   • amLODIPine (NORVASC) 5 MG tablet Take 5 mg by mouth Daily.     • amphetamine-dextroamphetamine (ADDERALL) 10 MG tablet Take 10 mg by mouth Daily.     • aspirin 81 MG EC tablet Take 81 mg by mouth Daily.     • cloNIDine (CATAPRES) 0.1 MG tablet      • Evolocumab (REPATHA) solution prefilled syringe injection Inject 1 mL under the skin into the appropriate area as directed Every 14 (Fourteen) Days for 6 doses. 1 mL 3   • hydroCHLOROthiazide (MICROZIDE) 12.5 MG capsule Take 1 capsule by mouth Daily. 90 capsule 3   • liothyronine (CYTOMEL) 5 MCG tablet Take 5 mcg by mouth Daily.     • losartan (Cozaar) 50 MG tablet Take 1 tablet by mouth Daily. 90 tablet 3   • metoprolol succinate XL (TOPROL-XL) 100 MG 24 hr tablet Take 100 mg by mouth Daily.     • Milk Thistle 1000 MG capsule Take  by mouth.     • norethindrone-ethinyl estradiol (ORTHO-NOVUM 1-35 TAB,NORTREL 1-35 TAB) 1-35 MG-MCG per tablet Take 1 tablet by mouth Daily.     • norethindrone-ethinyl estradiol 0.5/0.75/1-35 MG-MCG per tablet Take 1 tablet by mouth Daily.     • RABEprazole Sodium (ACIPHEX PO) Take  by mouth Every Night.     • sertraline (ZOLOFT) 25 MG tablet Take 25 mg by mouth Daily.     •  Synthroid 150 MCG tablet Take 150 mcg by mouth Daily.     • Turmeric 1053 MG tablet Take  by mouth.       No current facility-administered medications for this visit.       Allergies:  Statins and Bactrim [sulfamethoxazole-trimethoprim]    Review of Systems  Review of Systems   Constitutional: Negative.   HENT: Negative.    Eyes: Negative.    Cardiovascular: Positive for dyspnea on exertion. Negative for chest pain, claudication, cyanosis, irregular heartbeat, leg swelling, near-syncope, orthopnea, palpitations, paroxysmal nocturnal dyspnea and syncope.   Respiratory: Negative.    Endocrine: Negative.    Hematologic/Lymphatic: Negative.    Skin: Negative.    Gastrointestinal: Negative for anorexia.   Genitourinary: Negative.    Neurological: Negative.    Psychiatric/Behavioral: Negative.        Objective     Physical Exam:  /92   Pulse 82   Wt 108 kg (238 lb)   SpO2 98%   BMI 39.00 kg/m²     Physical Exam  Constitutional:       Appearance: Normal appearance.   HENT:      Head: Normocephalic.   Eyes:      General: Lids are normal.   Neck:      Vascular: No carotid bruit.   Cardiovascular:      Rate and Rhythm: Regular rhythm.      Heart sounds: S1 normal and S2 normal. Murmur heard.    Systolic murmur is present with a grade of 2/6.  Pulmonary:      Effort: Pulmonary effort is normal.   Abdominal:      Palpations: Abdomen is soft.   Neurological:      Mental Status: She is alert.   Psychiatric:         Speech: Speech normal.         Behavior: Behavior normal.         Thought Content: Thought content normal.         Results Review:      Aliza Garcia  Complete Transthoracic Echocardiogram with Complete Doppler and Color Flow  Order# [id number]  Ordering physician: Osvaldo Lozano MD Study date: 7/15/22       Patient Information    Patient Name   Aliza Garcia MRN   [id number] Legal Sex   Female  (Age)   1975 (47 y.o.)      [facility] PACS Images     Show images for Adult Transthoracic Echo Complete w/  Color, Spectral and Contrast if necessary per protocol     Sedation Narrator Report    Sedation Narrator Report     Interpretation Summary    · Left ventricular ejection fraction appears to be 56 - 60%. Left ventricular systolic function is normal.  · Left ventricular diastolic function was normal.  · A bicuspid aortic valve is suggested.  · There is calcification of the aortic valve.  · Estimated right ventricular systolic pressure from tricuspid regurgitation is normal (<35 mmHg).           Cardiac CT angiography  6/30/2022     Impression:      Total calcium score (using PARKER calcium score calculator): LAD 5.7, total 5.7  This is elevated and at the 90 th percentile for matched population        Coronary angiography:  Normal left main coronary artery  No obstructive disease of the left anterior descending coronary artery or diagonal branches  Normal small left circumflex coronary artery  Ostium of small obtuse marginal branch not well visualized but do not suspect any obstructive disease  Normal right coronary artery and its branches  Overall no evidence of any obstructive coronary artery disease identified in any of the visualized epicardial vessels     ___________________________________________________________________________     Recommendations:     Recommend continuing on aspirin therapy  Consider statin therapy if tolerated with target LDL well below 70 mg/dL  Ongoing risk factor modifications  Further evaluation if ongoing chest pain or high risk of suspicion of significant ischemic heart disease     Results for orders placed during the hospital encounter of 07/15/22    Adult Transthoracic Echo Complete w/ Color, Spectral and Contrast if necessary per protocol    Interpretation Summary  · Left ventricular ejection fraction appears to be 56 - 60%. Left ventricular systolic function is normal.  · Left ventricular diastolic function was normal.  · Abnormal global longitudinal LV strain (GLS) = -14.0%  · A  bicuspid aortic valve is suggested.  · Mild aortic valve stenosis is present. Mild aortic regurgitation  · Estimated right ventricular systolic pressure from tricuspid regurgitation is normal (<35 mmHg).  · Report delayed as patient called back for additional imaging and subsequent processing of imaging and availability for reporting       AORTA:   Peak systolic velocity of 93.83 cm/s with a normal waveform.     OTHER FINDINGS: Urinary bladder appears unremarkable.     IMPRESSION:  1. Normal grayscale appearance of bilateral kidneys.   2. No Doppler evidence of renal artery stenosis.     This report was finalized on 2022 14:57 by Dr. Bennett Diaz MD.    Aliza Garcia  Stress test only, exercise  Order# [id number]  Reading physician: John Diaz MD Ordering physician: Aliza Panda DO Study date: 22       Patient Information    Patient Name   Aliza Garcia MRN   [id number] Legal Sex   Female  (Age)   1975 (47 y.o.)         Interpretation Summary       · The patient reported chest pain and shortness of breath during the stress test.     EQUIVOCAL RISK FOR ISCHEMIA - CONSIDER REPEAT STUDY WITH IMAGING        ____________________________________________________________________________________________________________________________________________  Health maintenance and recommendations    Low salt/ HTN/ Heart healthy carbohydrate restricted cardiac diet   The patient is advised to reduce or avoid caffeine or other cardiac stimulants.   Minimize or avoid  NSAID-type medications      Monitor for any signs of bleeding including red or dark stools. Fall precautions.   Advised staying uptodate with immunizations per established standard guidelines.    Offered to give patient  a copy of my notes     Questions were encouraged, asked and answered to the patient's  understanding and satisfaction. Questions if any regarding current medications and side effects, need for refills and  importance of compliance to medications stressed.    Reviewed available prior notes, consults, prior visits, laboratory findings, radiology and cardiology relevant reports. Updated chart as applicable. I have reviewed the patient's medical history in detail and updated the computerized patient record as relevant.      Updated patient regarding any new or relevant abnormalities on review of records or any new findings on physical exam. Mentioned to patient about purpose of visit and desirable health short and long term goals and objectives.    Primary to monitor CBC CMP Lipid panel and TSH as applicable    ___________________________________________________________________________________________________________________________________________   Procedures    Assessment & Plan   Diagnoses and all orders for this visit:    1. HERNANDEZ (dyspnea on exertion) (Primary)    2. Class 2 severe obesity due to excess calories with serious comorbidity and body mass index (BMI) of 39.0 to 39.9 in adult ([facility])    3. Chest pain in adult    4. Bicuspid aortic valve    5. Gastroesophageal reflux disease, unspecified whether esophagitis present    6. Family history of early CAD    7. Hyperlipidemia, unspecified hyperlipidemia type  -     CBC & Differential; Future  -     Comprehensive Metabolic Panel; Future  -     Lipid Panel; Future  -     TSH; Future    8. Primary hypertension    9. Suspected sleep apnea    10. Severe obesity (BMI 35.0-39.9) with comorbidity ([facility])  -     TSH; Future    Other orders  -     losartan (Cozaar) 50 MG tablet; Take 1 tablet by mouth Daily.  Dispense: 90 tablet; Refill: 3          Plan      Needs better BP control      Requested Prescriptions     Signed Prescriptions Disp Refills   • losartan (Cozaar) 50 MG tablet 90 tablet 3     Sig: Take 1 tablet by mouth Daily.      Check BP and heart rates twice daily initially till blood pressures and heart rates under good control and then at least 3x / week,   If blood  pressures continue to be well-controlled then can check week a month  at home and bring a recording for review next visit  If BP >130/85 or < 100/60 persistently over 3 reading 30 mins apart or if heart rates persistently above 100 bpm or less than 55 bpm call sooner for evaluation and advise         Orders Placed This Encounter   Procedures   • Comprehensive Metabolic Panel     Standing Status:   Future     Standing Expiration Date:   9/9/2023     Order Specific Question:   Release to patient     Answer:   Routine Release   • Lipid Panel     Standing Status:   Future     Standing Expiration Date:   9/9/2023     Order Specific Question:   Release to patient     Answer:   Routine Release   • TSH     Standing Status:   Future     Standing Expiration Date:   9/9/2023     Order Specific Question:   Release to patient     Answer:   Routine Release   • CBC & Differential     Standing Status:   Future     Standing Expiration Date:   9/9/2023     Order Specific Question:   Manual Differential     Answer:   No     Order Specific Question:   Release to patient     Answer:   Routine Release      Patient expressed understanding  Encouraged and answered all questions   Discussed with the patient and all questioned fully answered. She will call me if any problems arise.   Discussed results of prior testing with patient : echo and cardiac CTA      Intolerant to statin   Consider injectable lipid lowering agents in future if LDL not at goal   Keep LDL below 70 mg/dl. Monitor liver and renal functions.   Monitor CBC, CMP, TSH (as indicated) and Lipid Panel by primary     Awaiting sleep study     Follow up with Esther RAMSAY  or Giorgi RAMSAY               Return in about 6 months (around 3/9/2023).

## 2022-09-12 ENCOUNTER — LAB (OUTPATIENT)
Dept: LAB | Facility: HOSPITAL | Age: 47
End: 2022-09-12

## 2022-09-12 DIAGNOSIS — E78.5 HYPERLIPIDEMIA, UNSPECIFIED HYPERLIPIDEMIA TYPE: ICD-10-CM

## 2022-09-12 DIAGNOSIS — E66.01 SEVERE OBESITY (BMI 35.0-39.9) WITH COMORBIDITY: ICD-10-CM

## 2022-09-12 LAB
ALBUMIN SERPL-MCNC: 4 G/DL (ref 3.5–5.2)
ALBUMIN/GLOB SERPL: 1.1 G/DL
ALP SERPL-CCNC: 114 U/L (ref 39–117)
ALT SERPL W P-5'-P-CCNC: 26 U/L (ref 1–33)
ANION GAP SERPL CALCULATED.3IONS-SCNC: 7 MMOL/L (ref 5–15)
AST SERPL-CCNC: 17 U/L (ref 1–32)
BASOPHILS # BLD AUTO: 0.05 10*3/MM3 (ref 0–0.2)
BASOPHILS NFR BLD AUTO: 0.5 % (ref 0–1.5)
BILIRUB SERPL-MCNC: 0.2 MG/DL (ref 0–1.2)
BUN SERPL-MCNC: 17 MG/DL (ref 6–20)
BUN/CREAT SERPL: 23 (ref 7–25)
CALCIUM SPEC-SCNC: 9.6 MG/DL (ref 8.6–10.5)
CHLORIDE SERPL-SCNC: 101 MMOL/L (ref 98–107)
CHOLEST SERPL-MCNC: 240 MG/DL (ref 0–200)
CO2 SERPL-SCNC: 30 MMOL/L (ref 22–29)
CREAT SERPL-MCNC: 0.74 MG/DL (ref 0.57–1)
DEPRECATED RDW RBC AUTO: 45.1 FL (ref 37–54)
EGFRCR SERPLBLD CKD-EPI 2021: 99.3 ML/MIN/1.73
EOSINOPHIL # BLD AUTO: 0.25 10*3/MM3 (ref 0–0.4)
EOSINOPHIL NFR BLD AUTO: 2.3 % (ref 0.3–6.2)
ERYTHROCYTE [DISTWIDTH] IN BLOOD BY AUTOMATED COUNT: 13.4 % (ref 12.3–15.4)
GLOBULIN UR ELPH-MCNC: 3.8 GM/DL
GLUCOSE SERPL-MCNC: 112 MG/DL (ref 65–99)
HCT VFR BLD AUTO: 43.2 % (ref 34–46.6)
HDLC SERPL-MCNC: 40 MG/DL (ref 40–60)
HGB BLD-MCNC: 13.6 G/DL (ref 12–15.9)
IMM GRANULOCYTES # BLD AUTO: 0.06 10*3/MM3 (ref 0–0.05)
IMM GRANULOCYTES NFR BLD AUTO: 0.5 % (ref 0–0.5)
LDLC SERPL CALC-MCNC: 169 MG/DL (ref 0–100)
LDLC/HDLC SERPL: 4.17 {RATIO}
LYMPHOCYTES # BLD AUTO: 2.21 10*3/MM3 (ref 0.7–3.1)
LYMPHOCYTES NFR BLD AUTO: 20.1 % (ref 19.6–45.3)
MCH RBC QN AUTO: 28.6 PG (ref 26.6–33)
MCHC RBC AUTO-ENTMCNC: 31.5 G/DL (ref 31.5–35.7)
MCV RBC AUTO: 90.8 FL (ref 79–97)
MONOCYTES # BLD AUTO: 0.56 10*3/MM3 (ref 0.1–0.9)
MONOCYTES NFR BLD AUTO: 5.1 % (ref 5–12)
NEUTROPHILS NFR BLD AUTO: 7.85 10*3/MM3 (ref 1.7–7)
NEUTROPHILS NFR BLD AUTO: 71.5 % (ref 42.7–76)
NRBC BLD AUTO-RTO: 0 /100 WBC (ref 0–0.2)
PLATELET # BLD AUTO: 307 10*3/MM3 (ref 140–450)
PMV BLD AUTO: 10.7 FL (ref 6–12)
POTASSIUM SERPL-SCNC: 3.9 MMOL/L (ref 3.5–5.2)
PROT SERPL-MCNC: 7.8 G/DL (ref 6–8.5)
RBC # BLD AUTO: 4.76 10*6/MM3 (ref 3.77–5.28)
SODIUM SERPL-SCNC: 138 MMOL/L (ref 136–145)
TRIGL SERPL-MCNC: 167 MG/DL (ref 0–150)
TSH SERPL DL<=0.05 MIU/L-ACNC: 3.65 UIU/ML (ref 0.27–4.2)
VLDLC SERPL-MCNC: 31 MG/DL (ref 5–40)
WBC NRBC COR # BLD: 10.98 10*3/MM3 (ref 3.4–10.8)

## 2022-09-12 PROCEDURE — 80061 LIPID PANEL: CPT

## 2022-09-12 PROCEDURE — 36415 COLL VENOUS BLD VENIPUNCTURE: CPT

## 2022-09-12 PROCEDURE — 80050 GENERAL HEALTH PANEL: CPT

## 2022-09-20 ENCOUNTER — OFFICE VISIT (OUTPATIENT)
Dept: BARIATRICS/WEIGHT MGMT | Facility: CLINIC | Age: 47
End: 2022-09-20

## 2022-09-20 VITALS
DIASTOLIC BLOOD PRESSURE: 84 MMHG | HEART RATE: 77 BPM | TEMPERATURE: 98.4 F | HEIGHT: 66 IN | WEIGHT: 240.8 LBS | OXYGEN SATURATION: 94 % | BODY MASS INDEX: 38.7 KG/M2 | SYSTOLIC BLOOD PRESSURE: 129 MMHG

## 2022-09-20 DIAGNOSIS — E66.01 CLASS 2 SEVERE OBESITY DUE TO EXCESS CALORIES WITH SERIOUS COMORBIDITY AND BODY MASS INDEX (BMI) OF 39.0 TO 39.9 IN ADULT: Primary | ICD-10-CM

## 2022-09-20 DIAGNOSIS — I10 PRIMARY HYPERTENSION: ICD-10-CM

## 2022-09-20 DIAGNOSIS — K21.9 GASTROESOPHAGEAL REFLUX DISEASE, UNSPECIFIED WHETHER ESOPHAGITIS PRESENT: ICD-10-CM

## 2022-09-20 DIAGNOSIS — E78.5 HYPERLIPIDEMIA, UNSPECIFIED HYPERLIPIDEMIA TYPE: ICD-10-CM

## 2022-09-20 PROCEDURE — 99213 OFFICE O/P EST LOW 20 MIN: CPT | Performed by: NURSE PRACTITIONER

## 2022-09-20 NOTE — PROGRESS NOTES
"Patient Care Team:  Aliza Panda DO as PCP - General  Aliza Panda DO as PCP - Family Medicine  Lisy Early PA as Referring Physician (Physician Assistant)  Osvaldo Lozano MD as Cardiologist (Cardiology)    Reason for Visit:  Medical Weight loss, V2    Subjective   Aliza Garcia is a 47 y.o. female.     Aliza is here for follow-up and continued medical management of her morbid obesity.  She is currently on a prescription diet.  Aliza previously was to apply dietary changes such as following the meal plan as directed.  She admits to not following the meal plan.  As a result she lost weight since her last visit.  She has lost 1 lb since her last appointment with us.  She states that she is struggled with time management and has not follow the prescription meal plan over the past month.  Patient states that she recently discovered her insurance would not cover bariatric surgery.  Patient is going to remain in our medical weight loss program at this time.    Review Of Systems:  Review of Systems   Constitutional: Positive for fatigue.   Respiratory: Negative.    Cardiovascular: Negative.    Gastrointestinal: Negative.    Endocrine: Negative.    Musculoskeletal: Positive for arthralgias and back pain.   Psychiatric/Behavioral: Negative.          The following portions of the patient's history were reviewed and updated as appropriate: allergies, current medications, past family history, past medical history, past social history, past surgical history, and problem list.    Objective   /84 (BP Location: Right arm, Patient Position: Sitting, Cuff Size: Adult)   Pulse 77   Temp 98.4 °F (36.9 °C)   Ht 166.4 cm (65.5\")   Wt 109 kg (240 lb 12.8 oz)   SpO2 94%   BMI 39.46 kg/m²       09/20/22  0850   Weight: 109 kg (240 lb 12.8 oz)       Physical Exam  Vitals reviewed.   Constitutional:       Appearance: She is obese.   Cardiovascular:      Rate and Rhythm: Normal rate and regular " rhythm.   Pulmonary:      Effort: Pulmonary effort is normal.   Abdominal:      General: Bowel sounds are normal.   Musculoskeletal:         General: Normal range of motion.   Skin:     General: Skin is warm and dry.   Neurological:      Mental Status: She is alert and oriented to person, place, and time.   Psychiatric:         Mood and Affect: Mood normal.         Behavior: Behavior normal.         Class 2 Severe Obesity (BMI >=35 and <=39.9). Obesity-related health conditions include the following: hypertension, dyslipidemias and GERD. Obesity is improving with treatment. BMI is is above average; BMI management plan is completed. We discussed portion control and increasing exercise.     Assessment & Plan   Diagnoses and all orders for this visit:    1. Class 2 severe obesity due to excess calories with serious comorbidity and body mass index (BMI) of 39.0 to 39.9 in adult ([facility]) (Primary)  Assessment & Plan:  Patient's (Body mass index is 39.46 kg/m².) indicates that they are morbidly obese (BMI > 40 or > 35 with obesity - related health condition) with health conditions that include hypertension and dyslipidemias . Weight is unchanged. BMI is is above average; BMI management plan is completed. We discussed portion control and increasing exercise.       2. Hyperlipidemia, unspecified hyperlipidemia type  Comments:  Continue current regimen.  Nutritional counseling has been provided today.    3. Primary hypertension  Comments:  Continue current regimen.    4. Gastroesophageal reflux disease, unspecified whether esophagitis present  Comments:  Discussed that reflux will hopefully improve with diet modifications.  Continue current regimen.       Aliza Garcia was seen today for follow-up, obesity, nutrition counseling and weight loss.  She has gained weight since her last visit.  Today we discussed healthy changes in lifestyle, diet, and exercise. Dietician consultation obtained.  Aliza Garcia had received  handouts to her explaining the recommendation on portion sizes/appetite control/reading nutrition labels.   Intensive behavioral therapy for obesity was done today as well.     Goals for this month are:   1.  Advised to begin following prescription meal plan.  Discussed some meal ideas to assist with on the go.  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  I encouraged patient to paca meals that can be eaten cold.  Patient will also meet with dietitian today.  Patient has been encouraged to join our online social media page to assist with meal prep ideas and recipes.  2.  Discussed meal prepping to assist with time management.    Follow up in one month for a weight recheck.

## 2022-09-20 NOTE — PROGRESS NOTES
"Metabolic and Bariatric Surgery Adult Nutrition Assessment    Patient Name: Aliza Garcia   YOB: 1975   MRN: [id number]     Assessment Date:  09/20/2022     Reason for Visit: Initial Nutrition Assessment     Treatment Pathway: Medical Weight Loss    Assessment    Anthropometrics   Wt Readings from Last 1 Encounters:   09/20/22 109 kg (240 lb 12.8 oz)     Ht Readings from Last 1 Encounters:   09/20/22 166.4 cm (65.5\")     BMI Readings from Last 1 Encounters:   09/20/22 39.46 kg/m²        Initial Weight/Date: 241.6 lbs (Aug 2022)  Weight Changes since last visit: -0.8 lbs  Net Weight Change: -0.8 lbs    Past Medical History:   Diagnosis Date   • Abnormal ECG    • Acid reflux    • Arrhythmia    • Bicuspid aortic valve 7/22/2022   • Disease of thyroid gland    • Hyperlipidemia    • Primary hypertension 06/10/2022      Past Surgical History:   Procedure Laterality Date   • APPENDECTOMY N/A 05/14/2018    Procedure: APPENDECTOMY LAPAROSCOPIC;  Surgeon: Ange Dominguez MD;  Location: [facility];  Service: General   • CHOLECYSTECTOMY        Current Outpatient Medications   Medication Sig Dispense Refill   • amLODIPine (NORVASC) 5 MG tablet Take 5 mg by mouth Daily.     • amphetamine-dextroamphetamine (ADDERALL) 10 MG tablet Take 10 mg by mouth Daily.     • aspirin 81 MG EC tablet Take 81 mg by mouth Daily.     • cloNIDine (CATAPRES) 0.1 MG tablet      • Evolocumab (REPATHA) solution prefilled syringe injection Inject 1 mL under the skin into the appropriate area as directed Every 14 (Fourteen) Days for 6 doses. 1 mL 3   • hydroCHLOROthiazide (MICROZIDE) 12.5 MG capsule Take 1 capsule by mouth Daily. 90 capsule 3   • liothyronine (CYTOMEL) 5 MCG tablet Take 5 mcg by mouth Daily.     • losartan (Cozaar) 50 MG tablet Take 1 tablet by mouth Daily. 90 tablet 3   • metoprolol succinate XL (TOPROL-XL) 100 MG 24 hr tablet Take 100 mg by mouth Daily.     • Milk Thistle 1000 MG capsule Take  by mouth.     • " norethindrone-ethinyl estradiol (ORTHO-NOVUM 1-35 TAB,NORTREL 1-35 TAB) 1-35 MG-MCG per tablet Take 1 tablet by mouth Daily.     • norethindrone-ethinyl estradiol 0.5/0.75/1-35 MG-MCG per tablet Take 1 tablet by mouth Daily.     • RABEprazole Sodium (ACIPHEX PO) Take  by mouth Every Night.     • sertraline (ZOLOFT) 25 MG tablet Take 25 mg by mouth Daily.     • Synthroid 150 MCG tablet Take 150 mcg by mouth Daily.     • Turmeric 1053 MG tablet Take  by mouth.       No current facility-administered medications for this visit.      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 Very overwhelmed this first month. Has ordered some flavored stevia. Has been on Mediterranean diet for ~ 2months. On the weekends has no energy and sleeping most of the day.     Nutrition Recall  Eating 2-3 meals daily   (M1) oatmeal with blueberries and honey. Sometimes with a cup of coffee or water.   (M2) If able to pack lunch will pack a sandwich with deli sliced turkey/chicken with mustard or low fat-martinez. With carrots and grapes/fruit. Sometimes with a tomato.  (M3) Using Mediterranean cookbook. Will choose a recipe. Using mostly olive oil to flavor/season. Last night did chicken burgers with asparagus and broccoli with sprinkled parm. Cheese. Using some spray butter. Uses coconut oil on indoor grill.   (M4) n/a  Snacking - not usually sometimes nuts.   Monitoring portions- not monitoring currently, has tried to reduce personal serving size.   Calculating Protein- not calculating.   Drinking sugary/carbonated beverages- Occasionally a coke (less than 1-2/month)  Fluid Intake- Drinking 6-8 glasses of water daily. Occasionally drinking skim milk but has been reducing intake.      Success this Month: Has reduced intake of nighttime snacking, eating smaller portions, more  conscious.   Barriers: Does not have set routine; works out of car and is on the go; has a hard time conceptualizing which foods to pair together to make a meal.    Exercise: None  Recommended increasing physical activity, beyond normal daily habits, gradually working to reach ~30 minutes daily.     Nutrition Intervention  Nutrition education and nutrition coaching for behavior change provided.  Strategies used included Comprehensive education, Motivational Interviewing , Problem Solving, Skill Development for meal planning and scheduling meals, understanding ways to stay meal compliant on the go, and creating an environment of success, Ongoing reinforcement and Provided sample menus  Review of medical weight loss prescription 4 meal/day plan and reviewed nutritional needs for Medical Weight Loss.  Self-monitoring strategies such as keeping a food journal (on paper or electronically) and calculating fluid/protein intake were discussed.    Recommended Diet Changes  Eat 4 meals per day with protein and vegetables at each meal, no carbs after meal 2., Protein goal: 65gms., Eat vegetables first at each meal., Discussed protein guidelines for shakes and bars., Reduce snacking -use foods from free foods list only., Reduce fat, sugar, and/or salt in food choices., Choose more nutrient dense foods., Choose foods with increased fiber., Monitor portion sizes using a food scale and/or measuring cup., Eliminate soda and sugar-sweetened beverages and Increase fluid intake to 64 ounces per day      Goals  1. Set four meal time frames. 7am. 10:30-12:30, 2-4 pm, 7-8 pm  2. Each night spend 30 minutes to plan and prepare meals 2-4 for the next day.       Monitoring/Evaluation Plan  Anticipate follow up per program protocol. Continue collaboration of care with physician and treatment team.     Electronically signed by  Emilie Shaw RDN, LD  09/20/2022 09:38 CDT.

## 2022-09-20 NOTE — ASSESSMENT & PLAN NOTE
Patient's (Body mass index is 39.46 kg/m².) indicates that they are morbidly obese (BMI > 40 or > 35 with obesity - related health condition) with health conditions that include hypertension and dyslipidemias . Weight is unchanged. BMI is is above average; BMI management plan is completed. We discussed portion control and increasing exercise.

## 2022-09-22 ENCOUNTER — OFFICE VISIT (OUTPATIENT)
Dept: NEUROLOGY | Facility: CLINIC | Age: 47
End: 2022-09-22

## 2022-09-22 VITALS
DIASTOLIC BLOOD PRESSURE: 75 MMHG | WEIGHT: 239 LBS | OXYGEN SATURATION: 100 % | HEIGHT: 66 IN | HEART RATE: 73 BPM | SYSTOLIC BLOOD PRESSURE: 122 MMHG | BODY MASS INDEX: 38.41 KG/M2

## 2022-09-22 DIAGNOSIS — G47.19 DAYTIME HYPERSOMNOLENCE: Primary | ICD-10-CM

## 2022-09-22 DIAGNOSIS — G47.63 SLEEP RELATED TEETH GRINDING: ICD-10-CM

## 2022-09-22 DIAGNOSIS — R06.83 SNORING: ICD-10-CM

## 2022-09-22 PROCEDURE — 99213 OFFICE O/P EST LOW 20 MIN: CPT | Performed by: NURSE PRACTITIONER
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
## 2022-10-27 RX ORDER — METOPROLOL SUCCINATE 100 MG/1
100 TABLET, EXTENDED RELEASE ORAL DAILY
Qty: 30 TABLET | Refills: 11 | Status: SHIPPED | OUTPATIENT
Start: 2022-10-27

## 2022-11-01 ENCOUNTER — HOSPITAL ENCOUNTER (OUTPATIENT)
Dept: SLEEP MEDICINE | Facility: HOSPITAL | Age: 47
Discharge: HOME | End: 2022-11-01
Admitting: NURSE PRACTITIONER

## 2022-11-01 DIAGNOSIS — R06.83 SNORING: ICD-10-CM

## 2022-11-01 DIAGNOSIS — G47.63 SLEEP RELATED TEETH GRINDING: ICD-10-CM

## 2022-11-01 DIAGNOSIS — G47.19 DAYTIME HYPERSOMNOLENCE: ICD-10-CM

## 2022-11-01 PROCEDURE — 95806 SLEEP STUDY UNATT&RESP EFFT: CPT

## 2022-11-01 PROCEDURE — 95806 SLEEP STUDY UNATT&RESP EFFT: CPT | Performed by: PSYCHIATRY & NEUROLOGY

## 2022-11-08 ENCOUNTER — DOCUMENTATION (OUTPATIENT)
Dept: NEUROLOGY | Facility: CLINIC | Age: 47
End: 2022-11-08

## 2022-11-08 DIAGNOSIS — G47.33 OBSTRUCTIVE SLEEP APNEA: Primary | ICD-10-CM

## 2022-11-08 NOTE — PROGRESS NOTES
I have attempted to contact the patient in regards to her sleep study results.  I did not leave a message as the message did not identify the patient. I did send her letter with her results and need for a titration.

## 2022-12-02 RX ORDER — AMLODIPINE BESYLATE 5 MG/1
TABLET ORAL
Qty: 30 TABLET | Refills: 11 | Status: SHIPPED | OUTPATIENT
Start: 2022-12-02

## 2023-01-02 ENCOUNTER — HOSPITAL ENCOUNTER (OUTPATIENT)
Dept: SLEEP MEDICINE | Facility: HOSPITAL | Age: 48
Discharge: HOME | End: 2023-01-02
Payer: COMMERCIAL

## 2023-01-02 DIAGNOSIS — G47.33 OBSTRUCTIVE SLEEP APNEA: ICD-10-CM

## 2023-01-30 ENCOUNTER — TELEPHONE (OUTPATIENT)
Dept: NEUROLOGY | Facility: CLINIC | Age: 48
End: 2023-01-30
Payer: COMMERCIAL

## 2023-01-30 DIAGNOSIS — G47.33 OBSTRUCTIVE SLEEP APNEA: Primary | ICD-10-CM

## 2023-01-30 NOTE — TELEPHONE ENCOUNTER
LEFT A MESSAGE FOR MRS. STORY REGARDING THE TITRATION STUDY, ORDERED BY HAYLEY LOBO. I EXPLAINED THAT HER INSURANCE WILL NOT AUTHORIZE AN IN-LAB TITRATION FOR HER, AT THIS TIME. UNLESS THERE HAS BEEN A CHANGE IN GUIDELINES SINCE THE NEW YEAR, I AM NOT SURE WHY IT COULD NOT BE AUTHORIZED. INSURANCE REQUIRES AN APAP TRIAL BEFORE INITIATING A CPAP. I DID LET MRS. ALCAZAR KNOW THAT THE APAP WILL TREAT HER APNEA AND THAT [employer] WILL RUN A COMPLIANCE REPORT, AFTER 30 DAYS OF USE, TO CHECK ON SUCCESS OF THE APAP. IF THE REPORT SHOWS ANY FURTHER EVENTS WITH ADEQUATE USE, [employer] WILL THEN REQUEST AN IN-LAB STUDY, DUE TO FAILURE OF APAP.    ALSO, I WILL NEED TO KNOW IF SHE HAS A PREFERENCE FOR THE [employer] COMPANY [facility] WILL SEND THE APAP ORDERS TO.    I LEFT MY DIRECT LINE AND ASKED FOR A CALL BACK TO CONFIRM SHE RECEIVED MY MESSAGE AND ANSWER ANY QUESTIONS MRS. ALCAZAR MAY HAVE.

## 2023-02-16 NOTE — TELEPHONE ENCOUNTER
Caller: FELECIA STORY    Relationship: PATIENT     Best call back number: [phone]    What is the best time to reach you: ANY    Who are you requesting to speak with (clinical staff, provider,  specific staff member): MUKESH    What was the call regarding:PATIENT TELEPHONED TO ADVISE HER PREFERRED DME COMPANY IS [employer] @ [phone] OR [employer] [phone]  Do you require a callback: NO

## 2023-02-25 ENCOUNTER — OFFICE VISIT (OUTPATIENT)
Age: 48
End: 2023-02-25

## 2023-02-25 VITALS
RESPIRATION RATE: 18 BRPM | DIASTOLIC BLOOD PRESSURE: 78 MMHG | SYSTOLIC BLOOD PRESSURE: 117 MMHG | WEIGHT: 255 LBS | BODY MASS INDEX: 40.98 KG/M2 | TEMPERATURE: 99 F | HEIGHT: 66 IN | HEART RATE: 94 BPM | OXYGEN SATURATION: 97 %

## 2023-02-25 DIAGNOSIS — Z11.59 SCREENING FOR VIRAL DISEASE: ICD-10-CM

## 2023-02-25 DIAGNOSIS — R50.9 FEVER, UNSPECIFIED FEVER CAUSE: ICD-10-CM

## 2023-02-25 DIAGNOSIS — J06.9 VIRAL UPPER RESPIRATORY TRACT INFECTION: Primary | ICD-10-CM

## 2023-02-25 DIAGNOSIS — R52 BODY ACHES: ICD-10-CM

## 2023-02-25 LAB
INFLUENZA A ANTIBODY: NORMAL
INFLUENZA B ANTIBODY: NORMAL
S PYO AG THROAT QL: NORMAL

## 2023-02-25 RX ORDER — LOSARTAN POTASSIUM 50 MG/1
50 TABLET ORAL DAILY
COMMUNITY
Start: 2022-09-09

## 2023-02-25 RX ORDER — IPRATROPIUM BROMIDE 21 UG/1
2 SPRAY, METERED NASAL EVERY 12 HOURS
Qty: 30 ML | Refills: 3 | Status: SHIPPED | OUTPATIENT
Start: 2023-02-25

## 2023-02-25 RX ORDER — AMLODIPINE BESYLATE 5 MG/1
TABLET ORAL
COMMUNITY
Start: 2022-12-02

## 2023-02-25 RX ORDER — METHYLPREDNISOLONE 4 MG/1
TABLET ORAL
COMMUNITY
Start: 2022-12-19

## 2023-02-25 RX ORDER — HYDROCHLOROTHIAZIDE 12.5 MG/1
12.5 CAPSULE, GELATIN COATED ORAL DAILY
COMMUNITY
Start: 2022-06-10

## 2023-02-25 RX ORDER — CLONIDINE HYDROCHLORIDE 0.1 MG/1
TABLET ORAL
COMMUNITY
Start: 2022-05-31

## 2023-02-25 ASSESSMENT — ENCOUNTER SYMPTOMS
SINUS PRESSURE: 1
DIARRHEA: 0
SORE THROAT: 1
VOMITING: 0
SINUS PAIN: 0
ALLERGIC/IMMUNOLOGIC NEGATIVE: 1
NAUSEA: 0
ABDOMINAL PAIN: 0
SHORTNESS OF BREATH: 0
COUGH: 0
EYE PAIN: 0

## 2023-02-25 NOTE — PATIENT INSTRUCTIONS
Respiratory panel pending, will call once results are available. Nasal spray sent to the pharmacy, take as directed. Continue OTC oral antihistamine and decongestant. Alternate Tylenol/Motrin as needed for fever. Rest.  Increase fluids. Cool mist humidifier. Follow up with PCP or return to the clinic if symptoms worsen or fail to improve.

## 2023-02-25 NOTE — PROGRESS NOTES
[address]  [address] [address] [place] [address]  Dept: [phone]  Dept Fax: [phone]  Loc: [phone]    Anupama Barajas is a 46 y.o. female who presents today for her medical conditions/complaints as noted below. Anupama Barajas is c/o of Pharyngitis, Congestion, Otalgia, Generalized Body Aches, and Fever        HPI:       Anupama Barajas presents with complaints of sore throat, congestion, right ear pain, body aches, and low grade fever. Symptoms began this morning. OTC treatment includes Tylenol, oral antihistamine and decongestant. Denies recent antibiotics and steroids. Denies recent covid19 infection. Vaccinated against CYTVS03. Denies any exposure to COVID.  has been sick with the same symptoms.      Past Medical History:   Diagnosis Date    Hypothyroidism      Past Surgical History:   Procedure Laterality Date    CHOLECYSTECTOMY      COLONOSCOPY N/A 11/04/2021    Dr Keturah Goncalves, 10 yr recall    PANCREATECTOMY      UPPER GASTROINTESTINAL ENDOSCOPY N/A 11/04/2021    Dr Blair Ocampo-Gastritis, no h pylori       Family History   Problem Relation Age of Onset    Colon Cancer Neg Hx     Colon Polyps Neg Hx     Esophageal Cancer Neg Hx     Liver Cancer Neg Hx     Rectal Cancer Neg Hx     Stomach Cancer Neg Hx        Social History     Tobacco Use    Smoking status: Never    Smokeless tobacco: Never   Substance Use Topics    Alcohol use: Yes     Comment: occasional wine       Current Outpatient Medications   Medication Sig Dispense Refill    amLODIPine (NORVASC) 5 MG tablet TAKE 1 TABLET BY MOUTH EVERY DAY      hydroCHLOROthiazide (MICROZIDE) 12.5 MG capsule Take 12.5 mg by mouth daily      cloNIDine (CATAPRES) 0.1 MG tablet       methylPREDNISolone (MEDROL DOSEPACK) 4 MG tablet TAKE AS DIRECTED      losartan (COZAAR) 50 MG tablet Take 50 mg by mouth daily      ipratropium (ATROVENT) 0.03 % nasal spray 2 sprays by Each Nostril route in the morning and 2 sprays in the evening. 30 mL 3    Cholecalciferol (VITAMIN D3 GUMMIES ADULT PO) Take 50 mcg by mouth daily       UNABLE TO FIND daily vital proteins collegin peplides       liothyronine (CYTOMEL) 5 MCG tablet TAKE 1 TABLET BY MOUTH DAILY      Turmeric 1053 MG TABS Take by mouth daily Take half tablet daily      levothyroxine (SYNTHROID) 150 MCG tablet Take by mouth Daily       amphetamine-dextroamphetamine (ADDERALL) 10 MG tablet Take 10 mg by mouth daily as needed. 5 days a week M-F      sertraline (ZOLOFT) 50 MG tablet TAKE 1/2 TABLET BY MOUTH DAILY FOR 7 DAYS, THEN 1 TABLET DAILY  5    PIRMELLA 7/7/7 0.5/0.75/1-35 MG-MCG per tablet daily   11    RABEprazole (ACIPHEX) 20 MG tablet Take by mouth daily       PEG-KCl-NaCl-NaSulf-Na Asc-C (PLENVU) 140 g SOLR PT HAS INSTRUCTIONS (Patient not taking: Reported on 2/25/2023) 1 each 0    Milk Thistle 1000 MG CAPS Take by mouth daily  (Patient not taking: Reported on 2/25/2023)      UNABLE TO FIND 250 mg daily relora  (Patient not taking: Reported on 2/25/2023)       No current facility-administered medications for this visit. Allergies   Allergen Reactions    Statins Myalgia    5-Alpha Reductase Inhibitors     Zetia [Ezetimibe]     Bactrim [Sulfamethoxazole-Trimethoprim] Rash       Health Maintenance   Topic Date Due    Depression Screen  Never done    HIV screen  Never done    DTaP/Tdap/Td vaccine (1 - Tdap) Never done    Cervical cancer screen  Never done    Lipids  Never done    COVID-19 Vaccine (3 - Booster for Pfizer series) 03/10/2021    Flu vaccine (1) 08/01/2022    Breast cancer screen  Never done    Shingles vaccine (1 of 2) Never done    Colorectal Cancer Screen  11/04/2031    Hepatitis C screen  Completed    Hepatitis A vaccine  Aged Out    Hib vaccine  Aged Out    Meningococcal (ACWY) vaccine  Aged Out    Pneumococcal 0-64 years Vaccine  Aged Out       Subjective:     Review of Systems   Constitutional:  Positive for fever.  Negative for chills and fatigue. HENT:  Positive for congestion, ear pain (right), sinus pressure and sore throat. Negative for postnasal drip and sinus pain. Eyes:  Negative for pain and visual disturbance. Respiratory:  Negative for cough and shortness of breath. Cardiovascular:  Negative for chest pain. Gastrointestinal:  Negative for abdominal pain, diarrhea, nausea and vomiting. Endocrine: Negative for cold intolerance and heat intolerance. Genitourinary:  Negative for frequency, hematuria and urgency. Musculoskeletal:  Positive for myalgias. Skin:  Negative for rash. Allergic/Immunologic: Negative. Neurological:  Negative for weakness, light-headedness and headaches. Hematological: Negative. Psychiatric/Behavioral: Negative.       :Objective      Physical Exam  Constitutional:       Appearance: She is ill-appearing. HENT:      Head: Normocephalic and atraumatic. Right Ear: Ear canal and external ear normal. A middle ear effusion is present. Left Ear: Tympanic membrane, ear canal and external ear normal.      Ears:      Comments: Serous fluid behind right TM     Nose: Congestion present. Right Turbinates: Not swollen or pale. Left Turbinates: Not swollen or pale. Right Sinus: No maxillary sinus tenderness or frontal sinus tenderness. Left Sinus: No maxillary sinus tenderness or frontal sinus tenderness. Mouth/Throat:      Mouth: Mucous membranes are moist.      Pharynx: Posterior oropharyngeal erythema (mild) present. Comments: Postnasal drainage noted  Eyes:      General:         Right eye: No discharge. Left eye: No discharge. Conjunctiva/sclera: Conjunctivae normal.   Cardiovascular:      Rate and Rhythm: Normal rate and regular rhythm. Pulmonary:      Effort: Pulmonary effort is normal. No respiratory distress. Breath sounds: Normal breath sounds. Abdominal:      General: Abdomen is flat. Palpations: Abdomen is soft. Musculoskeletal:         General: Normal range of motion. Cervical back: Normal range of motion. Lymphadenopathy:      Cervical: No cervical adenopathy. Skin:     General: Skin is warm and dry. Capillary Refill: Capillary refill takes less than 2 seconds. Neurological:      General: No focal deficit present. Mental Status: She is alert. Psychiatric:         Mood and Affect: Mood normal.     /78 (Site: Left Lower Arm, Position: Sitting, Cuff Size: Medium Adult)   Pulse 94   Temp 99 °F (37.2 °C)   Resp 18   Ht 5' 6\" (1.676 m)   Wt 255 lb (115.7 kg)   SpO2 97%   BMI 41.16 kg/m²     :Assessment       Diagnosis Orders   1. Viral upper respiratory tract infection  ipratropium (ATROVENT) 0.03 % nasal spray      2. Screening for viral disease  Respiratory Panel, Molecular, with COVID-19 (Restricted: peds pts or suitable admitted adults)      3. Fever, unspecified fever cause  POCT rapid strep A    POCT Influenza A/B      4. Body aches  POCT Influenza A/B          :Plan   Respiratory panel pending, will call once results are available. Nasal spray sent to the pharmacy, take as directed. Continue OTC oral antihistamine and decongestant. Alternate Tylenol/Motrin as needed for fever. Rest.  Increase fluids. Cool mist humidifier. Return precautions and home care education completed. Patient verbalized understanding. Orders Placed This Encounter   Procedures    Respiratory Panel, Molecular, with COVID-19 (Restricted: peds pts or suitable admitted adults)     Order Specific Question:   Is this test for diagnosis or screening? Answer:   Screening     Order Specific Question:   Symptomatic for COVID-19 as defined by CDC? Answer:   No     Order Specific Question:   Date of Symptom Onset     Answer:   N/A     Order Specific Question:   Hospitalized for COVID-19? Answer:   No     Order Specific Question:   Admitted to ICU for COVID-19?      Answer:   No     Order Specific Question:   Employed in healthcare setting? Answer:   No     Order Specific Question:   Resident in a congregate (group) care setting? Answer:   No     Order Specific Question:   Pregnant? Answer:   No     Order Specific Question:   Previously tested for COVID-19? Answer:   No    POCT rapid strep A    POCT Influenza A/B       Results for orders placed or performed in visit on 23   POCT rapid strep A   Result Value Ref Range    Strep A Ag None Detected None Detected   POCT Influenza A/B   Result Value Ref Range    Influenza A Ab neg     Influenza B Ab neg        Return if symptoms worsen or fail to improve. Orders Placed This Encounter   Medications    ipratropium (ATROVENT) 0.03 % nasal spray     Si sprays by Each Nostril route in the morning and 2 sprays in the evening. Dispense:  30 mL     Refill:  3         Patient given educational materials- see patient instructions. Discussed use, benefit, and side effects of prescribed medications. All patient questions answered. Pt voiced understanding. Patient Instructions   Respiratory panel pending, will call once results are available. Nasal spray sent to the pharmacy, take as directed. Continue OTC oral antihistamine and decongestant. Alternate Tylenol/Motrin as needed for fever. Rest.  Increase fluids. Cool mist humidifier. Follow up with PCP or return to the clinic if symptoms worsen or fail to improve.       Electronically signed by ANDREW Baker CNP on 2023 at 4:21 PM

## 2023-02-25 NOTE — LETTER
[facility]  [address] [address]  Phone: [phone]  Fax: ANDREW [place] CNP        February 26, 2023     Patient: Rodolfo Conley   YOB: 1975   Date of Visit: 2/25/2023       To Whom it May Concern:    Ulysses Luster was seen in my clinic on 2/25/2023. She may return to work on 03/06/2023. If you have any questions or concerns, please don't hesitate to call.     Sincerely,         ANDREW Engle CNP

## 2023-02-26 ENCOUNTER — TELEPHONE (OUTPATIENT)
Age: 48
End: 2023-02-26

## 2023-02-26 DIAGNOSIS — U07.1 COVID-19 VIRUS INFECTION: Primary | ICD-10-CM

## 2023-02-26 LAB
ADENOVIRUS BY PCR: NOT DETECTED
BORDETELLA PARAPERTUSSIS BY PCR: NOT DETECTED
BORDETELLA PERTUSSIS BY PCR: NOT DETECTED
CHLAMYDOPHILIA PNEUMONIAE BY PCR: NOT DETECTED
CORONAVIRUS 229E BY PCR: NOT DETECTED
CORONAVIRUS HKU1 BY PCR: NOT DETECTED
CORONAVIRUS NL63 BY PCR: NOT DETECTED
CORONAVIRUS OC43 BY PCR: NOT DETECTED
HUMAN METAPNEUMOVIRUS BY PCR: NOT DETECTED
HUMAN RHINOVIRUS/ENTEROVIRUS BY PCR: NOT DETECTED
INFLUENZA A BY PCR: NOT DETECTED
INFLUENZA B BY PCR: NOT DETECTED
MYCOPLASMA PNEUMONIAE BY PCR: NOT DETECTED
PARAINFLUENZA VIRUS 1 BY PCR: NOT DETECTED
PARAINFLUENZA VIRUS 2 BY PCR: NOT DETECTED
PARAINFLUENZA VIRUS 3 BY PCR: NOT DETECTED
PARAINFLUENZA VIRUS 4 BY PCR: NOT DETECTED
RESPIRATORY SYNCYTIAL VIRUS BY PCR: NOT DETECTED
SARS-COV-2, PCR: DETECTED

## 2023-02-26 NOTE — TELEPHONE ENCOUNTER
Respiratory panel was positive for COVID-19. Provider contacted patient to notify her of her results. Quarantine x 5 days. May return to work on Monday 3/6/23.   Increase fluids with gatorade  Take tylenol/motrin as needed for fever/body aches  Take antihistamine, decongestant, flonase as needed as discussed  Follow up if symptoms worsen or fail to improve: SOB, not able to urinate, extreme fatigue

## 2023-03-10 ENCOUNTER — OFFICE VISIT (OUTPATIENT)
Dept: CARDIOLOGY | Facility: CLINIC | Age: 48
End: 2023-03-10
Payer: COMMERCIAL

## 2023-03-10 VITALS
WEIGHT: 253 LBS | SYSTOLIC BLOOD PRESSURE: 136 MMHG | BODY MASS INDEX: 40.66 KG/M2 | HEART RATE: 90 BPM | DIASTOLIC BLOOD PRESSURE: 84 MMHG | HEIGHT: 66 IN

## 2023-03-10 DIAGNOSIS — I10 PRIMARY HYPERTENSION: Primary | ICD-10-CM

## 2023-03-10 DIAGNOSIS — Q23.1 BICUSPID AORTIC VALVE: ICD-10-CM

## 2023-03-10 DIAGNOSIS — E66.01 MORBID OBESITY WITH BMI OF 40.0-44.9, ADULT: ICD-10-CM

## 2023-03-10 DIAGNOSIS — E78.5 HYPERLIPIDEMIA, UNSPECIFIED HYPERLIPIDEMIA TYPE: ICD-10-CM

## 2023-03-10 DIAGNOSIS — R06.09 DOE (DYSPNEA ON EXERTION): ICD-10-CM

## 2023-03-10 DIAGNOSIS — Z82.49 FAMILY HISTORY OF EARLY CAD: ICD-10-CM

## 2023-03-10 DIAGNOSIS — R29.818 SUSPECTED SLEEP APNEA: ICD-10-CM

## 2023-03-10 PROBLEM — R07.9 CHEST PAIN IN ADULT: Status: RESOLVED | Noted: 2022-06-10 | Resolved: 2023-03-10

## 2023-03-10 PROCEDURE — 93000 ELECTROCARDIOGRAM COMPLETE: CPT | Performed by: INTERNAL MEDICINE

## 2023-03-10 PROCEDURE — 99214 OFFICE O/P EST MOD 30 MIN: CPT | Performed by: INTERNAL MEDICINE

## 2023-03-10 NOTE — PROGRESS NOTES
Aliza Garcia  [id number]  1975  48 y.o.  female    Referring Provider: Aliza Panda DO    Reason for  Visit:      Here for routine follow up   Initial visit for chest pain and shortness of breath   Elevated BP   coronary CT angiography report as below        Subjective    Overall feels the same   No new events or complaints since last visit   Overall the patient feels no major change from baseline symptoms   Similar symptoms as during last visit      No further chest pain   Mild chronic exertional shortness of breath on exertion relieved with rest  No significant cough or wheezing    No palpitations  No associated chest pain  No significant pedal edema    No fever or chills  No significant expectoration    No hemoptysis  No presyncope or syncope    Tolerating current medications well with no untoward side effects   Compliant with prescribed medication regimen. Tries to adhere to cardiac diet.     Now more active and no chest pain       History of present illness:  Aliza Garcia is a 48 y.o. yo female with essential hypertension   who presents today for   Chief Complaint   Patient presents with   • Shortness of Breath     6 mo f/u   .    History  Past Medical History:   Diagnosis Date   • Abnormal ECG    • Acid reflux    • Arrhythmia    • Bicuspid aortic valve 7/22/2022   • Disease of thyroid gland    • Hyperlipidemia    • Primary hypertension 06/10/2022   ,   Past Surgical History:   Procedure Laterality Date   • APPENDECTOMY N/A 05/14/2018    Procedure: APPENDECTOMY LAPAROSCOPIC;  Surgeon: Ange Dominguez MD;  Location: [facility];  Service: General   • CHOLECYSTECTOMY     ,   Family History   Problem Relation Age of Onset   • Hypertension Mother    • Diabetes Mother    • Hypothyroidism Mother    • Heart disease Father    • Hypertension Father    • Diabetes Father    • Atrial fibrillation Father    • Arrhythmia Father         A Fib   • Hyperlipidemia Father    • No Known Problems Sister    •  No Known Problems Brother    • No Known Problems Daughter    • No Known Problems Son    • No Known Problems Maternal Aunt    • No Known Problems Paternal Aunt    • No Known Problems Maternal Grandmother    • Cancer Maternal Grandfather    • No Known Problems Paternal Grandmother    • Cancer Paternal Grandfather    • No Known Problems Other    • Breast cancer Neg Hx    • BRCA 1/2 Neg Hx    • Colon cancer Neg Hx    • Endometrial cancer Neg Hx    • Ovarian cancer Neg Hx    ,   Social History     Tobacco Use   • Smoking status: Never   • Smokeless tobacco: Never   Vaping Use   • Vaping Use: Never used   Substance Use Topics   • Alcohol use: Yes     Comment: Occ drink, special occasions   • Drug use: Never   ,     Medications  Current Outpatient Medications   Medication Sig Dispense Refill   • amLODIPine (NORVASC) 5 MG tablet TAKE 1 TABLET BY MOUTH EVERY DAY 30 tablet 11   • amphetamine-dextroamphetamine (ADDERALL) 10 MG tablet Take 1 tablet by mouth Daily.     • aspirin 81 MG EC tablet Take 1 tablet by mouth Daily.     • cloNIDine (CATAPRES) 0.1 MG tablet As Needed.     • hydroCHLOROthiazide (MICROZIDE) 12.5 MG capsule Take 1 capsule by mouth Daily. 90 capsule 3   • liothyronine (CYTOMEL) 5 MCG tablet Take 1 tablet by mouth Daily.     • losartan (Cozaar) 50 MG tablet Take 1 tablet by mouth Daily. 90 tablet 3   • metoprolol succinate XL (TOPROL-XL) 100 MG 24 hr tablet Take 1 tablet by mouth Daily. 30 tablet 11   • Milk Thistle 1000 MG capsule Take  by mouth.     • norethindrone-ethinyl estradiol 0.5/0.75/1-35 MG-MCG per tablet Take 1 tablet by mouth Daily.     • RABEprazole Sodium (ACIPHEX PO) Take  by mouth Every Night.     • sertraline (ZOLOFT) 25 MG tablet Take 1 tablet by mouth Daily.     • Synthroid 150 MCG tablet Take 1 tablet by mouth Daily.     • Turmeric 1053 MG tablet Take  by mouth.     • Evolocumab (REPATHA) solution prefilled syringe injection Inject 1 mL under the skin into the appropriate area as directed  "Every 14 (Fourteen) Days for 6 doses. 1 mL 3     No current facility-administered medications for this visit.       Allergies:  Statins and Bactrim [sulfamethoxazole-trimethoprim]    Review of Systems  Review of Systems   Constitutional: Negative.   HENT: Negative.    Eyes: Negative.    Cardiovascular: Positive for dyspnea on exertion. Negative for chest pain, claudication, cyanosis, irregular heartbeat, leg swelling, near-syncope, orthopnea, palpitations, paroxysmal nocturnal dyspnea and syncope.   Respiratory: Negative.    Endocrine: Negative.    Hematologic/Lymphatic: Negative.    Skin: Negative.    Gastrointestinal: Negative for anorexia.   Genitourinary: Negative.    Neurological: Negative.    Psychiatric/Behavioral: Negative.        Objective     Physical Exam:  /84   Pulse 90   Ht 167.6 cm (66\")   Wt 115 kg (253 lb)   BMI 40.84 kg/m²     Physical Exam  Constitutional:       Appearance: Normal appearance.   HENT:      Head: Normocephalic.   Eyes:      General: Lids are normal.   Neck:      Vascular: No carotid bruit.   Cardiovascular:      Rate and Rhythm: Regular rhythm.      Heart sounds: S1 normal and S2 normal. Murmur heard.    Systolic murmur is present with a grade of 2/6.  Pulmonary:      Effort: Pulmonary effort is normal.   Abdominal:      Palpations: Abdomen is soft.   Neurological:      Mental Status: She is alert.   Psychiatric:         Speech: Speech normal.         Behavior: Behavior normal.         Thought Content: Thought content normal.         Results Review:    Cardiac CT angiography  6/30/2022     Impression:      Total calcium score (using PARKER calcium score calculator): LAD 5.7, total 5.7  This is elevated and at the 90 th percentile for matched population        Coronary angiography:  Normal left main coronary artery  No obstructive disease of the left anterior descending coronary artery or diagonal branches  Normal small left circumflex coronary artery  Ostium of small obtuse " marginal branch not well visualized but do not suspect any obstructive disease  Normal right coronary artery and its branches  Overall no evidence of any obstructive coronary artery disease identified in any of the visualized epicardial vessels     ___________________________________________________________________________     Recommendations:     Recommend continuing on aspirin therapy  Consider statin therapy if tolerated with target LDL well below 70 mg/dL  Ongoing risk factor modifications  Further evaluation if ongoing chest pain or high risk of suspicion of significant ischemic heart disease         Aliza Garcia  Complete Transthoracic Echocardiogram with Complete Doppler and Color Flow  Order# [id number]  Ordering physician: Osvaldo Lozano MD Study date: 7/15/22       Patient Information    Patient Name   Aliza Garcia MRN   [id number] Legal Sex   Female  (Age)   1975 (47 y.o.)      [facility] PACS Images     Show images for Adult Transthoracic Echo Complete w/ Color, Spectral and Contrast if necessary per protocol     Sedation Narrator Report    Sedation Narrator Report     Interpretation Summary    · Left ventricular ejection fraction appears to be 56 - 60%. Left ventricular systolic function is normal.  · Left ventricular diastolic function was normal.  · A bicuspid aortic valve is suggested.  · There is calcification of the aortic valve.  · Estimated right ventricular systolic pressure from tricuspid regurgitation is normal (<35 mmHg).         Results for orders placed during the hospital encounter of 07/15/22    Adult Transthoracic Echo Complete w/ Color, Spectral and Contrast if necessary per protocol    Interpretation Summary  · Left ventricular ejection fraction appears to be 56 - 60%. Left ventricular systolic function is normal.  · Left ventricular diastolic function was normal.  · Abnormal global longitudinal LV strain (GLS) = -14.0%  · A bicuspid aortic valve is suggested.  · Mild aortic  valve stenosis is present. Mild aortic regurgitation  · Estimated right ventricular systolic pressure from tricuspid regurgitation is normal (<35 mmHg).  · Report delayed as patient called back for additional imaging and subsequent processing of imaging and availability for reporting       AORTA:   Peak systolic velocity of 93.83 cm/s with a normal waveform.     OTHER FINDINGS: Urinary bladder appears unremarkable.     IMPRESSION:  1. Normal grayscale appearance of bilateral kidneys.   2. No Doppler evidence of renal artery stenosis.     This report was finalized on 2022 14:57 by Dr. Bennett Diaz MD.    Aliza Garcia  Stress test only, exercise  Order# [id number]  Reading physician: John Diaz MD Ordering physician: Aliza Panda DO Study date: 22       Patient Information    Patient Name   Aliza Garcia MRN   [id number] Legal Sex   Female  (Age)   1975 (47 y.o.)         Interpretation Summary       · The patient reported chest pain and shortness of breath during the stress test.     EQUIVOCAL RISK FOR ISCHEMIA - CONSIDER REPEAT STUDY WITH IMAGING        ____________________________________________________________________________________________________________________________________________  Health maintenance and recommendations    Low salt/ HTN/ Heart healthy carbohydrate restricted cardiac diet   The patient is advised to reduce or avoid caffeine or other cardiac stimulants.   Minimize or avoid  NSAID-type medications      Monitor for any signs of bleeding including red or dark stools. Fall precautions.   Advised staying uptodate with immunizations per established standard guidelines.    Offered to give patient  a copy of my notes     Questions were encouraged, asked and answered to the patient's  understanding and satisfaction. Questions if any regarding current medications and side effects, need for refills and importance of compliance to medications  stressed.    Reviewed available prior notes, consults, prior visits, laboratory findings, radiology and cardiology relevant reports. Updated chart as applicable. I have reviewed the patient's medical history in detail and updated the computerized patient record as relevant.      Updated patient regarding any new or relevant abnormalities on review of records or any new findings on physical exam. Mentioned to patient about purpose of visit and desirable health short and long term goals and objectives.    Primary to monitor CBC CMP Lipid panel and TSH as applicable    ___________________________________________________________________________________________________________________________________________     ECG 12 Lead    Date/Time: 3/10/2023 8:49 AM  Performed by: Osvaldo Lozano MD  Authorized by: Osvaldo Lozano MD   Comparison: compared with previous ECG from 5/31/2022  Comparison to previous ECG: Ventricular rate changed from 83  to 90  beats per minute    Rhythm: sinus rhythm  Rate: normal  ST Segments: ST segments normal  T Waves: T waves normal  QRS axis: normal  Other: no other findings    Clinical impression: abnormal EKG            Assessment & Plan   Diagnoses and all orders for this visit:    1. Primary hypertension (Primary)    2. Hyperlipidemia, unspecified hyperlipidemia type    3. Suspected sleep apnea    4. Family history of early CAD    5. HERNANDEZ (dyspnea on exertion)    6. Morbid obesity with BMI of 40.0-44.9, adult (HCC)    7. Bicuspid aortic valve    Other orders  -     ECG 12 Lead  -     Evolocumab (REPATHA) solution prefilled syringe injection; Inject 1 mL under the skin into the appropriate area as directed Every 14 (Fourteen) Days for 6 doses.  Dispense: 1 mL; Refill: 3          Plan    Overall doing well no new cardiovascular symptoms and therefore no additional cardiac testing is required prior to next visit  If any interim issues arise will call me for further evaluation.     Check BP and heart  rates twice daily initially till blood pressures and heart rates under good control and then at least 3x / week,   If blood pressures continue to be well-controlled then can check week a month  at home and bring a recording for review next visit  If BP >130/85 or < 100/60 persistently over 3 reading 30 mins apart or if heart rates persistently above 100 bpm or less than 55 bpm call sooner for evaluation and advise       Patient expressed understanding  Encouraged and answered all questions   Discussed with the patient and all questioned fully answered. She will call me if any problems arise.   Discussed results of prior testing with patient : echo and cardiac CTA as well as ECG from today     Intolerant to statin   Consider injectable lipid lowering agents in future if LDL not at goal   Keep LDL below 70 mg/dl. Monitor liver and renal functions.   Monitor CBC, CMP, TSH (as indicated) and Lipid Panel by primary     Patient was advised to continue CPAP daily.     Unable to afford Repatha and insurance would not pay   Hopefully insurance will see the benefit of this and approve it   Denial of Repatha is denial of the right care for her and increases her of risk of heart attack, stroke etc and insurance would be liable for this     Severely intolerant to Statins           Return in about 6 months (around 9/10/2023).

## 2023-03-21 ENCOUNTER — TELEPHONE (OUTPATIENT)
Dept: CARDIOLOGY | Facility: CLINIC | Age: 48
End: 2023-03-21
Payer: COMMERCIAL

## 2023-03-21 RX ORDER — EZETIMIBE 10 MG/1
10 TABLET ORAL DAILY
Qty: 30 TABLET | Refills: 11 | Status: SHIPPED | OUTPATIENT
Start: 2023-03-21 | End: 2023-03-24 | Stop reason: SDUPTHER

## 2023-03-21 NOTE — TELEPHONE ENCOUNTER
PATIENT WAS DENIED REPATHA INJECTIONS.  I HAVE CALLED TO SEE WHAT STATINS SHE HAS TRIED WITH NO ANSWER.     PLEASE ADVISE.

## 2023-03-22 NOTE — TELEPHONE ENCOUNTER
I've left patient a detailed message advising her that we have sent Zetia to her pharmacy due to insurance denying repatha. Patient was advised to call us with any further questions or concerns.    WF

## 2023-03-24 RX ORDER — EZETIMIBE 10 MG/1
10 TABLET ORAL DAILY
Qty: 30 TABLET | Refills: 11 | Status: SHIPPED | OUTPATIENT
Start: 2023-03-24

## 2023-04-11 ENCOUNTER — TRANSCRIBE ORDERS (OUTPATIENT)
Dept: ADMINISTRATIVE | Facility: HOSPITAL | Age: 48
End: 2023-04-11
Payer: COMMERCIAL

## 2023-04-11 DIAGNOSIS — Z12.31 VISIT FOR SCREENING MAMMOGRAM: Primary | ICD-10-CM

## 2023-04-18 ENCOUNTER — HOSPITAL ENCOUNTER (OUTPATIENT)
Dept: MAMMOGRAPHY | Facility: HOSPITAL | Age: 48
Discharge: HOME | End: 2023-04-18
Admitting: FAMILY MEDICINE
Payer: COMMERCIAL

## 2023-04-18 DIAGNOSIS — Z12.31 VISIT FOR SCREENING MAMMOGRAM: ICD-10-CM

## 2023-04-18 PROCEDURE — 77063 BREAST TOMOSYNTHESIS BI: CPT

## 2023-04-18 PROCEDURE — 77067 SCR MAMMO BI INCL CAD: CPT

## 2023-04-21 ENCOUNTER — OFFICE VISIT (OUTPATIENT)
Dept: NEUROLOGY | Facility: CLINIC | Age: 48
End: 2023-04-21
Payer: COMMERCIAL

## 2023-04-21 VITALS
WEIGHT: 260 LBS | BODY MASS INDEX: 41.78 KG/M2 | SYSTOLIC BLOOD PRESSURE: 140 MMHG | OXYGEN SATURATION: 98 % | HEIGHT: 66 IN | HEART RATE: 80 BPM | DIASTOLIC BLOOD PRESSURE: 80 MMHG

## 2023-04-21 DIAGNOSIS — G47.33 OBSTRUCTIVE SLEEP APNEA: Primary | ICD-10-CM

## 2023-04-21 NOTE — PROGRESS NOTES
Neurology consultation note    Chief Complaint:    Suspected Obstructive Sleep Apnea    Subjective   History of Present Illness:  Aliza Garcia is a 48 y.o. female who presents today for obstructive sleep apnea.  She is routinely followed by Aliza Panda DO for primary care.     Obstructive sleep apnea  Since being seen she has been diagnosed with sleep apnea after having home sleep study on 11/4/2022 which revealed an AHI of 20.7.  She was placed on APAP and reports that she is doing well.  She states less daytime hypersomnolence and reports that she is tolerating the machine at night well.  She denies any restless sleep, nonrestorative sleep, or snoring or therapy.  She reports compliance with therapy.  Allergies:    Statins and Bactrim [sulfamethoxazole-trimethoprim]    Medications:  Current Outpatient Medications   Medication Sig Dispense Refill   • amLODIPine (NORVASC) 5 MG tablet TAKE 1 TABLET BY MOUTH EVERY DAY 30 tablet 11   • amphetamine-dextroamphetamine (ADDERALL) 10 MG tablet Take 1 tablet by mouth Daily.     • aspirin 81 MG EC tablet Take 1 tablet by mouth Daily.     • cloNIDine (CATAPRES) 0.1 MG tablet As Needed.     • Evolocumab (REPATHA) solution prefilled syringe injection Inject 1 mL under the skin into the appropriate area as directed Every 14 (Fourteen) Days for 6 doses. 1 mL 3   • ezetimibe (ZETIA) 10 MG tablet Take 1 tablet by mouth Daily. 30 tablet 11   • hydroCHLOROthiazide (MICROZIDE) 12.5 MG capsule Take 1 capsule by mouth Daily. 90 capsule 3   • liothyronine (CYTOMEL) 5 MCG tablet Take 1 tablet by mouth Daily.     • losartan (Cozaar) 50 MG tablet Take 1 tablet by mouth Daily. 90 tablet 3   • metoprolol succinate XL (TOPROL-XL) 100 MG 24 hr tablet Take 1 tablet by mouth Daily. 30 tablet 11   • Milk Thistle 1000 MG capsule Take  by mouth.     • norethindrone-ethinyl estradiol 0.5/0.75/1-35 MG-MCG per tablet Take 1 tablet by mouth Daily.     • RABEprazole Sodium (ACIPHEX PO)  Take  by mouth Every Night.     • sertraline (ZOLOFT) 25 MG tablet Take 1 tablet by mouth Daily.     • Synthroid 150 MCG tablet Take 1 tablet by mouth Daily.     • Turmeric 1053 MG tablet Take  by mouth.       No current facility-administered medications for this visit.     Current outpatient and discharge medications have been reconciled for the patient.  Reviewed by: SOBIA Watt    Past Medical History:  Past Medical History:   Diagnosis Date   • Abnormal ECG    • Acid reflux    • Arrhythmia    • Bicuspid aortic valve 7/22/2022   • Disease of thyroid gland    • Hyperlipidemia    • Primary hypertension 06/10/2022     Past Surgical History:   Procedure Laterality Date   • APPENDECTOMY N/A 05/14/2018    Procedure: APPENDECTOMY LAPAROSCOPIC;  Surgeon: Ange Dominguez MD;  Location: [facility];  Service: General   • CHOLECYSTECTOMY       Family History   Problem Relation Age of Onset   • Hypertension Mother    • Diabetes Mother    • Hypothyroidism Mother    • Heart disease Father    • Hypertension Father    • Diabetes Father    • Atrial fibrillation Father    • Arrhythmia Father         A Fib   • Hyperlipidemia Father    • Breast cancer Sister    • No Known Problems Brother    • No Known Problems Daughter    • No Known Problems Son    • No Known Problems Maternal Grandmother    • No Known Problems Paternal Grandmother    • No Known Problems Maternal Aunt    • No Known Problems Paternal Aunt    • Cancer Maternal Grandfather    • Cancer Paternal Grandfather    • No Known Problems Other    • BRCA 1/2 Neg Hx    • Colon cancer Neg Hx    • Endometrial cancer Neg Hx    • Ovarian cancer Neg Hx      Social History     Tobacco Use   • Smoking status: Never   • Smokeless tobacco: Never   Vaping Use   • Vaping Use: Never used   Substance Use Topics   • Alcohol use: Yes     Comment: Occ drink, special occasions   • Drug use: Never     Review of Systems   Psychiatric/Behavioral: Positive for sleep disturbance.   All other  "systems reviewed and are negative.        Objective   Vital Signs:  Heart Rate:  [80] 80  BP: (140)/(80) 140/80      04/21/23  1447   Weight: 118 kg (260 lb)     167.6 cm (66\")  Body mass index is 41.97 kg/m².    Physical Exam  Vitals reviewed.   Constitutional:       Appearance: Normal appearance.   HENT:      Head: Normocephalic.   Eyes:      General: Lids are normal.      Extraocular Movements: Extraocular movements intact.      Pupils: Pupils are equal, round, and reactive to light.   Cardiovascular:      Rate and Rhythm: Normal rate and regular rhythm.      Pulses: Normal pulses.   Pulmonary:      Effort: Pulmonary effort is normal.   Musculoskeletal:         General: Normal range of motion.      Cervical back: Normal range of motion and neck supple.   Skin:     General: Skin is warm and dry.      Capillary Refill: Capillary refill takes less than 2 seconds.   Neurological:      Motor: Motor strength is normal.      Coordination: Coordination is intact.      Gait: Gait is intact.      Deep Tendon Reflexes: Reflexes are normal and symmetric.   Psychiatric:         Mood and Affect: Mood normal.         Speech: Speech normal.     Neurological Exam  Mental Status  Awake, alert and oriented to person, place and time. Recent and remote memory are intact. Speech is normal. Language is fluent with no aphasia. Attention and concentration are normal.    Cranial Nerves  CN II: Visual acuity is normal. Visual fields full to confrontation.  CN III, IV, VI: Extraocular movements intact bilaterally. Normal lids and orbits bilaterally. Pupils equal round and reactive to light bilaterally.  CN V: Facial sensation is normal.  CN VII: Full and symmetric facial movement.  CN IX, X: Palate elevates symmetrically. Normal gag reflex.  CN XI: Shoulder shrug strength is normal.  CN XII: Tongue midline without atrophy or fasciculations.    Motor   Strength is 5/5 throughout all four extremities.    Sensory  Sensation is intact to light " touch, pinprick, vibration and proprioception in all four extremities.    Reflexes  Deep tendon reflexes are 2+ and symmetric in all four extremities.    Coordination    Finger-to-nose, rapid alternating movements and heel-to-shin normal bilaterally without dysmetria.    Gait  Normal casual, toe, heel and tandem gait. Normal gait.    Neck circumference 16 inches  Mallampati score: 2    Oxford Sleepiness Scale: 10  STOP-BANG: High    Compliance report:  This report is for the dates of 3/22/2023-4/20/2023.  Patient use a device for 30/30 days for 100% compliance.  Of the states patient is advised greater than 4 hours for 28 days for 93% compliance.  Patient is currently set on APAP with pressures 8-16 cm H2O.  Current AHI 0.2.    Results Review:    Lab Results   Component Value Date    GLUCOSE 112 (H) 09/12/2022    BUN 17 09/12/2022    CREATININE 0.74 09/12/2022    EGFRIFNONA >60 10/01/2021    EGFRIFAFRI >59 10/01/2021    BCR 23.0 09/12/2022    K 3.9 09/12/2022    CO2 30.0 (H) 09/12/2022    CALCIUM 9.6 09/12/2022    ALBUMIN 4.00 09/12/2022    AST 17 09/12/2022    ALT 26 09/12/2022     Lab Results   Component Value Date    WBC 10.98 (H) 09/12/2022    HGB 13.6 09/12/2022    HCT 43.2 09/12/2022    MCV 90.8 09/12/2022     09/12/2022     Lab Results   Component Value Date    CHOL 240 (H) 09/12/2022    TRIG 167 (H) 09/12/2022    HDL 40 09/12/2022     (H) 09/12/2022     Lab Results   Component Value Date    TSH 3.650 09/12/2022     No results found for: HGBA1C  No results found for: FOLATE  No results found for: [id number]    Chart review:  Office Visit with Osvaldo Lozano MD (06/10/2022)       Plan .  Assessment:  Aliza Garcia is a 48 y.o. female who presents with obstructive sleep apnea.  She has been placed on APAP and is doing well with this.  She reports that her symptoms have improved and states that she actually cannot sleep without the machine any longer.  She is doing well and I continue to  recommend her to use her CPAP.  She is compliant with her CPAP therapy.  Due to the changes within sleep medicine for [facility] we are going to refer her for further and continued management of her sleep apnea to [facility] neurology sleep.  She states complete understanding with this.     Plan:  • Continue CPAP.  Encouraged compliance  • Referral to sleep medicine at [facility]  • Recommend a regular sleep schedule and sleep hygiene  • Discussed risks of untreated sleep apnea  • Recommend regular physical activity and a balanced diet  • Call me with any questions or concerns.    The patient and I have discussed the plan of care and she is in full agreement at this time.     Follow-Up:  Return if symptoms worsen or fail to improve.       Jeremy Maldonado, SOBIA  04/21/23  15:40 CDT

## 2023-09-15 RX ORDER — LOSARTAN POTASSIUM 50 MG/1
TABLET ORAL
Qty: 90 TABLET | Refills: 3 | Status: SHIPPED | OUTPATIENT
Start: 2023-09-15

## 2023-09-18 ENCOUNTER — OFFICE VISIT (OUTPATIENT)
Dept: CARDIOLOGY | Facility: CLINIC | Age: 48
End: 2023-09-18
Payer: COMMERCIAL

## 2023-09-18 VITALS
WEIGHT: 251 LBS | HEART RATE: 81 BPM | HEIGHT: 66 IN | BODY MASS INDEX: 40.34 KG/M2 | SYSTOLIC BLOOD PRESSURE: 116 MMHG | DIASTOLIC BLOOD PRESSURE: 76 MMHG

## 2023-09-18 DIAGNOSIS — R06.09 DOE (DYSPNEA ON EXERTION): ICD-10-CM

## 2023-09-18 DIAGNOSIS — E66.01 MORBID OBESITY WITH BMI OF 40.0-44.9, ADULT: ICD-10-CM

## 2023-09-18 DIAGNOSIS — K21.9 GASTROESOPHAGEAL REFLUX DISEASE, UNSPECIFIED WHETHER ESOPHAGITIS PRESENT: ICD-10-CM

## 2023-09-18 DIAGNOSIS — R29.818 SUSPECTED SLEEP APNEA: ICD-10-CM

## 2023-09-18 DIAGNOSIS — I10 PRIMARY HYPERTENSION: ICD-10-CM

## 2023-09-18 DIAGNOSIS — Z82.49 FAMILY HISTORY OF EARLY CAD: ICD-10-CM

## 2023-09-18 DIAGNOSIS — Q23.1 BICUSPID AORTIC VALVE: Primary | ICD-10-CM

## 2023-09-18 DIAGNOSIS — E78.5 HYPERLIPIDEMIA, UNSPECIFIED HYPERLIPIDEMIA TYPE: ICD-10-CM

## 2023-09-18 PROCEDURE — 93000 ELECTROCARDIOGRAM COMPLETE: CPT | Performed by: INTERNAL MEDICINE

## 2023-09-18 PROCEDURE — 99214 OFFICE O/P EST MOD 30 MIN: CPT | Performed by: INTERNAL MEDICINE

## 2023-09-18 RX ORDER — SEMAGLUTIDE 0.68 MG/ML
INJECTION, SOLUTION SUBCUTANEOUS
COMMUNITY
Start: 2023-08-22

## 2023-09-18 NOTE — PROGRESS NOTES
Aliza Garcia  [id number]  1975  48 y.o.  female    Referring Provider: Aliza Panda DO    Reason for  Visit:      Here for routine follow up   Initial visit for chest pain and shortness of breath   Elevated BP   coronary CT angiography report as below        Subjective    Insurance will not pay for Repatha   Somnolence   Using CPAP     Overall feels the same   No new events or complaints since last visit   Overall the patient feels no major change from baseline symptoms   Similar symptoms as during last visit      No further chest pain   Mild chronic exertional shortness of breath on exertion relieved with rest  No significant cough or wheezing    No palpitations  No associated chest pain  No significant pedal edema    No fever or chills  No significant expectoration    No hemoptysis  No presyncope or syncope    Tolerating current medications well with no untoward side effects   Compliant with prescribed medication regimen. Tries to adhere to cardiac diet.     Now more active and no chest pain   No bleeding, excessive bruising, gait instability or fall risks        History of present illness:  Aliza Garcia is a 48 y.o. yo female with essential hypertension   who presents today for   Chief Complaint   Patient presents with    Hypertension     6 month follow up     Fatigue   .    History  Past Medical History:   Diagnosis Date    Abnormal ECG     Acid reflux     Arrhythmia     Bicuspid aortic valve 07/22/2022    Disease of thyroid gland     Hyperlipidemia     Primary hypertension 06/10/2022    Sleep apnea    ,   Past Surgical History:   Procedure Laterality Date    APPENDECTOMY N/A 05/14/2018    Procedure: APPENDECTOMY LAPAROSCOPIC;  Surgeon: Ange Dominguez MD;  Location: [facility] OR;  Service: General    CHOLECYSTECTOMY     ,   Family History   Problem Relation Age of Onset    Hypertension Mother     Diabetes Mother     Hypothyroidism Mother     Heart disease Father     Hypertension Father      Diabetes Father     Atrial fibrillation Father     Arrhythmia Father         A Fib    Hyperlipidemia Father     Breast cancer Sister     No Known Problems Brother     No Known Problems Daughter     No Known Problems Son     No Known Problems Maternal Grandmother     No Known Problems Paternal Grandmother     No Known Problems Maternal Aunt     No Known Problems Paternal Aunt     Cancer Maternal Grandfather     Cancer Paternal Grandfather     No Known Problems Other     BRCA 1/2 Neg Hx     Colon cancer Neg Hx     Endometrial cancer Neg Hx     Ovarian cancer Neg Hx    ,   Social History     Tobacco Use    Smoking status: Never    Smokeless tobacco: Never   Vaping Use    Vaping Use: Never used   Substance Use Topics    Alcohol use: Yes     Comment: Occ drink, special occasions    Drug use: Never   ,     Medications  Current Outpatient Medications   Medication Sig Dispense Refill    amLODIPine (NORVASC) 5 MG tablet TAKE 1 TABLET BY MOUTH EVERY DAY 30 tablet 11    amphetamine-dextroamphetamine (ADDERALL) 10 MG tablet Take 1 tablet by mouth Daily.      aspirin 81 MG EC tablet Take 1 tablet by mouth Daily.      cloNIDine (CATAPRES) 0.1 MG tablet As Needed.      ezetimibe (ZETIA) 10 MG tablet Take 1 tablet by mouth Daily. 30 tablet 11    hydroCHLOROthiazide (MICROZIDE) 12.5 MG capsule TAKE ONE CAPSULE BY MOUTH EVERY DAY 90 capsule 3    losartan (COZAAR) 50 MG tablet TAKE 1 TABLET BY MOUTH EVERY DAY 90 tablet 3    metoprolol succinate XL (TOPROL-XL) 100 MG 24 hr tablet Take 1 tablet by mouth Daily. 30 tablet 11    Milk Thistle 1000 MG capsule Take  by mouth.      norethindrone-ethinyl estradiol 0.5/0.75/1-35 MG-MCG per tablet Take 1 tablet by mouth Daily.      Ozempic, 0.25 or 0.5 MG/DOSE, 2 MG/3ML solution pen-injector INJECT MGS BELOW THE SKIN; 0.25MG SQ WEEKLY X 2 WEEKS THEN INCREASE 0.5MG SQ WEEKLY      RABEprazole Sodium (ACIPHEX PO) Take  by mouth Every Night.      sertraline (ZOLOFT) 25 MG tablet Take 1 tablet by  "mouth Daily.      Synthroid 150 MCG tablet Take 1 tablet by mouth Daily.      Turmeric 1053 MG tablet Take  by mouth.      liothyronine (CYTOMEL) 5 MCG tablet Take 1 tablet by mouth Daily.       No current facility-administered medications for this visit.       Allergies:  Statins, Zetia [ezetimibe], and Bactrim [sulfamethoxazole-trimethoprim]    Review of Systems  Review of Systems   Constitutional: Negative.   HENT: Negative.     Eyes: Negative.    Cardiovascular:  Positive for dyspnea on exertion. Negative for chest pain, claudication, cyanosis, irregular heartbeat, leg swelling, near-syncope, orthopnea, palpitations, paroxysmal nocturnal dyspnea and syncope.   Respiratory: Negative.     Endocrine: Negative.    Hematologic/Lymphatic: Negative.    Skin: Negative.    Gastrointestinal:  Negative for anorexia.   Genitourinary: Negative.    Neurological: Negative.    Psychiatric/Behavioral: Negative.       Objective     Physical Exam:  /76   Pulse 81   Ht 167.6 cm (66\")   Wt 114 kg (251 lb)   BMI 40.51 kg/m²     Physical Exam  Constitutional:       Appearance: Normal appearance.   HENT:      Head: Normocephalic.   Eyes:      General: Lids are normal.   Neck:      Vascular: No carotid bruit.   Cardiovascular:      Rate and Rhythm: Regular rhythm.      Heart sounds: S1 normal and S2 normal. Murmur heard.   Systolic murmur is present with a grade of 2/6.   Pulmonary:      Effort: Pulmonary effort is normal.   Abdominal:      Palpations: Abdomen is soft.   Neurological:      Mental Status: She is alert.   Psychiatric:         Speech: Speech normal.         Behavior: Behavior normal.         Thought Content: Thought content normal.       Results Review:    Cardiac CT angiography  6/30/2022     Impression:      Total calcium score (using PARKER calcium score calculator): LAD 5.7, total 5.7  This is elevated and at the 90 th percentile for matched population        Coronary angiography:  Normal left main coronary " artery  No obstructive disease of the left anterior descending coronary artery or diagonal branches  Normal small left circumflex coronary artery  Ostium of small obtuse marginal branch not well visualized but do not suspect any obstructive disease  Normal right coronary artery and its branches  Overall no evidence of any obstructive coronary artery disease identified in any of the visualized epicardial vessels     ___________________________________________________________________________     Recommendations:     Recommend continuing on aspirin therapy  Consider statin therapy if tolerated with target LDL well below 70 mg/dL  Ongoing risk factor modifications  Further evaluation if ongoing chest pain or high risk of suspicion of significant ischemic heart disease         Aliza Garcia  Complete Transthoracic Echocardiogram with Complete Doppler and Color Flow  Order# [id number]  Ordering physician: Osvaldo Lozano MD Study date: 7/15/22       Patient Information    Patient Name   Aliza Garcia MRN   [id number] Legal Sex   Female  (Age)   1975 (47 y.o.)      [facility] PACS Images     Show images for Adult Transthoracic Echo Complete w/ Color, Spectral and Contrast if necessary per protocol     Sedation Narrator Report    Sedation Narrator Report     Interpretation Summary    Left ventricular ejection fraction appears to be 56 - 60%. Left ventricular systolic function is normal.  Left ventricular diastolic function was normal.  A bicuspid aortic valve is suggested.  There is calcification of the aortic valve.  Estimated right ventricular systolic pressure from tricuspid regurgitation is normal (<35 mmHg).         Results for orders placed during the hospital encounter of 07/15/22    Adult Transthoracic Echo Complete w/ Color, Spectral and Contrast if necessary per protocol    Interpretation Summary  · Left ventricular ejection fraction appears to be 56 - 60%. Left ventricular systolic function is normal.  · Left  ventricular diastolic function was normal.  · Abnormal global longitudinal LV strain (GLS) = -14.0%  · A bicuspid aortic valve is suggested.  · Mild aortic valve stenosis is present. Mild aortic regurgitation  · Estimated right ventricular systolic pressure from tricuspid regurgitation is normal (<35 mmHg).  · Report delayed as patient called back for additional imaging and subsequent processing of imaging and availability for reporting       AORTA:   Peak systolic velocity of 93.83 cm/s with a normal waveform.     OTHER FINDINGS: Urinary bladder appears unremarkable.     IMPRESSION:  1. Normal grayscale appearance of bilateral kidneys.   2. No Doppler evidence of renal artery stenosis.     This report was finalized on 2022 14:57 by Dr. Bennett Diaz MD.    Aliza Garcia  Stress test only, exercise  Order# [id number]  Reading physician: John Diaz MD Ordering physician: Aliza Panda DO Study date: 22       Patient Information    Patient Name   Aliza Garcia MRN   [id number] Legal Sex   Female  (Age)   1975 (47 y.o.)         Interpretation Summary       The patient reported chest pain and shortness of breath during the stress test.     EQUIVOCAL RISK FOR ISCHEMIA - CONSIDER REPEAT STUDY WITH IMAGING        ____________________________________________________________________________________________________________________________________________  Health maintenance and recommendations    Low salt/ HTN/ Heart healthy carbohydrate restricted cardiac diet   The patient is advised to reduce or avoid caffeine or other cardiac stimulants.   Minimize or avoid  NSAID-type medications      Monitor for any signs of bleeding including red or dark stools. Fall precautions.   Advised staying uptodate with immunizations per established standard guidelines.    Offered to give patient  a copy of my notes     Questions were encouraged, asked and answered to the patient's  understanding  and satisfaction. Questions if any regarding current medications and side effects, need for refills and importance of compliance to medications stressed.    Reviewed available prior notes, consults, prior visits, laboratory findings, radiology and cardiology relevant reports. Updated chart as applicable. I have reviewed the patient's medical history in detail and updated the computerized patient record as relevant.      Updated patient regarding any new or relevant abnormalities on review of records or any new findings on physical exam. Mentioned to patient about purpose of visit and desirable health short and long term goals and objectives.    Primary to monitor CBC CMP Lipid panel and TSH as applicable    ___________________________________________________________________________________________________________________________________________     ECG 12 Lead    Date/Time: 9/18/2023 8:24 AM  Performed by: Osvaldo Lozano MD  Authorized by: Osvaldo Lozano MD   Comparison: compared with previous ECG from 3/10/2023  Rhythm: sinus rhythm  Rate: normal  Conduction: conduction normal  ST Segments: ST segments normal  T Waves: T waves normal  QRS axis: normal  Other: no other findings    Clinical impression: normal ECG        Assessment & Plan   Diagnoses and all orders for this visit:    1. Bicuspid aortic valve (Primary)    2. Hyperlipidemia, unspecified hyperlipidemia type    3. Primary hypertension    4. Gastroesophageal reflux disease, unspecified whether esophagitis present    5. HERNANDEZ (dyspnea on exertion)    6. Family history of early CAD    7. Morbid obesity with BMI of 40.0-44.9, adult    8. Suspected sleep apnea    Other orders  -     ECG 12 Lead          Plan      Insurance will not pay for Repatha or weight loss surgery   Unable to afford Repatha and insurance would not pay   Hopefully insurance will see the benefit of this and approve it   Denial of Repatha is denial of the right care for her and increases her of  risk of heart attack, stroke etc and insurance would be liable for this     Overall doing well no new cardiovascular symptoms and therefore no additional cardiac testing is required prior to next visit  If any interim issues arise will call me for further evaluation.     Check BP and heart rates twice daily initially till blood pressures and heart rates under good control and then at least 3x / week,   If blood pressures continue to be well-controlled then can check week a month  at home and bring a recording for review next visit  If BP >130/85 or < 100/60 persistently over 3 reading 30 mins apart or if heart rates persistently above 100 bpm or less than 55 bpm call sooner for evaluation and advise       Patient expressed understanding  Encouraged and answered all questions   Discussed with the patient and all questioned fully answered. She will call me if any problems arise.   Discussed results of prior testing with patient : echo and cardiac CTA as well as ECG from today    MDM     Amount and/or Complexity of Data Reviewed  Clinical lab tests: reviewed  Tests in the medicine section of CPT®: reviewed  Review and summarize past medical records: yes  Independent visualization of images, tracings, or specimens: yes    Risk of Complications, Morbidity, and/or Mortality  Presenting problems: moderate  Diagnostic procedures: moderate  Management options: moderate      Intolerant to statin   Consider injectable lipid lowering agents in future if LDL not at goal   Keep LDL below 70 mg/dl. Monitor liver and renal functions.   Monitor CBC, CMP, TSH (as indicated) and Lipid Panel by primary     Patient was advised to continue CPAP daily.     Severely intolerant to Statins     Bring copies or have primary fax to our office labs and other tests prior to next visit   She wants to have Dr Panda to order Lipid panel    Continue ECASA 81 mg daily regimen given risk factors and monitor for any signs of bleeding including red or  dark stools. Fall precautions.          Return in about 6 months (around 3/18/2024).

## 2024-02-06 ENCOUNTER — TRANSCRIBE ORDERS (OUTPATIENT)
Dept: ADMINISTRATIVE | Facility: HOSPITAL | Age: 49
End: 2024-02-06
Payer: COMMERCIAL

## 2024-02-06 DIAGNOSIS — H93.13 TINNITUS, BILATERAL: Primary | ICD-10-CM

## 2024-03-19 ENCOUNTER — TELEPHONE (OUTPATIENT)
Dept: CARDIOLOGY | Facility: CLINIC | Age: 49
End: 2024-03-19
Payer: COMMERCIAL

## 2024-03-22 ENCOUNTER — LAB (OUTPATIENT)
Dept: LAB | Facility: HOSPITAL | Age: 49
End: 2024-03-22
Payer: COMMERCIAL

## 2024-03-22 ENCOUNTER — OFFICE VISIT (OUTPATIENT)
Dept: CARDIOLOGY | Facility: CLINIC | Age: 49
End: 2024-03-22
Payer: COMMERCIAL

## 2024-03-22 VITALS
DIASTOLIC BLOOD PRESSURE: 83 MMHG | SYSTOLIC BLOOD PRESSURE: 131 MMHG | HEIGHT: 66 IN | BODY MASS INDEX: 39.86 KG/M2 | HEART RATE: 84 BPM | WEIGHT: 248 LBS

## 2024-03-22 DIAGNOSIS — R29.818 SUSPECTED SLEEP APNEA: ICD-10-CM

## 2024-03-22 DIAGNOSIS — E78.5 HYPERLIPIDEMIA, UNSPECIFIED HYPERLIPIDEMIA TYPE: ICD-10-CM

## 2024-03-22 DIAGNOSIS — R53.83 EASY FATIGABILITY: ICD-10-CM

## 2024-03-22 DIAGNOSIS — E66.01 MORBID OBESITY WITH BMI OF 40.0-44.9, ADULT: ICD-10-CM

## 2024-03-22 DIAGNOSIS — Q23.1 BICUSPID AORTIC VALVE: Primary | ICD-10-CM

## 2024-03-22 DIAGNOSIS — I10 PRIMARY HYPERTENSION: ICD-10-CM

## 2024-03-22 DIAGNOSIS — R06.09 DOE (DYSPNEA ON EXERTION): ICD-10-CM

## 2024-03-22 LAB
25(OH)D3 SERPL-MCNC: 52.6 NG/ML (ref 30–100)
ALBUMIN SERPL-MCNC: 3.9 G/DL (ref 3.5–5.2)
ALBUMIN/GLOB SERPL: 1 G/DL
ALP SERPL-CCNC: 109 U/L (ref 39–117)
ALT SERPL W P-5'-P-CCNC: 31 U/L (ref 1–33)
ANION GAP SERPL CALCULATED.3IONS-SCNC: 10 MMOL/L (ref 5–15)
AST SERPL-CCNC: 16 U/L (ref 1–32)
BASOPHILS # BLD AUTO: 0.04 10*3/MM3 (ref 0–0.2)
BASOPHILS NFR BLD AUTO: 0.4 % (ref 0–1.5)
BILIRUB SERPL-MCNC: 0.3 MG/DL (ref 0–1.2)
BUN SERPL-MCNC: 10 MG/DL (ref 6–20)
BUN/CREAT SERPL: 14.3 (ref 7–25)
CALCIUM SPEC-SCNC: 9.7 MG/DL (ref 8.6–10.5)
CHLORIDE SERPL-SCNC: 101 MMOL/L (ref 98–107)
CHOLEST SERPL-MCNC: 202 MG/DL (ref 0–200)
CO2 SERPL-SCNC: 28 MMOL/L (ref 22–29)
CREAT SERPL-MCNC: 0.7 MG/DL (ref 0.57–1)
DEPRECATED RDW RBC AUTO: 42.9 FL (ref 37–54)
EGFRCR SERPLBLD CKD-EPI 2021: 104.9 ML/MIN/1.73
EOSINOPHIL # BLD AUTO: 0.22 10*3/MM3 (ref 0–0.4)
EOSINOPHIL NFR BLD AUTO: 2 % (ref 0.3–6.2)
ERYTHROCYTE [DISTWIDTH] IN BLOOD BY AUTOMATED COUNT: 13.3 % (ref 12.3–15.4)
FOLATE SERPL-MCNC: 4.31 NG/ML (ref 4.78–24.2)
GLOBULIN UR ELPH-MCNC: 3.8 GM/DL
GLUCOSE SERPL-MCNC: 108 MG/DL (ref 65–99)
HCT VFR BLD AUTO: 41.2 % (ref 34–46.6)
HDLC SERPL-MCNC: 42 MG/DL (ref 40–60)
HGB BLD-MCNC: 13.4 G/DL (ref 12–15.9)
IMM GRANULOCYTES # BLD AUTO: 0.04 10*3/MM3 (ref 0–0.05)
IMM GRANULOCYTES NFR BLD AUTO: 0.4 % (ref 0–0.5)
LDLC SERPL CALC-MCNC: 136 MG/DL (ref 0–100)
LDLC/HDLC SERPL: 3.18 {RATIO}
LYMPHOCYTES # BLD AUTO: 2.25 10*3/MM3 (ref 0.7–3.1)
LYMPHOCYTES NFR BLD AUTO: 20.7 % (ref 19.6–45.3)
MCH RBC QN AUTO: 28.4 PG (ref 26.6–33)
MCHC RBC AUTO-ENTMCNC: 32.5 G/DL (ref 31.5–35.7)
MCV RBC AUTO: 87.3 FL (ref 79–97)
MONOCYTES # BLD AUTO: 0.55 10*3/MM3 (ref 0.1–0.9)
MONOCYTES NFR BLD AUTO: 5.1 % (ref 5–12)
NEUTROPHILS NFR BLD AUTO: 7.76 10*3/MM3 (ref 1.7–7)
NEUTROPHILS NFR BLD AUTO: 71.4 % (ref 42.7–76)
NRBC BLD AUTO-RTO: 0 /100 WBC (ref 0–0.2)
PLATELET # BLD AUTO: 322 10*3/MM3 (ref 140–450)
PMV BLD AUTO: 10.2 FL (ref 6–12)
POTASSIUM SERPL-SCNC: 3.9 MMOL/L (ref 3.5–5.2)
PROT SERPL-MCNC: 7.7 G/DL (ref 6–8.5)
RBC # BLD AUTO: 4.72 10*6/MM3 (ref 3.77–5.28)
SODIUM SERPL-SCNC: 139 MMOL/L (ref 136–145)
T-UPTAKE NFR SERPL: 1.23 TBI (ref 0.8–1.3)
T4 SERPL-MCNC: 11.2 MCG/DL (ref 4.5–11.7)
TRIGL SERPL-MCNC: 132 MG/DL (ref 0–150)
TSH SERPL DL<=0.05 MIU/L-ACNC: 0.65 UIU/ML (ref 0.27–4.2)
VIT B12 BLD-MCNC: 358 PG/ML (ref 211–946)
VLDLC SERPL-MCNC: 24 MG/DL (ref 5–40)
WBC NRBC COR # BLD AUTO: 10.86 10*3/MM3 (ref 3.4–10.8)

## 2024-03-22 PROCEDURE — 36415 COLL VENOUS BLD VENIPUNCTURE: CPT

## 2024-03-22 PROCEDURE — 80061 LIPID PANEL: CPT

## 2024-03-22 PROCEDURE — 82607 VITAMIN B-12: CPT

## 2024-03-22 PROCEDURE — 99214 OFFICE O/P EST MOD 30 MIN: CPT | Performed by: INTERNAL MEDICINE

## 2024-03-22 PROCEDURE — 84436 ASSAY OF TOTAL THYROXINE: CPT

## 2024-03-22 PROCEDURE — 80050 GENERAL HEALTH PANEL: CPT

## 2024-03-22 PROCEDURE — 82306 VITAMIN D 25 HYDROXY: CPT

## 2024-03-22 PROCEDURE — 82746 ASSAY OF FOLIC ACID SERUM: CPT

## 2024-03-22 PROCEDURE — 84479 ASSAY OF THYROID (T3 OR T4): CPT

## 2024-03-22 RX ORDER — LOSARTAN POTASSIUM 100 MG/1
100 TABLET ORAL DAILY
Qty: 90 TABLET | Refills: 3 | Status: SHIPPED | OUTPATIENT
Start: 2024-03-22

## 2024-03-22 RX ORDER — METOPROLOL SUCCINATE 50 MG/1
50 TABLET, EXTENDED RELEASE ORAL DAILY
Qty: 90 TABLET | Refills: 3 | Status: SHIPPED | OUTPATIENT
Start: 2024-03-22

## 2024-03-22 NOTE — PROGRESS NOTES
Aliza Garcia  [id number]  1975  49 y.o.  female    Referring Provider: Aliza Panda DO    Reason for  Visit:      Here for routine follow up   Initial visit for chest pain and shortness of breath   Elevated BP   coronary CT angiography report as below        Subjective    Intolerant to Zetia   Insurance will not pay for Repatha   Somnolence   Using CPAP     Easy fatiguability and increasing tired  Feels energy levels running low    Overall disabling fatigue     Labs not available for review     Overall feels the same   No new events or complaints since last visit   Overall the patient feels no major change from baseline symptoms   Similar symptoms as during last visit      No further chest pain   Mild chronic exertional shortness of breath on exertion relieved with rest  No significant cough or wheezing    No palpitations  No associated chest pain  No significant pedal edema    No fever or chills  No significant expectoration    No hemoptysis  No presyncope or syncope    Tolerating current medications well with no untoward side effects   Compliant with prescribed medication regimen. Tries to adhere to cardiac diet.     Now more active and no chest pain   No bleeding, excessive bruising, gait instability or fall risks        History of present illness:  Aliza Garcia is a 49 y.o. yo female with essential hypertension   who presents today for   Chief Complaint   Patient presents with    Hypertension     6 month follow up   .    History  Past Medical History:   Diagnosis Date    Abnormal ECG     Acid reflux     Arrhythmia     Bicuspid aortic valve 07/22/2022    Disease of thyroid gland     Hyperlipidemia     Primary hypertension 06/10/2022    Sleep apnea    ,   Past Surgical History:   Procedure Laterality Date    APPENDECTOMY N/A 05/14/2018    Procedure: APPENDECTOMY LAPAROSCOPIC;  Surgeon: Ange Dominguez MD;  Location: [facility];  Service: General    CHOLECYSTECTOMY     ,   Family History    Problem Relation Age of Onset    Hypertension Mother     Diabetes Mother     Hypothyroidism Mother     Heart disease Father     Hypertension Father     Diabetes Father     Atrial fibrillation Father     Arrhythmia Father         A Fib    Hyperlipidemia Father     Breast cancer Sister     No Known Problems Brother     No Known Problems Daughter     No Known Problems Son     No Known Problems Maternal Grandmother     No Known Problems Paternal Grandmother     No Known Problems Maternal Aunt     No Known Problems Paternal Aunt     Cancer Maternal Grandfather     Cancer Paternal Grandfather     No Known Problems Other     BRCA 1/2 Neg Hx     Colon cancer Neg Hx     Endometrial cancer Neg Hx     Ovarian cancer Neg Hx    ,   Social History     Tobacco Use    Smoking status: Never    Smokeless tobacco: Never   Vaping Use    Vaping status: Never Used   Substance Use Topics    Alcohol use: Yes     Comment: Occ drink, special occasions    Drug use: Never   ,     Medications  Current Outpatient Medications   Medication Sig Dispense Refill    amphetamine-dextroamphetamine (ADDERALL) 10 MG tablet Take 1 tablet by mouth Daily.      aspirin 81 MG EC tablet Take 1 tablet by mouth Daily.      cloNIDine (CATAPRES) 0.1 MG tablet As Needed.      hydroCHLOROthiazide (MICROZIDE) 12.5 MG capsule TAKE ONE CAPSULE BY MOUTH EVERY DAY 90 capsule 3    liothyronine (CYTOMEL) 5 MCG tablet Take 1 tablet by mouth Daily.      Milk Thistle 1000 MG capsule Take  by mouth.      norethindrone-ethinyl estradiol 0.5/0.75/1-35 MG-MCG per tablet Take 1 tablet by mouth Daily.      Ozempic, 0.25 or 0.5 MG/DOSE, 2 MG/3ML solution pen-injector 1 mg.      RABEprazole Sodium (ACIPHEX PO) Take  by mouth Every Night.      sertraline (ZOLOFT) 25 MG tablet Take 1 tablet by mouth Daily.      Synthroid 150 MCG tablet Take 1 tablet by mouth Daily.      Turmeric 1053 MG tablet Take  by mouth.      amLODIPine (NORVASC) 5 MG tablet TAKE 1 TABLET BY MOUTH EVERY DAY  "(Patient not taking: Reported on 3/22/2024) 30 tablet 11    ezetimibe (ZETIA) 10 MG tablet Take 1 tablet by mouth Daily. (Patient not taking: Reported on 3/22/2024) 30 tablet 11    losartan (Cozaar) 100 MG tablet Take 1 tablet by mouth Daily. 90 tablet 3    metoprolol succinate XL (TOPROL-XL) 50 MG 24 hr tablet Take 1 tablet by mouth Daily. 90 tablet 3     No current facility-administered medications for this visit.       Allergies:  Statins, Zetia [ezetimibe], Amlodipine, and Bactrim [sulfamethoxazole-trimethoprim]    Review of Systems  Review of Systems   Constitutional: Positive for malaise/fatigue.   HENT: Negative.     Eyes: Negative.    Cardiovascular:  Positive for dyspnea on exertion. Negative for chest pain, claudication, cyanosis, irregular heartbeat, leg swelling, near-syncope, orthopnea, palpitations, paroxysmal nocturnal dyspnea and syncope.   Respiratory: Negative.     Endocrine: Negative.    Hematologic/Lymphatic: Negative.    Skin: Negative.    Gastrointestinal:  Negative for anorexia.   Genitourinary: Negative.    Neurological: Negative.    Psychiatric/Behavioral: Negative.         Objective     Physical Exam:  /83   Pulse 84   Ht 167.6 cm (66\")   Wt 112 kg (248 lb)   BMI 40.03 kg/m²     Physical Exam  Constitutional:       Appearance: Normal appearance.   HENT:      Head: Normocephalic.   Eyes:      General: Lids are normal.   Neck:      Vascular: No carotid bruit.   Cardiovascular:      Rate and Rhythm: Regular rhythm.      Heart sounds: S1 normal and S2 normal. Murmur heard.      Systolic murmur is present with a grade of 2/6.   Pulmonary:      Effort: Pulmonary effort is normal.   Abdominal:      Palpations: Abdomen is soft.   Neurological:      Mental Status: She is alert.   Psychiatric:         Speech: Speech normal.         Behavior: Behavior normal.         Thought Content: Thought content normal.         Results Review:    Cardiac CT angiography  6/30/2022     Impression:    "   Total calcium score (using PARKER calcium score calculator): LAD 5.7, total 5.7  This is elevated and at the 90 th percentile for matched population        Coronary angiography:  Normal left main coronary artery  No obstructive disease of the left anterior descending coronary artery or diagonal branches  Normal small left circumflex coronary artery  Ostium of small obtuse marginal branch not well visualized but do not suspect any obstructive disease  Normal right coronary artery and its branches  Overall no evidence of any obstructive coronary artery disease identified in any of the visualized epicardial vessels     ___________________________________________________________________________     Recommendations:     Recommend continuing on aspirin therapy  Consider statin therapy if tolerated with target LDL well below 70 mg/dL  Ongoing risk factor modifications  Further evaluation if ongoing chest pain or high risk of suspicion of significant ischemic heart disease         Aliza Garcia  Complete Transthoracic Echocardiogram with Complete Doppler and Color Flow  Order# [id number]  Ordering physician: Osvaldo Lozano MD Study date: 7/15/22       Patient Information    Patient Name   Aliza Garcia MRN   [id number] Legal Sex   Female  (Age)   1975 (47 y.o.)      [facility] PACS Images     Show images for Adult Transthoracic Echo Complete w/ Color, Spectral and Contrast if necessary per protocol     Sedation Narrator Report    Sedation Narrator Report     Interpretation Summary    Left ventricular ejection fraction appears to be 56 - 60%. Left ventricular systolic function is normal.  Left ventricular diastolic function was normal.  A bicuspid aortic valve is suggested.  There is calcification of the aortic valve.  Estimated right ventricular systolic pressure from tricuspid regurgitation is normal (<35 mmHg).         Results for orders placed during the hospital encounter of 07/15/22    Adult Transthoracic Echo  Complete w/ Color, Spectral and Contrast if necessary per protocol    Interpretation Summary  · Left ventricular ejection fraction appears to be 56 - 60%. Left ventricular systolic function is normal.  · Left ventricular diastolic function was normal.  · Abnormal global longitudinal LV strain (GLS) = -14.0%  · A bicuspid aortic valve is suggested.  · Mild aortic valve stenosis is present. Mild aortic regurgitation  · Estimated right ventricular systolic pressure from tricuspid regurgitation is normal (<35 mmHg).  · Report delayed as patient called back for additional imaging and subsequent processing of imaging and availability for reporting       AORTA:   Peak systolic velocity of 93.83 cm/s with a normal waveform.     OTHER FINDINGS: Urinary bladder appears unremarkable.     IMPRESSION:  1. Normal grayscale appearance of bilateral kidneys.   2. No Doppler evidence of renal artery stenosis.     This report was finalized on 2022 14:57 by Dr. Bennett Diaz MD.    Aliza Garcia  Stress test only, exercise  Order# [id number]  Reading physician: John Diaz MD Ordering physician: Aliza Panda DO Study date: 22       Patient Information    Patient Name   Aliza Garcia MRN   [id number] Legal Sex   Female  (Age)   1975 (47 y.o.)         Interpretation Summary       The patient reported chest pain and shortness of breath during the stress test.     EQUIVOCAL RISK FOR ISCHEMIA - CONSIDER REPEAT STUDY WITH IMAGING        ____________________________________________________________________________________________________________________________________________  Health maintenance and recommendations    Low salt/ HTN/ Heart healthy carbohydrate restricted cardiac diet   The patient is advised to reduce or avoid caffeine or other cardiac stimulants.   Minimize or avoid  NSAID-type medications      Monitor for any signs of bleeding including red or dark stools. Fall precautions.    Advised staying uptodate with immunizations per established standard guidelines.    Offered to give patient  a copy of my notes     Questions were encouraged, asked and answered to the patient's  understanding and satisfaction. Questions if any regarding current medications and side effects, need for refills and importance of compliance to medications stressed.    Reviewed available prior notes, consults, prior visits, laboratory findings, radiology and cardiology relevant reports. Updated chart as applicable. I have reviewed the patient's medical history in detail and updated the computerized patient record as relevant.      Updated patient regarding any new or relevant abnormalities on review of records or any new findings on physical exam. Mentioned to patient about purpose of visit and desirable health short and long term goals and objectives.    Primary to monitor CBC CMP Lipid panel and TSH as applicable    ___________________________________________________________________________________________________________________________________________   Procedures    Assessment & Plan   Diagnoses and all orders for this visit:    1. Bicuspid aortic valve (Primary)    2. Hyperlipidemia, unspecified hyperlipidemia type  -     CBC & Differential; Future  -     Comprehensive Metabolic Panel; Future  -     Lipid Panel; Future  -     Vitamin B12; Future  -     Folate; Future  -     Vitamin D 25 Hydroxy; Future  -     Thyroid Panel With TSH; Future    3. Primary hypertension    4. HERNANDEZ (dyspnea on exertion)  -     CT Chest Without Contrast; Future  -     CBC & Differential; Future  -     Comprehensive Metabolic Panel; Future  -     Lipid Panel; Future  -     Vitamin B12; Future  -     Folate; Future  -     Vitamin D 25 Hydroxy; Future  -     Thyroid Panel With TSH; Future    5. Morbid obesity with BMI of 40.0-44.9, adult    6. Suspected sleep apnea    7. Easy fatigability  -     CBC & Differential; Future  -      Comprehensive Metabolic Panel; Future  -     Lipid Panel; Future  -     Vitamin B12; Future  -     Folate; Future  -     Vitamin D 25 Hydroxy; Future  -     Thyroid Panel With TSH; Future    Other orders  -     losartan (Cozaar) 100 MG tablet; Take 1 tablet by mouth Daily.  Dispense: 90 tablet; Refill: 3  -     metoprolol succinate XL (TOPROL-XL) 50 MG 24 hr tablet; Take 1 tablet by mouth Daily.  Dispense: 90 tablet; Refill: 3          Plan    Severely intolerant to Statins   Bring copies or have primary fax to our office labs and other tests prior to next visit   She wants to have Dr Panda to order Lipid panel  Insurance still will not pay for Repatha or weight loss surgery   Unable to afford Repatha and insurance would not pay   Hopefully insurance will see the benefit of this and approve it   Denial of Repatha is denial of the right care for her and increases her of risk of heart attack, stroke etc and insurance would be liable for this     Check BP and heart rates twice daily initially till blood pressures and heart rates under good control and then at least 3x / week,   If blood pressures continue to be well-controlled then can check week a month  at home and bring a recording for review next visit  If BP >130/85 or < 100/60 persistently over 3 reading 30 mins apart or if heart rates persistently above 100 bpm or less than 55 bpm call sooner for evaluation and advise       Patient expressed understanding  Encouraged and answered all questions   Discussed with the patient and all questioned fully answered. She will call me if any problems arise.   Discussed results of prior testing with patient : echo and cardiac CTA as well as ECG from 09/18/2023    Intolerant to statin   Consider injectable lipid lowering agents in future if LDL not at goal   Keep LDL below 70 mg/dl. Monitor liver and renal functions.   Monitor CBC, CMP, TSH (as indicated) and Lipid Panel by primary     Patient was advised to continue CPAP  daily.   Continue ECASA 81 mg daily regimen given risk factors and monitor for any signs of bleeding including red or dark stools. Fall precautions.      Orders Placed This Encounter   Procedures    CT Chest Without Contrast     Standing Status:   Future     Standing Expiration Date:   3/22/2025     Order Specific Question:   Does this exam require Semaj Bronch Protocol?     Answer:   No     Order Specific Question:   Release to patient     Answer:   Routine Release [[id number]]    Comprehensive Metabolic Panel     Standing Status:   Future     Standing Expiration Date:   3/22/2025     Order Specific Question:   Release to patient     Answer:   Routine Release [[id number]]    Lipid Panel     Standing Status:   Future     Standing Expiration Date:   3/22/2025     Order Specific Question:   Release to patient     Answer:   Routine Release [[id number]]    Vitamin B12     Standing Status:   Future     Standing Expiration Date:   3/22/2025     Order Specific Question:   Release to patient     Answer:   Routine Release [[id number]]    Folate     Standing Status:   Future     Standing Expiration Date:   3/22/2025     Order Specific Question:   Release to patient     Answer:   Routine Release [[id number]]    Vitamin D 25 Hydroxy     Standing Status:   Future     Standing Expiration Date:   3/22/2025     Order Specific Question:   Release to patient     Answer:   Routine Release [[id number]]    Thyroid Panel With TSH     Standing Status:   Future     Standing Expiration Date:   3/22/2025     Order Specific Question:   Release to patient     Answer:   Routine Release [[id number]]    CBC & Differential     Standing Status:   Future     Standing Expiration Date:   3/22/2025     Order Specific Question:   Manual Differential     Answer:   No     Order Specific Question:   Release to patient     Answer:   Routine Release [[id number]]     Call for results of above testing.     Will reduce beta blocker therapy by 50% and increase  Losartan to 100 mg daily   Recommend labs by primary but she wants me to order for today     Repeat echo 07/2025    Continue NIPPV daily.               Return in about 6 months (around 9/22/2024).

## 2024-03-29 ENCOUNTER — HOSPITAL ENCOUNTER (OUTPATIENT)
Dept: CT IMAGING | Facility: HOSPITAL | Age: 49
Discharge: HOME | End: 2024-03-29
Admitting: INTERNAL MEDICINE
Payer: COMMERCIAL

## 2024-03-29 DIAGNOSIS — R06.09 DOE (DYSPNEA ON EXERTION): ICD-10-CM

## 2024-03-29 PROCEDURE — 71250 CT THORAX DX C-: CPT

## 2024-04-29 ENCOUNTER — OFFICE VISIT (OUTPATIENT)
Dept: PULMONOLOGY | Age: 49
End: 2024-04-29
Payer: COMMERCIAL

## 2024-04-29 VITALS
SYSTOLIC BLOOD PRESSURE: 130 MMHG | HEART RATE: 97 BPM | RESPIRATION RATE: 16 BRPM | WEIGHT: 247 LBS | HEIGHT: 66 IN | BODY MASS INDEX: 39.7 KG/M2 | OXYGEN SATURATION: 97 % | DIASTOLIC BLOOD PRESSURE: 84 MMHG

## 2024-04-29 DIAGNOSIS — G47.10 HYPERSOMNIA: ICD-10-CM

## 2024-04-29 DIAGNOSIS — E66.9 OBESITY (BMI 30-39.9): ICD-10-CM

## 2024-04-29 DIAGNOSIS — G47.33 MODERATE OBSTRUCTIVE SLEEP APNEA: Primary | ICD-10-CM

## 2024-04-29 DIAGNOSIS — I10 PRIMARY HYPERTENSION: ICD-10-CM

## 2024-04-29 PROCEDURE — 99204 OFFICE O/P NEW MOD 45 MIN: CPT | Performed by: INTERNAL MEDICINE

## 2024-04-29 PROCEDURE — 3075F SYST BP GE 130 - 139MM HG: CPT | Performed by: INTERNAL MEDICINE

## 2024-04-29 PROCEDURE — 3079F DIAST BP 80-89 MM HG: CPT | Performed by: INTERNAL MEDICINE

## 2024-04-29 RX ORDER — METOPROLOL SUCCINATE 50 MG/1
50 TABLET, EXTENDED RELEASE ORAL DAILY
COMMUNITY

## 2024-04-29 RX ORDER — SEMAGLUTIDE 1.34 MG/ML
1 INJECTION, SOLUTION SUBCUTANEOUS
COMMUNITY
Start: 2024-04-26

## 2024-04-29 RX ORDER — ASPIRIN 81 MG/1
81 TABLET ORAL DAILY
COMMUNITY

## 2024-04-29 ASSESSMENT — ENCOUNTER SYMPTOMS
ANAL BLEEDING: 0
ABDOMINAL DISTENTION: 0
SHORTNESS OF BREATH: 0
COUGH: 0
RHINORRHEA: 0
APNEA: 1
WHEEZING: 0
CHEST TIGHTNESS: 0
ABDOMINAL PAIN: 0
BACK PAIN: 0

## 2024-04-29 NOTE — PROGRESS NOTES
software. Errors in voice recognition may have occurred.      An electronic signature was used to authenticate this note.    --Michael Mcbride MD

## 2024-05-02 ENCOUNTER — PROCEDURE VISIT (OUTPATIENT)
Dept: OTOLARYNGOLOGY | Facility: CLINIC | Age: 49
End: 2024-05-02
Payer: COMMERCIAL

## 2024-05-02 ENCOUNTER — OFFICE VISIT (OUTPATIENT)
Dept: OTOLARYNGOLOGY | Facility: CLINIC | Age: 49
End: 2024-05-02
Payer: COMMERCIAL

## 2024-05-02 VITALS — SYSTOLIC BLOOD PRESSURE: 130 MMHG | DIASTOLIC BLOOD PRESSURE: 90 MMHG | TEMPERATURE: 98 F | HEART RATE: 77 BPM

## 2024-05-02 DIAGNOSIS — Z01.10 HEARING WITHIN NORMAL LIMITS IN BOTH EARS: ICD-10-CM

## 2024-05-02 DIAGNOSIS — H93.A9 PULSATILE TINNITUS, UNSPECIFIED EAR: Primary | ICD-10-CM

## 2024-05-02 DIAGNOSIS — H93.A3 PULSATILE TINNITUS OF BOTH EARS: Primary | ICD-10-CM

## 2024-05-02 NOTE — PROGRESS NOTES
AUDIOMETRIC EVALUATION      Name:  Aliza Garcia  :  1975  Age:  49 y.o.  Date of Evaluation:  2024       History:  Ms. Garcia is seen today for a hearing evaluation due to tinnitus at the request of SOBIA Potts.    Audiologic Information:  Concerns for Hearing: ringing since having COVID in 2019  PETs: No  Other otologic surgical history: No  Aural Pressure/Fullness: No  Otalgia: No  Otorrhea: No  Tinnitus: Constant pulsing/buzzing bilaterally (louder in right)  Dizziness: No  Noise Exposure: No  Family history of hearing loss: Father (age and noise)  Head trauma requiring hospital stay: No  Chemotherapy: No  Other significant history: CPAP use, hypertension (meds maintain),     **Case history obtained in office and through EMR system      EVALUATION:        RESULTS:    Otoscopic Evaluation:  Right: clear canal, tympanic membrane visualized  Left: clear canal, tympanic membrane visualized    Tympanometry (226 Hz):  Right: Type A  Left: Type A    Pure Tone Audiometry:    Right: Normal hearing thresholds  Left: Normal hearing thresholds    Speech Audiometry:   Right: Speech Reception Threshold (SRT) was obtained at 15 dB HL  Word Recognition scores - Excellent (100)% at 55 dB, using NU-6 List 3A with 10 words  Left: Speech Reception Threshold (SRT) was obtained at 15 dB HL  Word Recognition scores - Excellent (100)% at 55 dB, using NU-6 List 3A with 10 words  SRT/PTA in good agreement bilaterally.    IMPRESSIONS:  Tympanometry showed normal middle ear pressure and static compliance, consistent with normal middle ear function for both ears. Pure tone thresholds for both ears show normal hearing, suggesting normal outer/middle ear function and normal cochlear/retrocochlear function. Patient was counseled with regard to the findings.      Diagnosis:  1. Pulsatile tinnitus, unspecified ear    2. Hearing within normal limits in both ears         RECOMMENDATIONS/PLAN:  Follow-up recommendations per  SOBIA Potts.  Practice good communication strategies to assist with everyday listening (eye contact with speakers, reduce background noise, encourage others to communicate clearly and slowly).  Tinnitus management strategies. Consider use of amplification, a white noise machine, low level TV/radio, or fan at night to help mask the tinnitus. It is also recommended to avoid caffeine, alcohol, tobacco, salt, high dose NSAIDs, and to increase hydration. Additional resources: [employer] latricia and American Tinnitus Association (www.gabriela.org).  Consistent utilization of hearing protection devices in noisy environments.  Repeat hearing evaluation if changes in hearing are noted or concerns arise.  Discussed results and recommendations with patient. Questions were addressed and the patient was encouraged to contact our department should concerns arise.        Dian Coles, CCC-A, F-AAA  Doctor of Audiology

## 2024-05-02 NOTE — PROGRESS NOTES
SOBIA Aburto  W ENT [facility]  [address], [address]  [facility] [address]  Fax [phone]  Phone [phone]      Visit Type: NEW PATIENT   Chief Complaint   Patient presents with    Ear Problem     Moises Tinnitus           HPI  Aliza Garcia is a 49 y.o. female who presents for evaluation of tinnitus. Reports has been present for years. Started after she had covid in 2019. Localized to ears bilaterally. Describes tinnitus as pulsation heart beat sound, constant but variable in intensity.   She does feel like she has had some decreased hearing since this started as well.   Denies ear pain, pressure, drainage, dizziness, headaches.     Was scheduled for MRI by PCP, has not completed yet.     Past Medical History:   Diagnosis Date    Abnormal ECG     Acid reflux     Arrhythmia     Bicuspid aortic valve 07/22/2022    Disease of thyroid gland     Hyperlipidemia     Primary hypertension 06/10/2022    Sleep apnea        Past Surgical History:   Procedure Laterality Date    APPENDECTOMY N/A 05/14/2018    Procedure: APPENDECTOMY LAPAROSCOPIC;  Surgeon: Ange Dominguez MD;  Location: [facility];  Service: General    CHOLECYSTECTOMY         Family History: Her family history includes Arrhythmia in her father; Atrial fibrillation in her father; Breast cancer in her sister; Cancer in her maternal grandfather and paternal grandfather; Diabetes in her father and mother; Heart disease in her father; Hyperlipidemia in her father; Hypertension in her father and mother; Hypothyroidism in her mother; No Known Problems in her brother, daughter, maternal aunt, maternal grandmother, paternal aunt, paternal grandmother, son, and another family member.     Social History: She  reports that she has never smoked. She has never used smokeless tobacco. She reports current alcohol use. She reports that she does not use drugs.    Home Medications:  Milk  Thistle, RABEprazole Sodium, Semaglutide(0.25 or 0.5MG/DOS), Turmeric, amphetamine-dextroamphetamine, aspirin, cloNIDine, ezetimibe, hydroCHLOROthiazide, levothyroxine, liothyronine, losartan, metoprolol succinate XL, norethindrone-ethinyl estradiol, and sertraline    Allergies:  She is allergic to statins, zetia [ezetimibe], amlodipine, and bactrim [sulfamethoxazole-trimethoprim].       Vital Signs:   Temp:  [98 °F (36.7 °C)] 98 °F (36.7 °C)  Heart Rate:  [77] 77  BP: (130)/(90) 130/90  ENT Physical Exam  Constitutional  Appearance: patient appears well-developed, well-nourished and well-groomed,  Communication/Voice: communication appropriate for developmental age; vocal quality normal;  Head and Face  Appearance: head appears normal, face appears normal and face appears atraumatic;  Ear  Hearing: intact;  Auricles: bilateral auricles normal;  External Mastoids: bilateral external mastoids normal;  Ear Canals: bilateral ear canals normal;  Tympanic Membranes: bilateral tympanic membranes normal;  Nose  External Nose: nares patent bilaterally; external nose normal;  Oral Cavity/Oropharynx  Lips: normal;  Teeth: normal;  Gums: gingiva normal;  Tongue: normal;  Oral mucosa: normal;  Hard palate: normal;  Soft palate: normal;  Tonsils: normal;  Neck  Neck: neck normal; neck palpation normal;  Respiratory  Inspection: breathing unlabored; normal breathing rate;  Cardiovascular  Inspection: extremities are warm and well perfused;  Neurovestibular  Mental Status: alert and oriented;           Result Review       RESULTS REVIEW    I have reviewed the patients old records in the chart.   The following results/records were reviewed:     Procedure visit with Dian King AUD (05/02/2024)          Assessment & Plan  Pulsatile tinnitus of both ears                Medical and surgical options were discussed including observation, continued medical management, medication modification, surgical management, and MRI testing.  Risks, benefits and alternatives were discussed and questions were answered. After considering the options, the patient decided to proceed with MRI testing.  Call for ear problems, especially change of hearing, ear pain or dizziness.  Use hearing protection in loud noise situations.  Use home masking techniques- use low sound level of a pleasant sound like a fan or radio at night to drown out the tinnitus sound. If not working, can consider formal masking device through our hearing aid department.  PCP has ordered MRI, she will try to call and schedule, if needs new order advised to call us and I will reorder.    Call with questions or concerns    Return in about 6 weeks (around 6/13/2024) for Recheck, review MRI results.        Electronically signed by SOBIA Aburto 05/02/24 2:39 PM CDT.

## 2024-05-28 ENCOUNTER — HOSPITAL ENCOUNTER (OUTPATIENT)
Dept: MRI IMAGING | Facility: HOSPITAL | Age: 49
Discharge: HOME | End: 2024-05-28
Payer: COMMERCIAL

## 2024-05-28 DIAGNOSIS — H93.13 TINNITUS, BILATERAL: ICD-10-CM

## 2024-05-28 PROCEDURE — 0 GADOBENATE DIMEGLUMINE 529 MG/ML SOLUTION: Performed by: PHYSICIAN ASSISTANT

## 2024-05-28 PROCEDURE — A9577 INJ MULTIHANCE: HCPCS | Performed by: PHYSICIAN ASSISTANT

## 2024-05-28 PROCEDURE — 70553 MRI BRAIN STEM W/O & W/DYE: CPT

## 2024-05-28 PROCEDURE — 82565 ASSAY OF CREATININE: CPT

## 2024-05-28 RX ADMIN — GADOBENATE DIMEGLUMINE 20 ML: 529 INJECTION, SOLUTION INTRAVENOUS at 08:33

## 2024-05-30 LAB — CREAT BLDA-MCNC: 0.8 MG/DL (ref 0.6–1.3)

## 2024-06-06 RX ORDER — HYDROCHLOROTHIAZIDE 12.5 MG/1
12.5 CAPSULE, GELATIN COATED ORAL DAILY
Qty: 90 CAPSULE | Refills: 3 | Status: SHIPPED | OUTPATIENT
Start: 2024-06-06

## 2024-06-20 ENCOUNTER — OFFICE VISIT (OUTPATIENT)
Dept: OTOLARYNGOLOGY | Facility: CLINIC | Age: 49
End: 2024-06-20
Payer: COMMERCIAL

## 2024-06-20 VITALS
SYSTOLIC BLOOD PRESSURE: 143 MMHG | WEIGHT: 248 LBS | BODY MASS INDEX: 39.86 KG/M2 | HEART RATE: 85 BPM | TEMPERATURE: 98 F | DIASTOLIC BLOOD PRESSURE: 82 MMHG | RESPIRATION RATE: 20 BRPM | HEIGHT: 66 IN

## 2024-06-20 DIAGNOSIS — Z01.10 HEARING WITHIN NORMAL LIMITS IN BOTH EARS: Primary | ICD-10-CM

## 2024-06-20 DIAGNOSIS — H93.A3 PULSATILE TINNITUS OF BOTH EARS: ICD-10-CM

## 2024-06-20 NOTE — PROGRESS NOTES
SOBIA Aburto  [facility] EAR NOSE & THROAT  [address], [address]  [facility] [address]  Fax [phone]  Phone [phone]      Visit Type: FOLLOW UP   Chief Complaint   Patient presents with    Follow-up     MRI results f/u           HPI  Aliza Garcia is a 49 y.o. female who presents for follow up evaluation. Recheck on pulsitile tinnitus and review of MRI.   Reports tinnitus has been present for years. Started after she had covid in 2019. Localized to ears bilaterally. Describes tinnitus as pulsation heart beat sound, constant but variable in intensity.       Since last visit she reports she has been using flonase daily, working on sleep and stress and states she feels this may be helping. Feels tinnitus is less intense or loud.     Past Medical History:   Diagnosis Date    Abnormal ECG     Acid reflux     Arrhythmia     Bicuspid aortic valve 07/22/2022    Disease of thyroid gland     Hyperlipidemia     Primary hypertension 06/10/2022    Sleep apnea        Past Surgical History:   Procedure Laterality Date    APPENDECTOMY N/A 05/14/2018    Procedure: APPENDECTOMY LAPAROSCOPIC;  Surgeon: Ange Dominguez MD;  Location: [facility];  Service: General    CHOLECYSTECTOMY         Family History: Her family history includes Arrhythmia in her father; Atrial fibrillation in her father; Breast cancer in her sister; Cancer in her maternal grandfather and paternal grandfather; Diabetes in her father and mother; Heart disease in her father; Hyperlipidemia in her father; Hypertension in her father and mother; Hypothyroidism in her mother; No Known Problems in her brother, daughter, maternal aunt, maternal grandmother, paternal aunt, paternal grandmother, son, and another family member.     Social History: She  reports that she has never smoked. She has never used smokeless tobacco. She reports current alcohol use. She reports that she does not use  drugs.    Home Medications:  Milk Thistle, RABEprazole Sodium, Semaglutide(0.25 or 0.5MG/DOS), Turmeric, amphetamine-dextroamphetamine, aspirin, cloNIDine, ezetimibe, hydroCHLOROthiazide, levothyroxine, liothyronine, losartan, metoprolol succinate XL, norethindrone-ethinyl estradiol, and sertraline    Allergies:  She is allergic to statins, zetia [ezetimibe], amlodipine, and bactrim [sulfamethoxazole-trimethoprim].       Vital Signs:   Temp:  [98 °F (36.7 °C)] 98 °F (36.7 °C)  Heart Rate:  [85] 85  Resp:  [20] 20  BP: (143)/(82) 143/82  ENT Physical Exam  Constitutional  Appearance: patient appears well-developed, well-nourished and well-groomed,  Communication/Voice: communication appropriate for developmental age; vocal quality normal;  Head and Face  Appearance: head appears normal, face appears normal and face appears atraumatic;  Ear  Hearing: intact;  Auricles: bilateral auricles normal;  External Mastoids: bilateral external mastoids normal;  Ear Canals: bilateral ear canals normal;  Tympanic Membranes: bilateral tympanic membranes normal;  Nose  External Nose: nares patent bilaterally; external nose normal;  Oral Cavity/Oropharynx  Lips: normal;  Teeth: normal;  Gums: gingiva normal;  Tongue: normal;  Oral mucosa: normal;  Hard palate: normal;  Soft palate: normal;  Tonsils: normal;  Neck  Neck: neck normal; neck palpation normal;  Respiratory  Inspection: breathing unlabored; normal breathing rate;  Cardiovascular  Inspection: extremities are warm and well perfused;  Neurovestibular  Mental Status: alert and oriented;           Result Review       RESULTS REVIEW    I have reviewed the patients old records in the chart.   The following results/records were reviewed:     MRI Internal Auditory Canal With Wo (05/28/2024 08:34)   MRI Brain With & Without Contrast (05/28/2024 08:33)          Assessment & Plan  Hearing within normal limits in both ears    Pulsatile tinnitus of both ears                MRI reviewed  "in office with patient, shows relatively normal, no significant findings in IAC.  She does report she has been working on basic home interventions and feels this may be helping, seems to be less intense or loud and \"not driving me crazy now\". Discussed further testing  including consideration of MRA or carotid US and further evaluation vs continue conservative measure and she elects to continue conservative measures for now. She is already on HCTZ. I reinforced home tinnitus recommendations focusing on low salt diet, sleep hygiene and stress reduction. Masking. She will monitor for new or worsening symptoms, try to establish further pattern.  We will recheck again in about 6 months.       Medical and surgical options were discussed including observation, continued medical management, medication modification, and surgical management. Risks, benefits and alternatives were discussed and questions were answered. After considering the options, the patient decided to proceed with observation.  Call for ear problems, especially change of hearing, ear pain or dizziness.  For the best results, use nasal sprays every day. It may take a week to build up in the nasal lining and a few more weeks to improve the eustachian tube function.  Protect getting water in the ears. If needed, may use over the counter silicone plugs or a cotton ball coated with vasoline when bathing.  Use hairdryer on a cool setting after bathing.  Use hearing protection in loud noise situations.  Use home masking techniques- use low sound level of a pleasant sound like a fan or radio at night to drown out the tinnitus sound. If not working, can consider formal masking device through our hearing aid department.  Continue flonase  Home tinnitus recommendation    Call with questions or concern    Return in about 6 months (around 12/20/2024) for Recheck tinnits.        Electronically signed by SOBIA Aburto 06/20/24 9:56 AM CDT.     "

## 2024-06-21 ENCOUNTER — OFFICE VISIT (OUTPATIENT)
Dept: CARDIOLOGY | Facility: CLINIC | Age: 49
End: 2024-06-21
Payer: COMMERCIAL

## 2024-06-21 VITALS
WEIGHT: 146 LBS | SYSTOLIC BLOOD PRESSURE: 124 MMHG | BODY MASS INDEX: 23.46 KG/M2 | HEIGHT: 66 IN | HEART RATE: 83 BPM | DIASTOLIC BLOOD PRESSURE: 81 MMHG

## 2024-06-21 DIAGNOSIS — I10 PRIMARY HYPERTENSION: Primary | ICD-10-CM

## 2024-06-21 DIAGNOSIS — R06.09 DOE (DYSPNEA ON EXERTION): ICD-10-CM

## 2024-06-21 DIAGNOSIS — Q23.1 BICUSPID AORTIC VALVE: ICD-10-CM

## 2024-06-21 DIAGNOSIS — E78.5 HYPERLIPIDEMIA, UNSPECIFIED HYPERLIPIDEMIA TYPE: ICD-10-CM

## 2024-06-21 DIAGNOSIS — Z82.49 FAMILY HISTORY OF EARLY CAD: ICD-10-CM

## 2024-06-21 PROBLEM — E66.01 MORBID OBESITY WITH BMI OF 40.0-44.9, ADULT: Status: RESOLVED | Noted: 2022-06-10 | Resolved: 2024-06-21

## 2024-06-21 PROCEDURE — 93000 ELECTROCARDIOGRAM COMPLETE: CPT | Performed by: INTERNAL MEDICINE

## 2024-06-21 PROCEDURE — 99214 OFFICE O/P EST MOD 30 MIN: CPT | Performed by: INTERNAL MEDICINE

## 2024-06-21 NOTE — PROGRESS NOTES
Aliza Garcia  [id number]  1975  49 y.o.  female    Referring Provider: Aliza Panda DO    Reason for  Visit:      Here for routine follow up   Initial visit for chest pain and shortness of breath   Elevated BP   coronary CT angiography report as below        Subjective    Overall feels the same   No new events or complaints since last visit   Overall the patient feels no major change from baseline symptoms   Similar symptoms as during last visit     Intolerant to Zetia   Insurance will not pay for Repatha   Somnolence   Using CPAP     More active now   No new events or complaints since last visit   Overall feels the same   No new events or complaints since last visit   Overall the patient feels no major change from baseline symptoms   Similar symptoms as during last visit      No further chest pain   Mild chronic exertional shortness of breath on exertion relieved with rest  No significant cough or wheezing    No palpitations  No associated chest pain  No significant pedal edema    No fever or chills  No significant expectoration    No hemoptysis  No presyncope or syncope    Tolerating current medications well with no untoward side effects   Compliant with prescribed medication regimen. Tries to adhere to cardiac diet.     Now more active and no chest pain   No bleeding, excessive bruising, gait instability or fall risks         History of present illness:  Aliza Garcia is a 49 y.o. yo female with essential hypertension   who presents today for   Chief Complaint   Patient presents with    Hypertension     3 month follow up    .    History  Past Medical History:   Diagnosis Date    Abnormal ECG     Acid reflux     Arrhythmia     Bicuspid aortic valve 07/22/2022    Disease of thyroid gland     Hyperlipidemia     Primary hypertension 06/10/2022    Sleep apnea    ,   Past Surgical History:   Procedure Laterality Date    APPENDECTOMY N/A 05/14/2018    Procedure: APPENDECTOMY LAPAROSCOPIC;  Surgeon:  Ange Dominguez MD;  Location:  [facility];  Service: General    CHOLECYSTECTOMY     ,   Family History   Problem Relation Age of Onset    Hypertension Mother     Diabetes Mother     Hypothyroidism Mother     Heart disease Father     Hypertension Father     Diabetes Father     Atrial fibrillation Father     Arrhythmia Father         A Fib    Hyperlipidemia Father     Breast cancer Sister     No Known Problems Brother     No Known Problems Daughter     No Known Problems Son     No Known Problems Maternal Grandmother     No Known Problems Paternal Grandmother     No Known Problems Maternal Aunt     No Known Problems Paternal Aunt     Cancer Maternal Grandfather     Cancer Paternal Grandfather     No Known Problems Other     BRCA 1/2 Neg Hx     Colon cancer Neg Hx     Endometrial cancer Neg Hx     Ovarian cancer Neg Hx    ,   Social History     Tobacco Use    Smoking status: Never    Smokeless tobacco: Never   Vaping Use    Vaping status: Never Used   Substance Use Topics    Alcohol use: Yes     Comment: Occ drink, special occasions    Drug use: Never   ,     Medications  Current Outpatient Medications   Medication Sig Dispense Refill    amphetamine-dextroamphetamine (ADDERALL) 10 MG tablet Take 1 tablet by mouth Daily.      aspirin 81 MG EC tablet Take 1 tablet by mouth Daily.      cloNIDine (CATAPRES) 0.1 MG tablet As Needed.      ezetimibe (ZETIA) 10 MG tablet Take 1 tablet by mouth Daily. 30 tablet 11    hydroCHLOROthiazide (MICROZIDE) 12.5 MG capsule TAKE 1 CAPSULE BY MOUTH EVERY DAY 90 capsule 3    liothyronine (CYTOMEL) 5 MCG tablet Take 1 tablet by mouth Daily.      losartan (Cozaar) 100 MG tablet Take 1 tablet by mouth Daily. 90 tablet 3    metoprolol succinate XL (TOPROL-XL) 50 MG 24 hr tablet Take 1 tablet by mouth Daily. 90 tablet 3    Milk Thistle 1000 MG capsule Take  by mouth.      norethindrone-ethinyl estradiol 0.5/0.75/1-35 MG-MCG per tablet Take 1 tablet by mouth Daily.      Ozempic, 0.25 or 0.5  "MG/DOSE, 2 MG/3ML solution pen-injector 1 mg.      RABEprazole Sodium (ACIPHEX PO) Take  by mouth Every Night.      sertraline (ZOLOFT) 25 MG tablet Take 1 tablet by mouth Daily.      Synthroid 150 MCG tablet Take 1 tablet by mouth Daily.      Turmeric 1053 MG tablet Take  by mouth.       No current facility-administered medications for this visit.       Allergies:  Statins, Zetia [ezetimibe], Amlodipine, and Bactrim [sulfamethoxazole-trimethoprim]    Review of Systems  Review of Systems   Constitutional: Positive for malaise/fatigue.   HENT: Negative.     Eyes: Negative.    Cardiovascular:  Positive for dyspnea on exertion. Negative for chest pain, claudication, cyanosis, irregular heartbeat, leg swelling, near-syncope, orthopnea, palpitations, paroxysmal nocturnal dyspnea and syncope.   Respiratory: Negative.     Endocrine: Negative.    Hematologic/Lymphatic: Negative.    Skin: Negative.    Gastrointestinal:  Negative for anorexia.   Genitourinary: Negative.    Neurological: Negative.    Psychiatric/Behavioral: Negative.         Objective     Physical Exam:  /81   Pulse 83   Ht 167.6 cm (66\")   Wt 66.2 kg (146 lb)   BMI 23.57 kg/m²     Physical Exam  Constitutional:       Appearance: Normal appearance.   HENT:      Head: Normocephalic.   Eyes:      General: Lids are normal.   Neck:      Vascular: No carotid bruit.   Cardiovascular:      Rate and Rhythm: Regular rhythm.      Heart sounds: S1 normal and S2 normal. Murmur heard.      Systolic murmur is present with a grade of 2/6.   Pulmonary:      Effort: Pulmonary effort is normal.   Abdominal:      Palpations: Abdomen is soft.   Neurological:      Mental Status: She is alert.   Psychiatric:         Speech: Speech normal.         Behavior: Behavior normal.         Thought Content: Thought content normal.         Results Review:    Cardiac CT angiography  6/30/2022     Impression:      Total calcium score (using PARKER calcium score calculator): LAD 5.7, " total 5.7  This is elevated and at the 90 th percentile for matched population        Coronary angiography:  Normal left main coronary artery  No obstructive disease of the left anterior descending coronary artery or diagonal branches  Normal small left circumflex coronary artery  Ostium of small obtuse marginal branch not well visualized but do not suspect any obstructive disease  Normal right coronary artery and its branches  Overall no evidence of any obstructive coronary artery disease identified in any of the visualized epicardial vessels     ___________________________________________________________________________     Recommendations:     Recommend continuing on aspirin therapy  Consider statin therapy if tolerated with target LDL well below 70 mg/dL  Ongoing risk factor modifications  Further evaluation if ongoing chest pain or high risk of suspicion of significant ischemic heart disease         Aliza Garcia  Complete Transthoracic Echocardiogram with Complete Doppler and Color Flow  Order# [id number]  Ordering physician: Osvaldo Lozano MD Study date: 7/15/22       Patient Information    Patient Name   Aliza Garcia MRN   [id number] Legal Sex   Female  (Age)   1975 (47 y.o.)      [facility] PACS Images     Show images for Adult Transthoracic Echo Complete w/ Color, Spectral and Contrast if necessary per protocol     Sedation Narrator Report    Sedation Narrator Report     Interpretation Summary    Left ventricular ejection fraction appears to be 56 - 60%. Left ventricular systolic function is normal.  Left ventricular diastolic function was normal.  A bicuspid aortic valve is suggested.  There is calcification of the aortic valve.  Estimated right ventricular systolic pressure from tricuspid regurgitation is normal (<35 mmHg).         Results for orders placed during the hospital encounter of 07/15/22    Adult Transthoracic Echo Complete w/ Color, Spectral and Contrast if necessary per  protocol    Interpretation Summary  · Left ventricular ejection fraction appears to be 56 - 60%. Left ventricular systolic function is normal.  · Left ventricular diastolic function was normal.  · Abnormal global longitudinal LV strain (GLS) = -14.0%  · A bicuspid aortic valve is suggested.  · Mild aortic valve stenosis is present. Mild aortic regurgitation  · Estimated right ventricular systolic pressure from tricuspid regurgitation is normal (<35 mmHg).  · Report delayed as patient called back for additional imaging and subsequent processing of imaging and availability for reporting       AORTA:   Peak systolic velocity of 93.83 cm/s with a normal waveform.     OTHER FINDINGS: Urinary bladder appears unremarkable.     IMPRESSION:  1. Normal grayscale appearance of bilateral kidneys.   2. No Doppler evidence of renal artery stenosis.     This report was finalized on 2022 14:57 by Dr. Bennett Diaz MD.    Aliza Garcia  Stress test only, exercise  Order# [id number]  Reading physician: John Diaz MD Ordering physician: Aliza Panda DO Study date: 22       Patient Information    Patient Name   Aliza Garcia MRN   [id number] Legal Sex   Female  (Age)   1975 (47 y.o.)         Interpretation Summary       The patient reported chest pain and shortness of breath during the stress test.     EQUIVOCAL RISK FOR ISCHEMIA - CONSIDER REPEAT STUDY WITH IMAGING        ____________________________________________________________________________________________________________________________________________  Health maintenance and recommendations    Low salt/ HTN/ Heart healthy carbohydrate restricted cardiac diet   The patient is advised to reduce or avoid caffeine or other cardiac stimulants.   Minimize or avoid  NSAID-type medications      Monitor for any signs of bleeding including red or dark stools. Fall precautions.   Advised staying uptodate with immunizations per established  standard guidelines.    Offered to give patient  a copy of my notes     Questions were encouraged, asked and answered to the patient's  understanding and satisfaction. Questions if any regarding current medications and side effects, need for refills and importance of compliance to medications stressed.    Reviewed available prior notes, consults, prior visits, laboratory findings, radiology and cardiology relevant reports. Updated chart as applicable. I have reviewed the patient's medical history in detail and updated the computerized patient record as relevant.      Updated patient regarding any new or relevant abnormalities on review of records or any new findings on physical exam. Mentioned to patient about purpose of visit and desirable health short and long term goals and objectives.    Primary to monitor CBC CMP Lipid panel and TSH as applicable    ___________________________________________________________________________________________________________________________________________     ECG 12 Lead    Date/Time: 6/21/2024 9:36 AM  Performed by: Osvaldo Lozano MD    Authorized by: Osvaldo Lozano MD  Comparison: compared with previous ECG from 9/18/2023  Comparison to previous ECG: Ventricular rate changed from 81  to 83  beats per minute      Rhythm: sinus rhythm  Rate: normal  Conduction: conduction normal  ST Segments: ST segments normal  QRS axis: normal  Other: no other findings          Assessment & Plan   Diagnoses and all orders for this visit:    1. Primary hypertension (Primary)    2. Hyperlipidemia, unspecified hyperlipidemia type    3. Bicuspid aortic valve    4. Family history of early CAD    5. HERNANDEZ (dyspnea on exertion)    Other orders  -     ECG 12 Lead          Plan    Overall much better now   Overall doing well no new cardiovascular symptoms and therefore no additional cardiac testing is required prior to next visit  If any interim issues arise will call me for further evaluation.     Repeat  lipids in 3 months   Primary can manage this     Severely intolerant to Statins   Bring copies or have primary fax to our office labs and other tests prior to next visit      Insurance still will not pay for Repatha or weight loss surgery   Unable to afford Repatha and insurance would not pay   Hopefully insurance will see the benefit of this and approve it   Denial of Repatha is denial of the right care for her and increases her of risk of heart attack, stroke etc and insurance would be liable for this     Check BP and heart rates twice daily initially till blood pressures and heart rates under good control and then at least 3x / week,   If blood pressures continue to be well-controlled then can check week a month  at home and bring a recording for review next visit  If BP >130/85 or < 100/60 persistently over 3 reading 30 mins apart or if heart rates persistently above 100 bpm or less than 55 bpm call sooner for evaluation and advise       Patient expressed understanding  Encouraged and answered all questions   Discussed with the patient and all questioned fully answered. She will call me if any problems arise.   Discussed results of prior testing with patient : echo and cardiac CTA as well as ECG from today    Intolerant to statin   Consider injectable lipid lowering agents in future if LDL not at goal   Keep LDL below 70 mg/dl. Monitor liver and renal functions.   Monitor CBC, CMP, TSH (as indicated) and Lipid Panel by primary     Patient was advised to continue CPAP daily.   Continue ECASA 81 mg daily regimen given risk factors and monitor for any signs of bleeding including red or dark stools. Fall precautions.      Call for results of above testing.     Reduced beta blocker therapy by 50% and increase Losartan to 100 mg daily   Much better for now   Repeat echo 07/2025    Continue NIPPV daily.               Return in about 9 months (around 3/21/2025).

## 2024-08-07 ENCOUNTER — TRANSCRIBE ORDERS (OUTPATIENT)
Dept: ADMINISTRATIVE | Facility: HOSPITAL | Age: 49
End: 2024-08-07
Payer: COMMERCIAL

## 2024-08-07 DIAGNOSIS — Z12.39 ENCOUNTER FOR OTHER SCREENING FOR MALIGNANT NEOPLASM OF BREAST: Primary | ICD-10-CM

## 2024-08-16 ENCOUNTER — HOSPITAL ENCOUNTER (OUTPATIENT)
Dept: MAMMOGRAPHY | Facility: HOSPITAL | Age: 49
Discharge: HOME | End: 2024-08-16
Admitting: FAMILY MEDICINE
Payer: COMMERCIAL

## 2024-08-16 DIAGNOSIS — Z12.39 ENCOUNTER FOR OTHER SCREENING FOR MALIGNANT NEOPLASM OF BREAST: ICD-10-CM

## 2024-08-16 PROCEDURE — 77063 BREAST TOMOSYNTHESIS BI: CPT

## 2024-08-16 PROCEDURE — 77067 SCR MAMMO BI INCL CAD: CPT

## 2024-11-05 ENCOUNTER — OFFICE VISIT (OUTPATIENT)
Dept: PULMONOLOGY | Age: 49
End: 2024-11-05
Payer: COMMERCIAL

## 2024-11-05 VITALS
RESPIRATION RATE: 20 BRPM | TEMPERATURE: 97.5 F | WEIGHT: 250 LBS | SYSTOLIC BLOOD PRESSURE: 121 MMHG | BODY MASS INDEX: 40.18 KG/M2 | HEIGHT: 66 IN | OXYGEN SATURATION: 96 % | HEART RATE: 79 BPM | DIASTOLIC BLOOD PRESSURE: 82 MMHG

## 2024-11-05 DIAGNOSIS — G47.33 MODERATE OBSTRUCTIVE SLEEP APNEA: Primary | ICD-10-CM

## 2024-11-05 DIAGNOSIS — G47.10 HYPERSOMNIA: ICD-10-CM

## 2024-11-05 DIAGNOSIS — I10 PRIMARY HYPERTENSION: ICD-10-CM

## 2024-11-05 DIAGNOSIS — E66.9 OBESITY (BMI 30-39.9): ICD-10-CM

## 2024-11-05 PROCEDURE — 99213 OFFICE O/P EST LOW 20 MIN: CPT | Performed by: INTERNAL MEDICINE

## 2024-11-05 PROCEDURE — 3074F SYST BP LT 130 MM HG: CPT | Performed by: INTERNAL MEDICINE

## 2024-11-05 PROCEDURE — 3079F DIAST BP 80-89 MM HG: CPT | Performed by: INTERNAL MEDICINE

## 2024-11-05 ASSESSMENT — ENCOUNTER SYMPTOMS
SHORTNESS OF BREATH: 0
BACK PAIN: 0
ANAL BLEEDING: 0
WHEEZING: 0
ABDOMINAL DISTENTION: 0
RHINORRHEA: 0
COUGH: 0
CHEST TIGHTNESS: 0
APNEA: 1
ABDOMINAL PAIN: 0

## 2024-11-05 NOTE — PROGRESS NOTES
Pulmonary and Sleep Medicine    Caro Kerr (:  1975) is a 49 y.o. female,Established patient, here for evaluation of the following chief complaint(s):  Follow-up (6 month follow up- CHARLES/DME uploaded //Pt states that she has no concerns at this time)      Referring physician:  No referring provider defined for this encounter.     ASSESSMENT/PLAN:  1. Moderate obstructive sleep apnea  2. Obesity (BMI 30-39.9)  3. Hypersomnia  4. Primary hypertension        Continue current management with the CPAP she is compliant with the CPAP she feels the CPAP helps.       Michael Mcbride MD, [facility], [facility]    No follow-ups on file.    SUBJECTIVE/OBJECTIVE:        Patient is here for follow-up on obstructive sleep apnea.  Her CPAP download data was reviewed.  My interpretation of the download data is that she is using the CPAP on average about 7 hours a night.  Apnea-hypopnea index while on the CPAP is 0.1 events per hour.  She feels that the CPAP is helping her significantly.          Continue the following medications as reported by the patient:    Prior to Visit Medications    Medication Sig Taking? Authorizing Provider   OZEMPIC, 1 MG/DOSE, 4 MG/3ML SOPN sc injection 1 mg Yes Kennedy Romano MD   aspirin 81 MG EC tablet Take 1 tablet by mouth daily Yes ProviderKennedy MD   metoprolol succinate (TOPROL XL) 50 MG extended release tablet Take 1 tablet by mouth daily Yes Kennedy oRmano MD   hydroCHLOROthiazide (MICROZIDE) 12.5 MG capsule Take 1 capsule by mouth daily Yes Kennedy Romano MD   cloNIDine (CATAPRES) 0.1 MG tablet as needed Yes Kennedy Romano MD   losartan (COZAAR) 50 MG tablet Take 2 tablets by mouth daily Yes Kennedy Romano MD   ipratropium (ATROVENT) 0.03 % nasal spray 2 sprays by Each Nostril route in the morning and 2 sprays in the evening. Yes Isabelle Cevallos APRN - CNP   Cholecalciferol (VITAMIN D3 GUMMIES ADULT PO) Take 50 mcg by mouth daily  Yes

## 2025-03-21 ENCOUNTER — OFFICE VISIT (OUTPATIENT)
Dept: CARDIOLOGY | Facility: CLINIC | Age: 50
End: 2025-03-21
Payer: COMMERCIAL

## 2025-03-21 VITALS
SYSTOLIC BLOOD PRESSURE: 115 MMHG | HEIGHT: 66 IN | HEART RATE: 71 BPM | DIASTOLIC BLOOD PRESSURE: 77 MMHG | WEIGHT: 241 LBS | BODY MASS INDEX: 38.73 KG/M2

## 2025-03-21 DIAGNOSIS — E78.5 HYPERLIPIDEMIA, UNSPECIFIED HYPERLIPIDEMIA TYPE: ICD-10-CM

## 2025-03-21 DIAGNOSIS — R06.09 DOE (DYSPNEA ON EXERTION): ICD-10-CM

## 2025-03-21 DIAGNOSIS — I10 PRIMARY HYPERTENSION: ICD-10-CM

## 2025-03-21 DIAGNOSIS — Q23.81 BICUSPID AORTIC VALVE: Primary | ICD-10-CM

## 2025-03-21 PROCEDURE — 99214 OFFICE O/P EST MOD 30 MIN: CPT | Performed by: INTERNAL MEDICINE

## 2025-03-21 NOTE — PROGRESS NOTES
Aliza Garcia  [id number]  1975  50 y.o.  female    Referring Provider: Aliza Panda DO    Reason for  Visit:      Here for routine follow up          Subjective    Mild chronic exertional shortness of breath on exertion relieved with rest  No significant cough or wheezing    No palpitations  No associated chest pain  No significant pedal edema    No fever or chills  No significant expectoration    No hemoptysis  No presyncope or syncope    Tolerating current medications well with no untoward side effects   Compliant with prescribed medication regimen. Tries to adhere to cardiac diet.     Less mental stress   BP well controlled at home.    BP in clinic as below      Using CPAP          History of present illness:  Aliza Garcia is a 50 y.o. yo female with essential hypertension   who presents today for   Chief Complaint   Patient presents with   • Hypertension     6 month follow up    .    History  Past Medical History:   Diagnosis Date   • Abnormal ECG    • Acid reflux    • Arrhythmia    • Bicuspid aortic valve 07/22/2022   • Disease of thyroid gland    • Hyperlipidemia    • Primary hypertension 06/10/2022   • Sleep apnea    ,   Past Surgical History:   Procedure Laterality Date   • APPENDECTOMY N/A 05/14/2018    Procedure: APPENDECTOMY LAPAROSCOPIC;  Surgeon: Ange Dominguez MD;  Location: [facility] OR;  Service: General   • CHOLECYSTECTOMY     ,   Family History   Problem Relation Age of Onset   • Hypertension Mother    • Diabetes Mother    • Hypothyroidism Mother    • Heart disease Father    • Hypertension Father    • Diabetes Father    • Atrial fibrillation Father    • Arrhythmia Father         A Fib   • Hyperlipidemia Father    • Breast cancer Sister    • No Known Problems Brother    • No Known Problems Daughter    • No Known Problems Son    • No Known Problems Maternal Grandmother    • No Known Problems Paternal Grandmother    • No Known Problems Maternal Aunt    • No Known Problems  Paternal Aunt    • Cancer Maternal Grandfather    • Cancer Paternal Grandfather    • No Known Problems Other    • BRCA 1/2 Neg Hx    • Colon cancer Neg Hx    • Endometrial cancer Neg Hx    • Ovarian cancer Neg Hx    ,   Social History     Tobacco Use   • Smoking status: Never   • Smokeless tobacco: Never   Vaping Use   • Vaping status: Never Used   Substance Use Topics   • Alcohol use: Yes     Comment: Occ drink, special occasions   • Drug use: Never   ,     Medications  Current Outpatient Medications   Medication Sig Dispense Refill   • amphetamine-dextroamphetamine (ADDERALL) 10 MG tablet Take 1 tablet by mouth Daily.     • aspirin 81 MG EC tablet Take 1 tablet by mouth Daily.     • cloNIDine (CATAPRES) 0.1 MG tablet As Needed.     • hydroCHLOROthiazide (MICROZIDE) 12.5 MG capsule TAKE 1 CAPSULE BY MOUTH EVERY DAY 90 capsule 3   • liothyronine (CYTOMEL) 5 MCG tablet Take 1 tablet by mouth Daily.     • losartan (Cozaar) 100 MG tablet Take 1 tablet by mouth Daily. 90 tablet 3   • metoprolol succinate XL (TOPROL-XL) 50 MG 24 hr tablet Take 1 tablet by mouth Daily. 90 tablet 3   • Milk Thistle 1000 MG capsule Take  by mouth.     • norethindrone-ethinyl estradiol 0.5/0.75/1-35 MG-MCG per tablet Take 1 tablet by mouth Daily.     • Ozempic, 0.25 or 0.5 MG/DOSE, 2 MG/3ML solution pen-injector 1 mg.     • RABEprazole Sodium (ACIPHEX PO) Take  by mouth Every Night.     • sertraline (ZOLOFT) 25 MG tablet Take 1 tablet by mouth Daily.     • Synthroid 150 MCG tablet Take 1 tablet by mouth Daily.     • Turmeric 1053 MG tablet Take  by mouth.       No current facility-administered medications for this visit.       Allergies:  Statins, Zetia [ezetimibe], Amlodipine, and Bactrim [sulfamethoxazole-trimethoprim]    Review of Systems  Review of Systems   Constitutional: Positive for malaise/fatigue.   HENT: Negative.     Eyes: Negative.    Cardiovascular:  Positive for dyspnea on exertion. Negative for chest pain, claudication,  "cyanosis, irregular heartbeat, leg swelling, near-syncope, orthopnea, palpitations, paroxysmal nocturnal dyspnea and syncope.   Respiratory: Negative.     Endocrine: Negative.    Hematologic/Lymphatic: Negative.    Skin: Negative.    Gastrointestinal:  Negative for anorexia.   Genitourinary: Negative.    Neurological: Negative.    Psychiatric/Behavioral: Negative.         Objective     Physical Exam:  /77   Pulse 71   Ht 167.6 cm (66\")   Wt 109 kg (241 lb)   BMI 38.90 kg/m²     Physical Exam  Constitutional:       Appearance: Normal appearance.   HENT:      Head: Normocephalic.   Eyes:      General: Lids are normal.   Neck:      Vascular: No carotid bruit.   Cardiovascular:      Rate and Rhythm: Regular rhythm.      Heart sounds: S1 normal and S2 normal. Murmur heard.      Systolic murmur is present with a grade of 2/6.   Pulmonary:      Effort: Pulmonary effort is normal.   Abdominal:      Palpations: Abdomen is soft.   Neurological:      Mental Status: She is alert.   Psychiatric:         Speech: Speech normal.         Behavior: Behavior normal.         Thought Content: Thought content normal.       Results Review:    Cardiac CT angiography  6/30/2022     Impression:      Total calcium score (using PARKER calcium score calculator): LAD 5.7, total 5.7  This is elevated and at the 90 th percentile for matched population        Coronary angiography:  Normal left main coronary artery  No obstructive disease of the left anterior descending coronary artery or diagonal branches  Normal small left circumflex coronary artery  Ostium of small obtuse marginal branch not well visualized but do not suspect any obstructive disease  Normal right coronary artery and its branches  Overall no evidence of any obstructive coronary artery disease identified in any of the visualized epicardial vessels     ___________________________________________________________________________     Recommendations:     Recommend continuing on " aspirin therapy  Consider statin therapy if tolerated with target LDL well below 70 mg/dL  Ongoing risk factor modifications  Further evaluation if ongoing chest pain or high risk of suspicion of significant ischemic heart disease         Aliza Garcia  Complete Transthoracic Echocardiogram with Complete Doppler and Color Flow  Order# [id number]  Ordering physician: Osvaldo Lozano MD Study date: 7/15/22       Patient Information    Patient Name   Aliza Garcia MRN   [id number] Legal Sex   Female  (Age)   1975 (47 y.o.)      [facility] PACS Images     Show images for Adult Transthoracic Echo Complete w/ Color, Spectral and Contrast if necessary per protocol     Sedation Narrator Report    Sedation Narrator Report     Interpretation Summary    Left ventricular ejection fraction appears to be 56 - 60%. Left ventricular systolic function is normal.  Left ventricular diastolic function was normal.  A bicuspid aortic valve is suggested.  There is calcification of the aortic valve.  Estimated right ventricular systolic pressure from tricuspid regurgitation is normal (<35 mmHg).         Results for orders placed during the hospital encounter of 07/15/22    Adult Transthoracic Echo Complete w/ Color, Spectral and Contrast if necessary per protocol    Interpretation Summary  · Left ventricular ejection fraction appears to be 56 - 60%. Left ventricular systolic function is normal.  · Left ventricular diastolic function was normal.  · Abnormal global longitudinal LV strain (GLS) = -14.0%  · A bicuspid aortic valve is suggested.  · Mild aortic valve stenosis is present. Mild aortic regurgitation  · Estimated right ventricular systolic pressure from tricuspid regurgitation is normal (<35 mmHg).  · Report delayed as patient called back for additional imaging and subsequent processing of imaging and availability for reporting       AORTA:   Peak systolic velocity of 93.83 cm/s with a normal waveform.     OTHER FINDINGS:  Urinary bladder appears unremarkable.     IMPRESSION:  1. Normal grayscale appearance of bilateral kidneys.   2. No Doppler evidence of renal artery stenosis.     This report was finalized on 2022 14:57 by Dr. Bennett Diaz MD.    Aliza Garcia  Stress test only, exercise  Order# [id number]  Reading physician: John Diaz MD Ordering physician: Aliaz Panda DO Study date: 22       Patient Information    Patient Name   Aliza Garcia MRN   [id number] Legal Sex   Female  (Age)   1975 (47 y.o.)         Interpretation Summary       The patient reported chest pain and shortness of breath during the stress test.     EQUIVOCAL RISK FOR ISCHEMIA - CONSIDER REPEAT STUDY WITH IMAGING        ____________________________________________________________________________________________________________________________________________  Health maintenance and recommendations    Low salt/ HTN/ Heart healthy carbohydrate restricted cardiac diet   The patient is advised to reduce or avoid caffeine or other cardiac stimulants.   Minimize or avoid  NSAID-type medications      Monitor for any signs of bleeding including red or dark stools. Fall precautions.   Advised staying uptodate with immunizations per established standard guidelines.    Offered to give patient  a copy of my notes     Questions were encouraged, asked and answered to the patient's  understanding and satisfaction. Questions if any regarding current medications and side effects, need for refills and importance of compliance to medications stressed.    Reviewed available prior notes, consults, prior visits, laboratory findings, radiology and cardiology relevant reports. Updated chart as applicable. I have reviewed the patient's medical history in detail and updated the computerized patient record as relevant.      Updated patient regarding any new or relevant abnormalities on review of records or any new findings on physical  exam. Mentioned to patient about purpose of visit and desirable health short and long term goals and objectives.    Primary to monitor CBC CMP Lipid panel and TSH as applicable    ___________________________________________________________________________________________________________________________________________   Procedures    Assessment & Plan   Diagnoses and all orders for this visit:    1. Bicuspid aortic valve (Primary)  -     MRI Cardiac Function Complete With & Without Morphology    2. HERNANDEZ (dyspnea on exertion)  -     MRI Cardiac Function Complete With & Without Morphology    3. Primary hypertension: Check BP and heart rates twice daily initially till blood pressures and heart rates under good control and then at least 3x / week,   If blood pressures continue to be well-controlled then can check week a month  at home and bring a recording for review next visit  If BP >130/85 or < 100/60 persistently over 3 reading 30 mins apart or if heart rates persistently above 100 bpm or less than 55 bpm call sooner for evaluation and advise     4. Hyperlipidemia, unspecified hyperlipidemia type: intolerant to statin, Insurance will not pay for injectables             Plan    Will order CMR     Orders Placed This Encounter   Procedures   • MRI Cardiac Function Complete With & Without Morphology     Reason for Exam::   bicuspid aortic valve, aortic stenosis     Release to patient:   Routine Release [[id number]]        Monitor lipids   Primary can manage this     Severely intolerant to Statins    Insurance still will not pay for Repatha or weight loss surgery   Unable to afford Repatha and insurance would not pay   Hopefully insurance will see the benefit of this and approve it   Denial of Repatha is denial of the right care for her and increases her of risk of heart attack, stroke etc and insurance would be liable for this     Check BP and heart rates twice daily initially till blood pressures and heart rates under  good control and then at least 3x / week,   If blood pressures continue to be well-controlled then can check week a month  at home and bring a recording for review next visit  If BP >130/85 or < 100/60 persistently over 3 reading 30 mins apart or if heart rates persistently above 100 bpm or less than 55 bpm call sooner for evaluation and advise       Keep LDL below 70 mg/dl. Monitor liver and renal functions.   Monitor CBC, CMP, TSH (as indicated) and Lipid Panel by primary     Patient was advised to continue CPAP daily.   Start ECASA 81 mg daily regimen given risk factors and monitor for any signs of bleeding including red or dark stools. Fall precautions.      Continue NIPPV daily.               No follow-ups on file.

## 2025-03-27 RX ORDER — LOSARTAN POTASSIUM 100 MG/1
100 TABLET ORAL DAILY
Qty: 90 TABLET | Refills: 3 | Status: SHIPPED | OUTPATIENT
Start: 2025-03-27

## 2025-04-23 ENCOUNTER — HOSPITAL ENCOUNTER (OUTPATIENT)
Dept: MRI IMAGING | Facility: HOSPITAL | Age: 50
Discharge: HOME | End: 2025-04-23
Admitting: INTERNAL MEDICINE
Payer: COMMERCIAL

## 2025-04-23 LAB — CREAT BLDA-MCNC: 0.9 MG/DL (ref 0.6–1.3)

## 2025-04-23 PROCEDURE — A9579 GAD-BASE MR CONTRAST NOS,1ML: HCPCS | Performed by: INTERNAL MEDICINE

## 2025-04-23 PROCEDURE — 82565 ASSAY OF CREATININE: CPT

## 2025-04-23 PROCEDURE — 25510000001 GADOPICLENOL 0.5 MMOL/ML SOLUTION: Performed by: INTERNAL MEDICINE

## 2025-04-23 PROCEDURE — 75561 CARDIAC MRI FOR MORPH W/DYE: CPT

## 2025-04-23 RX ADMIN — GADOPICLENOL 10 ML: 485.1 INJECTION INTRAVENOUS at 09:29

## 2025-04-30 ENCOUNTER — OFFICE VISIT (OUTPATIENT)
Dept: CARDIOLOGY | Facility: CLINIC | Age: 50
End: 2025-04-30
Payer: COMMERCIAL

## 2025-04-30 VITALS
BODY MASS INDEX: 37.77 KG/M2 | SYSTOLIC BLOOD PRESSURE: 125 MMHG | HEIGHT: 66 IN | WEIGHT: 235 LBS | DIASTOLIC BLOOD PRESSURE: 76 MMHG | HEART RATE: 92 BPM

## 2025-04-30 DIAGNOSIS — I42.1 CARDIOMYOPATHY, HYPERTROPHIC OBSTRUCTIVE: ICD-10-CM

## 2025-04-30 DIAGNOSIS — R29.818 SUSPECTED SLEEP APNEA: Primary | ICD-10-CM

## 2025-04-30 DIAGNOSIS — I10 PRIMARY HYPERTENSION: ICD-10-CM

## 2025-04-30 DIAGNOSIS — E78.5 HYPERLIPIDEMIA, UNSPECIFIED HYPERLIPIDEMIA TYPE: ICD-10-CM

## 2025-04-30 PROCEDURE — 99213 OFFICE O/P EST LOW 20 MIN: CPT | Performed by: INTERNAL MEDICINE

## 2025-04-30 NOTE — PROGRESS NOTES
Aliza Garcia  [id number]  1975  50 y.o.  female    Referring Provider: Aliza Panda DO    Reason for  Visit:      Here for routine follow up    CMR as below         Subjective    Overall feels the same   No new events or complaints since last visit   Overall the patient feels no major change from baseline symptoms   Similar symptoms as during last visit       Mild chronic exertional shortness of breath on exertion relieved with rest  No significant cough or wheezing    No palpitations  No associated chest pain  No significant pedal edema    No fever or chills  No significant expectoration    No hemoptysis  No presyncope or syncope    Tolerating current medications well with no untoward side effects   Compliant with prescribed medication regimen. Tries to adhere to cardiac diet.     Less mental stress   BP well controlled at home.    BP in clinic as below      Using CPAP          History of present illness:  Aliza Garcia is a 50 y.o. yo female with essential hypertension   who presents today for   Chief Complaint   Patient presents with    Results     1 month follow up    .    History  Past Medical History:   Diagnosis Date    Abnormal ECG     Acid reflux     Arrhythmia     Bicuspid aortic valve 07/22/2022    Disease of thyroid gland     Hyperlipidemia     Primary hypertension 06/10/2022    Sleep apnea    ,   Past Surgical History:   Procedure Laterality Date    APPENDECTOMY N/A 05/14/2018    Procedure: APPENDECTOMY LAPAROSCOPIC;  Surgeon: Ange Dominguez MD;  Location: [facility] OR;  Service: General    CHOLECYSTECTOMY     ,   Family History   Problem Relation Age of Onset    Hypertension Mother     Diabetes Mother     Hypothyroidism Mother     Heart disease Father     Hypertension Father     Diabetes Father     Atrial fibrillation Father     Arrhythmia Father         A Fib    Hyperlipidemia Father     Breast cancer Sister     No Known Problems Brother     No Known Problems Daughter     No Known  Problems Son     No Known Problems Maternal Grandmother     No Known Problems Paternal Grandmother     No Known Problems Maternal Aunt     No Known Problems Paternal Aunt     Cancer Maternal Grandfather     Cancer Paternal Grandfather     No Known Problems Other     BRCA 1/2 Neg Hx     Colon cancer Neg Hx     Endometrial cancer Neg Hx     Ovarian cancer Neg Hx    ,   Social History     Tobacco Use    Smoking status: Never    Smokeless tobacco: Never   Vaping Use    Vaping status: Never Used   Substance Use Topics    Alcohol use: Yes     Comment: Occ drink, special occasions    Drug use: Never   ,     Medications  Current Outpatient Medications   Medication Sig Dispense Refill    amphetamine-dextroamphetamine (ADDERALL) 10 MG tablet Take 1 tablet by mouth Daily.      aspirin 81 MG EC tablet Take 1 tablet by mouth Daily.      cloNIDine (CATAPRES) 0.1 MG tablet As Needed.      hydroCHLOROthiazide (MICROZIDE) 12.5 MG capsule TAKE 1 CAPSULE BY MOUTH EVERY DAY 90 capsule 3    liothyronine (CYTOMEL) 5 MCG tablet Take 1 tablet by mouth Daily.      losartan (COZAAR) 100 MG tablet TAKE 1 TABLET BY MOUTH DAILY. 90 tablet 3    metoprolol succinate XL (TOPROL-XL) 50 MG 24 hr tablet Take 1 tablet by mouth Daily. 90 tablet 3    Milk Thistle 1000 MG capsule Take  by mouth.      norethindrone-ethinyl estradiol 0.5/0.75/1-35 MG-MCG per tablet Take 1 tablet by mouth Daily.      Ozempic, 0.25 or 0.5 MG/DOSE, 2 MG/3ML solution pen-injector 1 mg.      RABEprazole Sodium (ACIPHEX PO) Take  by mouth Every Night.      sertraline (ZOLOFT) 25 MG tablet Take 1 tablet by mouth Daily.      Synthroid 150 MCG tablet Take 1 tablet by mouth Daily.      Turmeric 1053 MG tablet Take  by mouth.       No current facility-administered medications for this visit.       Allergies:  Statins, Zetia [ezetimibe], Amlodipine, and Bactrim [sulfamethoxazole-trimethoprim]    Review of Systems  Review of Systems   Constitutional: Positive for malaise/fatigue.  "  HENT: Negative.     Eyes: Negative.    Cardiovascular:  Positive for dyspnea on exertion. Negative for chest pain, claudication, cyanosis, irregular heartbeat, leg swelling, near-syncope, orthopnea, palpitations, paroxysmal nocturnal dyspnea and syncope.   Respiratory: Negative.     Endocrine: Negative.    Hematologic/Lymphatic: Negative.    Skin: Negative.    Gastrointestinal:  Negative for anorexia.   Genitourinary: Negative.    Neurological: Negative.    Psychiatric/Behavioral: Negative.         Objective     Physical Exam:  /76   Pulse 92   Ht 167.6 cm (66\")   Wt 107 kg (235 lb)   BMI 37.93 kg/m²     Physical Exam  Constitutional:       Appearance: Normal appearance.   HENT:      Head: Normocephalic.   Eyes:      General: Lids are normal.   Neck:      Vascular: No carotid bruit.   Cardiovascular:      Rate and Rhythm: Regular rhythm.      Heart sounds: S1 normal and S2 normal. Murmur heard.      Systolic murmur is present with a grade of 2/6.   Pulmonary:      Effort: Pulmonary effort is normal.   Abdominal:      Palpations: Abdomen is soft.   Neurological:      Mental Status: She is alert.   Psychiatric:         Speech: Speech normal.         Behavior: Behavior normal.         Thought Content: Thought content normal.         MRI Cardiac Function Complete With & Without Morphology [[id number]] (Order [id number])  Order  Status: Final result     Appointment Information    PACS Images     Radiology Images  Study Result    Narrative & Impression      Clinical indicaton reviewed: Suspected bicuspid aortic valve  __________________________________________________________________________________________________________________________________________   Techniques: Cardiac MRI was performed using a 1.5T [facility] in accordance to the safety protocol described by the manufacture and published data.       1. Cine sequences were obtained using balanced SSFP (TruFISP) through the long axis 2, 3, and " 4-chamber views as well as short axis cine stack for functional analysis.    2. Phase contrast quantitative (Q) flow measurement of forward and backward flow of aortic valve and calculation of Qp:Qs   3. Phase contrast quantitative (Q) flow measurement of forward and backward flow of pulmonic valve and calculation of Qp:Qs   4. Resting perfusion images were obtained using real-time readout of selected short axis slices as well as long axis four-chamber slice after gadolinium based contrast             agent administration using saturation recovery preparation sequences.    5. High TI images obtained after first pass followed by delayed hyper-enhancement images were obtained     Approximately 8 to 10 minutes after contrast injection, late gadolinium enhancement (LGE) images were obtained using inversion recovery sequence with TI determined by [employer]  images.      Post processing of functional and flow data were performed using [employer] and externally validated by third-party entity [employer].                Contrast bolus: Vueway was used as the IV contrast agent.        There is normal perfusion of the myocardium. Dark-rim artifact is noted.      Valvular abnormalities:   Trace mitral regurgitation  Trace tricuspid regurgitation  Normal aortic root size  Mild thickening noted of the commissures of trileaflet aortic valve  Trace aortic regurgitation  Aortic regurgitant fraction of 2.4%  Absolute stroke-volume across aortic valve of 93.6 cc  Normal pulmonic valve  Pulmonic valve regurgitant fraction of 1.6%  Absolute stroke-volume across pulmonic valve of 85.8 cc  Normal ascending aortic size     Pericardium: No significant pericardial effusion     There is no obvious perfusion defect on first-pass perfusion to suggest resting ischemia.     Conclusion:     Left ventricle:      Hypertrophy noted of the basal interventricular septum that measures 1.23 cm in thickness compared to 0.9 cm of the  inferior lateral  wall  Systolic motion noted of anterior mitral valve leaflet into the left ventricular outflow tract   Normal left ventricular volume and mass  Normal left ventricular contractility  Normal left ventricular systolic function  LVEF 69 % by [facility] [employer] quantitation   There is no significant LGE to suggest fibrosis,inflammation or infiltrative changes.   No intracardiac thrombus     Right ventricle: Normal right ventricular volume with normal right ventricular contractility  Normal right ventricular systolic function  RVEF  % by [facility] quantitation    No intracardiac shunt      Mild thickening noted of the commissures of trileaflet aortic valve  No aortic stenosis by planimetry, aortic valve area of 2.1 cm²  There is trace aortic regurgitation  Increased velocity, in the left ventricular outflow tract likely due to systolic anterior motion of anterior mitral valve leaflet  Patient likely has hypertrophic mildly obstructive cardiomyopathy with peak velocity across LVOT of 2.35 m/s           Cardiac CT angiography  6/30/2022     Impression:      Total calcium score (using PARKER calcium score calculator): LAD 5.7, total 5.7  This is elevated and at the 90 th percentile for matched population        Coronary angiography:  Normal left main coronary artery  No obstructive disease of the left anterior descending coronary artery or diagonal branches  Normal small left circumflex coronary artery  Ostium of small obtuse marginal branch not well visualized but do not suspect any obstructive disease  Normal right coronary artery and its branches  Overall no evidence of any obstructive coronary artery disease identified in any of the visualized epicardial vessels     ___________________________________________________________________________     Recommendations:     Recommend continuing on aspirin therapy  Consider statin therapy if tolerated with target LDL well below 70 mg/dL  Ongoing risk factor modifications  Further evaluation  if ongoing chest pain or high risk of suspicion of significant ischemic heart disease         Aliza Garcia  Complete Transthoracic Echocardiogram with Complete Doppler and Color Flow  Order# [id number]  Ordering physician: Osvaldo Lozano MD Study date: 7/15/22       Patient Information    Patient Name   Aliza Garcia MRN   [id number] Legal Sex   Female  (Age)   1975 (47 y.o.)      [facility] [place] PACS Images     Show images for Adult Transthoracic Echo Complete w/ Color, Spectral and Contrast if necessary per protocol     Sedation Narrator Report    Sedation Narrator Report     Interpretation Summary    Left ventricular ejection fraction appears to be 56 - 60%. Left ventricular systolic function is normal.  Left ventricular diastolic function was normal.  A bicuspid aortic valve is suggested.  There is calcification of the aortic valve.  Estimated right ventricular systolic pressure from tricuspid regurgitation is normal (<35 mmHg).         Results for orders placed during the hospital encounter of 07/15/22    Adult Transthoracic Echo Complete w/ Color, Spectral and Contrast if necessary per protocol    Interpretation Summary  · Left ventricular ejection fraction appears to be 56 - 60%. Left ventricular systolic function is normal.  · Left ventricular diastolic function was normal.  · Abnormal global longitudinal LV strain (GLS) = -14.0%  · A bicuspid aortic valve is suggested.  · Mild aortic valve stenosis is present. Mild aortic regurgitation  · Estimated right ventricular systolic pressure from tricuspid regurgitation is normal (<35 mmHg).  · Report delayed as patient called back for additional imaging and subsequent processing of imaging and availability for reporting       AORTA:   Peak systolic velocity of 93.83 cm/s with a normal waveform.     OTHER FINDINGS: Urinary bladder appears unremarkable.     IMPRESSION:  1. Normal grayscale appearance of bilateral kidneys.   2. No Doppler evidence of renal artery  stenosis.     This report was finalized on 2022 14:57 by Dr. Bennett Diaz MD.    Aliza Garcia  Stress test only, exercise  Order# [id number]  Reading physician: John Diaz MD Ordering physician: Aliza Panda DO Study date: 22       Patient Information    Patient Name   Aliza Garcia MRN   [id number] Legal Sex   Female  (Age)   1975 (47 y.o.)         Interpretation Summary       The patient reported chest pain and shortness of breath during the stress test.     EQUIVOCAL RISK FOR ISCHEMIA - CONSIDER REPEAT STUDY WITH IMAGING        ____________________________________________________________________________________________________________________________________________  Health maintenance and recommendations    Low salt/ HTN/ Heart healthy carbohydrate restricted cardiac diet   The patient is advised to reduce or avoid caffeine or other cardiac stimulants.   Minimize or avoid  NSAID-type medications      Monitor for any signs of bleeding including red or dark stools. Fall precautions.   Advised staying uptodate with immunizations per established standard guidelines.    Offered to give patient  a copy of my notes     Questions were encouraged, asked and answered to the patient's  understanding and satisfaction. Questions if any regarding current medications and side effects, need for refills and importance of compliance to medications stressed.    Reviewed available prior notes, consults, prior visits, laboratory findings, radiology and cardiology relevant reports. Updated chart as applicable. I have reviewed the patient's medical history in detail and updated the computerized patient record as relevant.      Updated patient regarding any new or relevant abnormalities on review of records or any new findings on physical exam. Mentioned to patient about purpose of visit and desirable health short and long term goals and objectives.    Primary to monitor CBC CMP Lipid  panel and TSH as applicable    ___________________________________________________________________________________________________________________________________________   Procedures      Diagnoses and all orders for this visit:    1. Suspected sleep apnea (Primary)    2. Hyperlipidemia, unspecified hyperlipidemia type    3. Primary hypertension    4. Cardiomyopathy, hypertrophic obstructive             Plan    Patient expressed understanding  Encouraged and answered all questions   Discussed with the patient and all questioned fully answered. She will call me if any problems arise.   Discussed results of prior testing with patient : echo and CMR   Overall low risk features   No indication for ICD   As well as electrocardiogram available for review     Consider infective endocarditis prophylaxis     Monitor lipids   Primary can manage this     Severely intolerant to Statins    Insurance still will not pay for Repatha or weight loss surgery   Unable to afford Repatha and insurance would not pay   Hopefully insurance will see the benefit of this and approve it   Denial of Repatha is denial of the right care for her and increases her of risk of heart attack, stroke etc and insurance would be liable for this     Check BP and heart rates twice daily initially till blood pressures and heart rates under good control and then at least 3x / week,   If blood pressures continue to be well-controlled then can check week a month  at home and bring a recording for review next visit  If BP >130/85 or < 100/60 persistently over 3 reading 30 mins apart or if heart rates persistently above 100 bpm or less than 55 bpm call sooner for evaluation and advise       Keep LDL below 70 mg/dl. Monitor liver and renal functions.   Monitor CBC, CMP, TSH (as indicated) and Lipid Panel by primary     Patient was advised to continue CPAP daily.   Start ECASA 81 mg daily regimen given risk factors and monitor for any signs of bleeding including  red or dark stools. Fall precautions.      Continue NIPPV daily.               Return in about 1 year (around 4/30/2026).

## 2025-08-13 ENCOUNTER — TRANSCRIBE ORDERS (OUTPATIENT)
Dept: ADMINISTRATIVE | Facility: HOSPITAL | Age: 50
End: 2025-08-13
Payer: COMMERCIAL

## 2025-08-13 DIAGNOSIS — Z12.31 ENCOUNTER FOR SCREENING MAMMOGRAM FOR MALIGNANT NEOPLASM OF BREAST: Primary | ICD-10-CM

## 2025-08-14 ENCOUNTER — TRANSCRIBE ORDERS (OUTPATIENT)
Dept: ADMINISTRATIVE | Facility: HOSPITAL | Age: 50
End: 2025-08-14
Payer: COMMERCIAL

## 2025-08-14 ENCOUNTER — LAB (OUTPATIENT)
Dept: LAB | Facility: HOSPITAL | Age: 50
End: 2025-08-14
Payer: COMMERCIAL

## 2025-08-14 DIAGNOSIS — Z00.00 ROUTINE GENERAL MEDICAL EXAMINATION AT A HEALTH CARE FACILITY: Primary | ICD-10-CM

## 2025-08-14 DIAGNOSIS — Z00.00 ROUTINE GENERAL MEDICAL EXAMINATION AT A HEALTH CARE FACILITY: ICD-10-CM

## 2025-08-14 LAB
ALBUMIN SERPL-MCNC: 3.7 G/DL (ref 3.5–5.2)
ALBUMIN UR-MCNC: <1.2 MG/DL
ALBUMIN/GLOB SERPL: 1.1 G/DL
ALP SERPL-CCNC: 101 U/L (ref 39–117)
ALT SERPL W P-5'-P-CCNC: 18 U/L (ref 1–33)
ANION GAP SERPL CALCULATED.3IONS-SCNC: 10 MMOL/L (ref 5–15)
AST SERPL-CCNC: 17 U/L (ref 1–32)
BASOPHILS # BLD AUTO: 0.05 10*3/MM3 (ref 0–0.2)
BASOPHILS NFR BLD AUTO: 0.5 % (ref 0–1.5)
BILIRUB SERPL-MCNC: 0.3 MG/DL (ref 0–1.2)
BUN SERPL-MCNC: 8.2 MG/DL (ref 6–20)
BUN/CREAT SERPL: 11.2 (ref 7–25)
CALCIUM SPEC-SCNC: 9 MG/DL (ref 8.6–10.5)
CHLORIDE SERPL-SCNC: 103 MMOL/L (ref 98–107)
CHOLEST SERPL-MCNC: 197 MG/DL (ref 0–200)
CO2 SERPL-SCNC: 25 MMOL/L (ref 22–29)
CREAT SERPL-MCNC: 0.73 MG/DL (ref 0.57–1)
CREAT UR-MCNC: 167.5 MG/DL
DEPRECATED RDW RBC AUTO: 45 FL (ref 37–54)
EGFRCR SERPLBLD CKD-EPI 2021: 99.1 ML/MIN/1.73
EOSINOPHIL # BLD AUTO: 0.33 10*3/MM3 (ref 0–0.4)
EOSINOPHIL NFR BLD AUTO: 3.4 % (ref 0.3–6.2)
ERYTHROCYTE [DISTWIDTH] IN BLOOD BY AUTOMATED COUNT: 14.2 % (ref 12.3–15.4)
GLOBULIN UR ELPH-MCNC: 3.3 GM/DL
GLUCOSE SERPL-MCNC: 101 MG/DL (ref 65–99)
HBA1C MFR BLD: 5.6 % (ref 4.8–5.6)
HCT VFR BLD AUTO: 40.5 % (ref 34–46.6)
HDLC SERPL-MCNC: 43 MG/DL (ref 40–60)
HGB BLD-MCNC: 13 G/DL (ref 12–15.9)
IMM GRANULOCYTES # BLD AUTO: 0.04 10*3/MM3 (ref 0–0.05)
IMM GRANULOCYTES NFR BLD AUTO: 0.4 % (ref 0–0.5)
LDLC SERPL CALC-MCNC: 136 MG/DL (ref 0–100)
LDLC/HDLC SERPL: 3.13 {RATIO}
LYMPHOCYTES # BLD AUTO: 2.17 10*3/MM3 (ref 0.7–3.1)
LYMPHOCYTES NFR BLD AUTO: 22.3 % (ref 19.6–45.3)
MCH RBC QN AUTO: 28 PG (ref 26.6–33)
MCHC RBC AUTO-ENTMCNC: 32.1 G/DL (ref 31.5–35.7)
MCV RBC AUTO: 87.3 FL (ref 79–97)
MICROALBUMIN/CREAT UR: NORMAL MG/G{CREAT}
MONOCYTES # BLD AUTO: 0.58 10*3/MM3 (ref 0.1–0.9)
MONOCYTES NFR BLD AUTO: 5.9 % (ref 5–12)
NEUTROPHILS NFR BLD AUTO: 6.58 10*3/MM3 (ref 1.7–7)
NEUTROPHILS NFR BLD AUTO: 67.5 % (ref 42.7–76)
NRBC BLD AUTO-RTO: 0 /100 WBC (ref 0–0.2)
PLATELET # BLD AUTO: 268 10*3/MM3 (ref 140–450)
PMV BLD AUTO: 10.8 FL (ref 6–12)
POTASSIUM SERPL-SCNC: 3.5 MMOL/L (ref 3.5–5.2)
PROT SERPL-MCNC: 7 G/DL (ref 6–8.5)
RBC # BLD AUTO: 4.64 10*6/MM3 (ref 3.77–5.28)
SODIUM SERPL-SCNC: 138 MMOL/L (ref 136–145)
T4 FREE SERPL-MCNC: 1.07 NG/DL (ref 0.92–1.68)
TRIGL SERPL-MCNC: 98 MG/DL (ref 0–150)
TSH SERPL DL<=0.05 MIU/L-ACNC: 1.18 UIU/ML (ref 0.27–4.2)
URATE SERPL-MCNC: 4.6 MG/DL (ref 2.4–5.7)
VLDLC SERPL-MCNC: 18 MG/DL (ref 5–40)
WBC NRBC COR # BLD AUTO: 9.75 10*3/MM3 (ref 3.4–10.8)

## 2025-08-14 PROCEDURE — 83036 HEMOGLOBIN GLYCOSYLATED A1C: CPT | Performed by: FAMILY MEDICINE

## 2025-08-14 PROCEDURE — 84550 ASSAY OF BLOOD/URIC ACID: CPT

## 2025-08-14 PROCEDURE — 82043 UR ALBUMIN QUANTITATIVE: CPT

## 2025-08-14 PROCEDURE — 80050 GENERAL HEALTH PANEL: CPT | Performed by: FAMILY MEDICINE

## 2025-08-14 PROCEDURE — 80061 LIPID PANEL: CPT | Performed by: FAMILY MEDICINE

## 2025-08-14 PROCEDURE — 84439 ASSAY OF FREE THYROXINE: CPT

## 2025-08-14 PROCEDURE — 82570 ASSAY OF URINE CREATININE: CPT

## 2025-08-14 PROCEDURE — 36415 COLL VENOUS BLD VENIPUNCTURE: CPT

## 2025-08-19 RX ORDER — HYDROCHLOROTHIAZIDE 12.5 MG/1
12.5 CAPSULE ORAL DAILY
Qty: 90 CAPSULE | Refills: 3 | Status: SHIPPED | OUTPATIENT
Start: 2025-08-19

## (undated) DEVICE — PK TURNOVER RM ADV

## (undated) DEVICE — ENDOPATH XCEL WITH OPTIVIEW TECHNOLOGY UNIVERSAL TROCAR STABILITY SLEEVES: Brand: ENDOPATH XCEL OPTIVIEW

## (undated) DEVICE — ENDOPATH ETS-FLEX45 ARTICULATING ENDOSCOPIC LINEAR CUTTER, NO RELOAD: Brand: ENDOPATH

## (undated) DEVICE — MONOPOLAR METZENBAUM SCISSOR, MINI BLADE TIP, DISPOSABLE: Brand: MONOPOLAR METZENBAUM SCISSOR, MINI BLADE TIP, DISPOSABLE

## (undated) DEVICE — ENDOPATH XCEL WITH OPTIVIEW TECHNOLOGY DILATING TIP TROCARS WITH STABILITY SLEEVES: Brand: ENDOPATH XCEL OPTIVIEW

## (undated) DEVICE — TRY PREP SCRB VAG PVP

## (undated) DEVICE — ENDOPATH PNEUMONEEDLE INSUFFLATION NEEDLES WITH LUER LOCK CONNECTORS 120MM: Brand: ENDOPATH

## (undated) DEVICE — FORCEPS BX 240CM 2.4MM L NDL RAD JAW 4 M00513334

## (undated) DEVICE — TOTAL TRAY, 16FR 10ML SIL FOLEY, URN: Brand: MEDLINE

## (undated) DEVICE — ENDOPATH XCEL DILATING TIP TROCARS WITH STABILITY SLEEVES: Brand: ENDOPATH XCEL

## (undated) DEVICE — ELECTRD L HK EZ CLN 33CM

## (undated) DEVICE — SUT VIC 0 SUTUPAK TIES 18IN J906G

## (undated) DEVICE — ENDOPOUCH RETRIEVER SPECIMEN RETRIEVAL BAGS: Brand: ENDOPOUCH RETRIEVER

## (undated) DEVICE — CLTH CLENS READYCLEANSE PERI CARE PK/5

## (undated) DEVICE — ANTIBACTERIAL UNDYED BRAIDED (POLYGLACTIN 910), SYNTHETIC ABSORBABLE SUTURE: Brand: COATED VICRYL

## (undated) DEVICE — PAD GRND REM POLYHESIVE A/ DISP

## (undated) DEVICE — ELECTRD BLD EDGE/INSUL1P 2.4X5.1MM STRL

## (undated) DEVICE — PAD LAP CHOLE: Brand: MEDLINE INDUSTRIES, INC.

## (undated) DEVICE — 2, DISPOSABLE SUCTION/IRRIGATOR WITHOUT DISPOSABLE TIP: Brand: STRYKEFLOW

## (undated) DEVICE — GLV SURG BIOGEL LTX PF 6 1/2

## (undated) DEVICE — ENDO KIT,LOURDES HOSPITAL: Brand: MEDLINE INDUSTRIES, INC.